# Patient Record
Sex: MALE | Race: WHITE | Employment: OTHER | URBAN - METROPOLITAN AREA
[De-identification: names, ages, dates, MRNs, and addresses within clinical notes are randomized per-mention and may not be internally consistent; named-entity substitution may affect disease eponyms.]

---

## 2020-10-30 ENCOUNTER — APPOINTMENT (OUTPATIENT)
Dept: CT IMAGING | Age: 70
DRG: 871 | End: 2020-10-30
Attending: EMERGENCY MEDICINE
Payer: MEDICARE

## 2020-10-30 ENCOUNTER — APPOINTMENT (OUTPATIENT)
Dept: GENERAL RADIOLOGY | Age: 70
DRG: 871 | End: 2020-10-30
Attending: EMERGENCY MEDICINE
Payer: MEDICARE

## 2020-10-30 ENCOUNTER — HOSPITAL ENCOUNTER (INPATIENT)
Age: 70
LOS: 9 days | Discharge: HOME | DRG: 871 | End: 2020-11-10
Attending: EMERGENCY MEDICINE | Admitting: HOSPITALIST
Payer: MEDICARE

## 2020-10-30 DIAGNOSIS — R50.9 FEVER, UNSPECIFIED FEVER CAUSE: ICD-10-CM

## 2020-10-30 DIAGNOSIS — G93.40 ACUTE ENCEPHALOPATHY: Primary | ICD-10-CM

## 2020-10-30 PROBLEM — R41.0 ACUTE DELIRIUM: Status: ACTIVE | Noted: 2020-10-30

## 2020-10-30 LAB
ALBUMIN SERPL-MCNC: 3.7 G/DL (ref 3.5–5)
ALBUMIN/GLOB SERPL: 0.9 {RATIO} (ref 1.1–2.2)
ALP SERPL-CCNC: 64 U/L (ref 45–117)
ALT SERPL-CCNC: 28 U/L (ref 12–78)
ANION GAP SERPL CALC-SCNC: 10 MMOL/L (ref 5–15)
APPEARANCE CSF: CLEAR
APPEARANCE CSF: CLEAR
ARTERIAL PATENCY WRIST A: YES
AST SERPL-CCNC: 29 U/L (ref 15–37)
BASE EXCESS BLD CALC-SCNC: 2 MMOL/L
BASOPHILS # BLD: 0 K/UL (ref 0–0.1)
BASOPHILS NFR BLD: 0 % (ref 0–1)
BDY SITE: ABNORMAL
BILIRUB SERPL-MCNC: 0.8 MG/DL (ref 0.2–1)
BUN SERPL-MCNC: 21 MG/DL (ref 6–20)
BUN/CREAT SERPL: 16 (ref 12–20)
CALCIUM SERPL-MCNC: 9.3 MG/DL (ref 8.5–10.1)
CHLORIDE SERPL-SCNC: 101 MMOL/L (ref 97–108)
CO2 SERPL-SCNC: 28 MMOL/L (ref 21–32)
COLOR CSF: COLORLESS
COLOR CSF: COLORLESS
CREAT SERPL-MCNC: 1.35 MG/DL (ref 0.7–1.3)
DIFFERENTIAL METHOD BLD: ABNORMAL
EOSINOPHIL # BLD: 0 K/UL (ref 0–0.4)
EOSINOPHIL NFR BLD: 0 % (ref 0–7)
ERYTHROCYTE [DISTWIDTH] IN BLOOD BY AUTOMATED COUNT: 13.5 % (ref 11.5–14.5)
GAS FLOW.O2 O2 DELIVERY SYS: ABNORMAL L/MIN
GLOBULIN SER CALC-MCNC: 4.3 G/DL (ref 2–4)
GLUCOSE BLD STRIP.AUTO-MCNC: 185 MG/DL (ref 65–100)
GLUCOSE CSF-MCNC: 110 MG/DL (ref 40–70)
GLUCOSE SERPL-MCNC: 172 MG/DL (ref 65–100)
HCO3 BLD-SCNC: 26.5 MMOL/L (ref 22–26)
HCT VFR BLD AUTO: 39.7 % (ref 36.6–50.3)
HGB BLD-MCNC: 12.7 G/DL (ref 12.1–17)
IMM GRANULOCYTES # BLD AUTO: 0.1 K/UL (ref 0–0.04)
IMM GRANULOCYTES NFR BLD AUTO: 1 % (ref 0–0.5)
LACTATE SERPL-SCNC: 2.6 MMOL/L (ref 0.4–2)
LACTATE SERPL-SCNC: 2.7 MMOL/L (ref 0.4–2)
LYMPHOCYTES # BLD: 0.8 K/UL (ref 0.8–3.5)
LYMPHOCYTES NFR BLD: 4 % (ref 12–49)
MCH RBC QN AUTO: 28.5 PG (ref 26–34)
MCHC RBC AUTO-ENTMCNC: 32 G/DL (ref 30–36.5)
MCV RBC AUTO: 89 FL (ref 80–99)
MONOCYTES # BLD: 1 K/UL (ref 0–1)
MONOCYTES NFR BLD: 5 % (ref 5–13)
NEUTS SEG # BLD: 17.6 K/UL (ref 1.8–8)
NEUTS SEG NFR BLD: 90 % (ref 32–75)
NRBC # BLD: 0 K/UL (ref 0–0.01)
NRBC BLD-RTO: 0 PER 100 WBC
PCO2 BLD: 41 MMHG (ref 35–45)
PH BLD: 7.42 [PH] (ref 7.35–7.45)
PLATELET # BLD AUTO: 215 K/UL (ref 150–400)
PMV BLD AUTO: 12 FL (ref 8.9–12.9)
PO2 BLD: 65 MMHG (ref 80–100)
POTASSIUM SERPL-SCNC: 3.8 MMOL/L (ref 3.5–5.1)
PROT CSF-MCNC: 82 MG/DL (ref 15–45)
PROT SERPL-MCNC: 8 G/DL (ref 6.4–8.2)
RBC # BLD AUTO: 4.46 M/UL (ref 4.1–5.7)
RBC # CSF: 0 /CU MM
RBC # CSF: 0 /CU MM
SAO2 % BLD: 93 % (ref 92–97)
SERVICE CMNT-IMP: ABNORMAL
SODIUM SERPL-SCNC: 139 MMOL/L (ref 136–145)
SPECIMEN TYPE: ABNORMAL
TOTAL RESP. RATE, ITRR: 13
TROPONIN I SERPL-MCNC: <0.05 NG/ML
TUBE # CSF: 1
TUBE # CSF: 4
WBC # BLD AUTO: 19.5 K/UL (ref 4.1–11.1)
WBC # CSF: 1 /CU MM (ref 0–5)
WBC # CSF: 1 /CU MM (ref 0–5)

## 2020-10-30 PROCEDURE — 96374 THER/PROPH/DIAG INJ IV PUSH: CPT

## 2020-10-30 PROCEDURE — 36600 WITHDRAWAL OF ARTERIAL BLOOD: CPT

## 2020-10-30 PROCEDURE — 96375 TX/PRO/DX INJ NEW DRUG ADDON: CPT

## 2020-10-30 PROCEDURE — 74011000258 HC RX REV CODE- 258: Performed by: EMERGENCY MEDICINE

## 2020-10-30 PROCEDURE — 83605 ASSAY OF LACTIC ACID: CPT

## 2020-10-30 PROCEDURE — 82962 GLUCOSE BLOOD TEST: CPT

## 2020-10-30 PROCEDURE — 82945 GLUCOSE OTHER FLUID: CPT

## 2020-10-30 PROCEDURE — 82803 BLOOD GASES ANY COMBINATION: CPT

## 2020-10-30 PROCEDURE — 36415 COLL VENOUS BLD VENIPUNCTURE: CPT

## 2020-10-30 PROCEDURE — 87040 BLOOD CULTURE FOR BACTERIA: CPT

## 2020-10-30 PROCEDURE — 99218 HC RM OBSERVATION: CPT

## 2020-10-30 PROCEDURE — 87015 SPECIMEN INFECT AGNT CONCNTJ: CPT

## 2020-10-30 PROCEDURE — 71045 X-RAY EXAM CHEST 1 VIEW: CPT

## 2020-10-30 PROCEDURE — 87635 SARS-COV-2 COVID-19 AMP PRB: CPT

## 2020-10-30 PROCEDURE — 93005 ELECTROCARDIOGRAM TRACING: CPT

## 2020-10-30 PROCEDURE — 99285 EMERGENCY DEPT VISIT HI MDM: CPT

## 2020-10-30 PROCEDURE — 89050 BODY FLUID CELL COUNT: CPT

## 2020-10-30 PROCEDURE — 87205 SMEAR GRAM STAIN: CPT

## 2020-10-30 PROCEDURE — 85025 COMPLETE CBC W/AUTO DIFF WBC: CPT

## 2020-10-30 PROCEDURE — 74011250636 HC RX REV CODE- 250/636: Performed by: EMERGENCY MEDICINE

## 2020-10-30 PROCEDURE — 84484 ASSAY OF TROPONIN QUANT: CPT

## 2020-10-30 PROCEDURE — 80053 COMPREHEN METABOLIC PANEL: CPT

## 2020-10-30 PROCEDURE — 74011250637 HC RX REV CODE- 250/637: Performed by: EMERGENCY MEDICINE

## 2020-10-30 PROCEDURE — 70450 CT HEAD/BRAIN W/O DYE: CPT

## 2020-10-30 PROCEDURE — 84157 ASSAY OF PROTEIN OTHER: CPT

## 2020-10-30 RX ORDER — ROSUVASTATIN CALCIUM 10 MG/1
10 TABLET, COATED ORAL
COMMUNITY

## 2020-10-30 RX ORDER — AMLODIPINE AND OLMESARTAN MEDOXOMIL 5; 40 MG/1; MG/1
1 TABLET ORAL DAILY
COMMUNITY

## 2020-10-30 RX ORDER — SITAGLIPTIN AND METFORMIN HYDROCHLORIDE 100; 1000 MG/1; MG/1
TABLET, FILM COATED, EXTENDED RELEASE ORAL
Status: ON HOLD | COMMUNITY
End: 2020-11-03

## 2020-10-30 RX ORDER — ACETAMINOPHEN 325 MG/1
650 TABLET ORAL
Status: COMPLETED | OUTPATIENT
Start: 2020-10-30 | End: 2020-10-30

## 2020-10-30 RX ORDER — LEVOFLOXACIN 5 MG/ML
750 INJECTION, SOLUTION INTRAVENOUS
Status: COMPLETED | OUTPATIENT
Start: 2020-10-30 | End: 2020-10-30

## 2020-10-30 RX ORDER — ACETAMINOPHEN 325 MG/1
650 TABLET ORAL
Status: DISCONTINUED | OUTPATIENT
Start: 2020-10-30 | End: 2020-10-31

## 2020-10-30 RX ORDER — CHLORTHALIDONE 25 MG/1
25 TABLET ORAL DAILY
COMMUNITY

## 2020-10-30 RX ORDER — SODIUM CHLORIDE, SODIUM LACTATE, POTASSIUM CHLORIDE, CALCIUM CHLORIDE 600; 310; 30; 20 MG/100ML; MG/100ML; MG/100ML; MG/100ML
50 INJECTION, SOLUTION INTRAVENOUS CONTINUOUS
Status: DISCONTINUED | OUTPATIENT
Start: 2020-10-30 | End: 2020-11-09

## 2020-10-30 RX ADMIN — LEVOFLOXACIN 750 MG: 5 INJECTION, SOLUTION INTRAVENOUS at 20:09

## 2020-10-30 RX ADMIN — SODIUM CHLORIDE 1000 ML: 900 INJECTION, SOLUTION INTRAVENOUS at 19:45

## 2020-10-30 RX ADMIN — ACETAMINOPHEN 650 MG: 325 TABLET ORAL at 19:18

## 2020-10-30 RX ADMIN — CEFEPIME HYDROCHLORIDE 2 G: 2 INJECTION, POWDER, FOR SOLUTION INTRAVENOUS at 19:18

## 2020-10-30 NOTE — ED PROVIDER NOTES
Mr. Elina Ribera is a 69yo male who presents to the ER after dental procedure with altered mental status. He had a number of teeth removed earlier today. Per the dentist, his procedure was finished at 10:00 this morning. He had been in the office after his sedation and being fitted for dentures. The dentist states that is normal in all day visit. However, he is remained altered since the procedure. He was hypoxic with oxygen saturations of 88%. He has continued to be drowsy and difficult to arouse. His sats had improved on submental oxygen and on room air prior to him being sent to the ER. The patient's son states that he has been feeling well prior to the surgery. He has not had any fevers or chills. Has not had any coughing. He has not had any chest pain or trouble breathing. The history from the patient is limited. Most of the history was obtained by the dentist and also the son. Past Medical History:   Diagnosis Date    Diabetes ([facility] [address]        No past surgical history on file. No family history on file.     Social History     Socioeconomic History    Marital status: Not on file     Spouse name: Not on file    Number of children: Not on file    Years of education: Not on file    Highest education level: Not on file   Occupational History    Not on file   Social Needs    Financial resource strain: Not on file    Food insecurity     Worry: Not on file     Inability: Not on file    Transportation needs     Medical: Not on file     Non-medical: Not on file   Tobacco Use    Smoking status: Never Smoker    Smokeless tobacco: Never Used   Substance and Sexual Activity    Alcohol use: Yes     Comment: social    Drug use: Never    Sexual activity: Not on file   Lifestyle    Physical activity     Days per week: Not on file     Minutes per session: Not on file    Stress: Not on file   Relationships    Social connections     Talks on phone: Not on file     Gets together: Not on file Attends Latter day service: Not on file     Active member of club or organization: Not on file     Attends meetings of clubs or organizations: Not on file     Relationship status: Not on file    Intimate partner violence     Fear of current or ex partner: Not on file     Emotionally abused: Not on file     Physically abused: Not on file     Forced sexual activity: Not on file   Other Topics Concern    Not on file   Social History Narrative    Not on file         ALLERGIES: Patient has no known allergies. Review of Systems   Unable to perform ROS: Mental status change   All other systems reviewed and are negative. There were no vitals filed for this visit. Physical Exam     Vital signs reviewed. Nursing notes reviewed. Const: Appears drowsy, requires to be awoken regularly with verbal stimuli or by touch.   Is only able to answer 1 question before he goes back to sleep  HEENT: Multiple teeth that have been extracted with a mild amount of blood in his mouth  Head:  Atraumatic, normocephalic  Eyes:  PERRL, conjunctiva normal, no scleral icterus  Neck:  Supple, trachea midline  Cardiovascular:  RRR, no murmurs, no gallops, no rubs  Resp:  No resp distress, no increased work of breathing  Abd:  Soft, non-tender, non-distended, no rebound  MSK:  No pedal edema, normal ROM  Neuro: Drowsy and oriented x1 (he states that it [id number] and he is in [place]), no cranial nerve defect  Skin:  Warm, dry, intact  Psych: Drowsy, not agitated        MDM  Number of Diagnoses or Management Options     Amount and/or Complexity of Data Reviewed  Clinical lab tests: ordered and reviewed  Tests in the radiology section of CPT®: ordered and reviewed  Review and summarize past medical records: yes    Patient Progress  Patient progress: stable         Procedures          Perfect Serve Consult for Admission  7:15 PM    ED Room Number: SER06/06  Patient Name and age:  Dallas Anguiano 78 y.o.  male  Working Diagnosis: 1. Acute encephalopathy    2. Fever, unspecified fever cause        COVID-19 Suspicion:  yes  Sepsis present:  no  Reassessment needed: no  Code Status:  Unable to ask due to his mental status  Readmission: no  Isolation Requirements:  yes  Recommended Level of Care:  med/surg  Department:[place] [facility] - [phone]  Other:  LP results pending. abx given. Mr. Morgan Talamantes is a 69yo male who presents to the ER with complaints of continued AMS after dental procedure today. Pt. Is still not back to his baseline. He was found to have a fever in the ER. NO mass or bleed on head CT. Abx given. Pt.  To be admitted by the hospitalist.

## 2020-10-30 NOTE — ED TRIAGE NOTES
Pt comes from the Dentist, pt has been there since 10am and the dentist states that he is still really drowsy and had teeth removed and is oozing blood. Pt states that he was feeling fine before procedure and is currently in no pain.

## 2020-10-31 ENCOUNTER — APPOINTMENT (OUTPATIENT)
Dept: CT IMAGING | Age: 70
DRG: 871 | End: 2020-10-31
Attending: INTERNAL MEDICINE
Payer: MEDICARE

## 2020-10-31 LAB
ALBUMIN SERPL-MCNC: 3 G/DL (ref 3.5–5)
ALBUMIN/GLOB SERPL: 0.8 {RATIO} (ref 1.1–2.2)
ALP SERPL-CCNC: 55 U/L (ref 45–117)
ALT SERPL-CCNC: 19 U/L (ref 12–78)
AMMONIA PLAS-SCNC: 45 UMOL/L
AMPHET UR QL SCN: NEGATIVE
ANION GAP SERPL CALC-SCNC: 6 MMOL/L (ref 5–15)
ANION GAP SERPL CALC-SCNC: 8 MMOL/L (ref 5–15)
APPEARANCE UR: CLEAR
AST SERPL-CCNC: 12 U/L (ref 15–37)
BARBITURATES UR QL SCN: NEGATIVE
BASOPHILS # BLD: 0.1 K/UL (ref 0–0.1)
BASOPHILS NFR BLD: 0 % (ref 0–1)
BENZODIAZ UR QL: POSITIVE
BILIRUB SERPL-MCNC: 0.7 MG/DL (ref 0.2–1)
BILIRUB UR QL: NEGATIVE
BNP SERPL-MCNC: 265 PG/ML
BUN SERPL-MCNC: 13 MG/DL (ref 6–20)
BUN SERPL-MCNC: 21 MG/DL (ref 6–20)
BUN/CREAT SERPL: 12 (ref 12–20)
BUN/CREAT SERPL: 18 (ref 12–20)
CALCIUM SERPL-MCNC: 8.1 MG/DL (ref 8.5–10.1)
CALCIUM SERPL-MCNC: 8.6 MG/DL (ref 8.5–10.1)
CANNABINOIDS UR QL SCN: NEGATIVE
CHLORIDE SERPL-SCNC: 103 MMOL/L (ref 97–108)
CHLORIDE SERPL-SCNC: 106 MMOL/L (ref 97–108)
CO2 SERPL-SCNC: 26 MMOL/L (ref 21–32)
CO2 SERPL-SCNC: 29 MMOL/L (ref 21–32)
COCAINE UR QL SCN: NEGATIVE
COLOR UR: NORMAL
COMMENT, HOLDF: NORMAL
CREAT SERPL-MCNC: 1.08 MG/DL (ref 0.7–1.3)
CREAT SERPL-MCNC: 1.2 MG/DL (ref 0.7–1.3)
DIFFERENTIAL METHOD BLD: ABNORMAL
DRUG SCRN COMMENT,DRGCM: ABNORMAL
EOSINOPHIL # BLD: 0 K/UL (ref 0–0.4)
EOSINOPHIL NFR BLD: 0 % (ref 0–7)
ERYTHROCYTE [DISTWIDTH] IN BLOOD BY AUTOMATED COUNT: 13.7 % (ref 11.5–14.5)
EST. AVERAGE GLUCOSE BLD GHB EST-MCNC: 169 MG/DL
GLOBULIN SER CALC-MCNC: 3.6 G/DL (ref 2–4)
GLUCOSE BLD STRIP.AUTO-MCNC: 141 MG/DL (ref 65–100)
GLUCOSE BLD STRIP.AUTO-MCNC: 173 MG/DL (ref 65–100)
GLUCOSE BLD STRIP.AUTO-MCNC: 173 MG/DL (ref 65–100)
GLUCOSE SERPL-MCNC: 139 MG/DL (ref 65–100)
GLUCOSE SERPL-MCNC: 170 MG/DL (ref 65–100)
GLUCOSE UR STRIP.AUTO-MCNC: NEGATIVE MG/DL
HBA1C MFR BLD: 7.5 % (ref 4–5.6)
HCT VFR BLD AUTO: 34.3 % (ref 36.6–50.3)
HEALTH STATUS, XMCV2T: NORMAL
HGB BLD-MCNC: 11 G/DL (ref 12.1–17)
HGB UR QL STRIP: NEGATIVE
IMM GRANULOCYTES # BLD AUTO: 0.1 K/UL (ref 0–0.04)
IMM GRANULOCYTES NFR BLD AUTO: 1 % (ref 0–0.5)
KETONES UR QL STRIP.AUTO: NEGATIVE MG/DL
LACTATE SERPL-SCNC: 1.5 MMOL/L (ref 0.4–2)
LACTATE SERPL-SCNC: 2.5 MMOL/L (ref 0.4–2)
LEUKOCYTE ESTERASE UR QL STRIP.AUTO: NEGATIVE
LIPASE SERPL-CCNC: 35 U/L (ref 73–393)
LYMPHOCYTES # BLD: 2.3 K/UL (ref 0.8–3.5)
LYMPHOCYTES NFR BLD: 11 % (ref 12–49)
MAGNESIUM SERPL-MCNC: 1.7 MG/DL (ref 1.6–2.4)
MCH RBC QN AUTO: 28.9 PG (ref 26–34)
MCHC RBC AUTO-ENTMCNC: 32.1 G/DL (ref 30–36.5)
MCV RBC AUTO: 90 FL (ref 80–99)
METHADONE UR QL: NEGATIVE
MONOCYTES # BLD: 1 K/UL (ref 0–1)
MONOCYTES NFR BLD: 5 % (ref 5–13)
NEUTS SEG # BLD: 17.2 K/UL (ref 1.8–8)
NEUTS SEG NFR BLD: 83 % (ref 32–75)
NITRITE UR QL STRIP.AUTO: NEGATIVE
NRBC # BLD: 0 K/UL (ref 0–0.01)
NRBC BLD-RTO: 0 PER 100 WBC
OPIATES UR QL: POSITIVE
PCP UR QL: NEGATIVE
PH UR STRIP: 5 [PH] (ref 5–8)
PHOSPHATE SERPL-MCNC: 3.6 MG/DL (ref 2.6–4.7)
PLATELET # BLD AUTO: 197 K/UL (ref 150–400)
PMV BLD AUTO: 12.2 FL (ref 8.9–12.9)
POTASSIUM SERPL-SCNC: 2.7 MMOL/L (ref 3.5–5.1)
POTASSIUM SERPL-SCNC: 3.1 MMOL/L (ref 3.5–5.1)
PROT SERPL-MCNC: 6.6 G/DL (ref 6.4–8.2)
PROT UR STRIP-MCNC: NEGATIVE MG/DL
RBC # BLD AUTO: 3.81 M/UL (ref 4.1–5.7)
SAMPLES BEING HELD,HOLD: NORMAL
SARS-COV-2, COV2: NOT DETECTED
SERVICE CMNT-IMP: ABNORMAL
SODIUM SERPL-SCNC: 138 MMOL/L (ref 136–145)
SODIUM SERPL-SCNC: 140 MMOL/L (ref 136–145)
SOURCE, COVRS: NORMAL
SP GR UR REFRACTOMETRY: 1.03 (ref 1–1.03)
SPECIMEN SOURCE, FCOV2M: NORMAL
SPECIMEN TYPE, XMCV1T: NORMAL
TROPONIN I SERPL-MCNC: <0.05 NG/ML
TSH SERPL DL<=0.05 MIU/L-ACNC: 0.51 UIU/ML (ref 0.36–3.74)
UROBILINOGEN UR QL STRIP.AUTO: 0.2 EU/DL (ref 0.2–1)
WBC # BLD AUTO: 20.7 K/UL (ref 4.1–11.1)

## 2020-10-31 PROCEDURE — 84484 ASSAY OF TROPONIN QUANT: CPT

## 2020-10-31 PROCEDURE — 74011250637 HC RX REV CODE- 250/637: Performed by: INTERNAL MEDICINE

## 2020-10-31 PROCEDURE — 83036 HEMOGLOBIN GLYCOSYLATED A1C: CPT

## 2020-10-31 PROCEDURE — 96376 TX/PRO/DX INJ SAME DRUG ADON: CPT

## 2020-10-31 PROCEDURE — 74011250636 HC RX REV CODE- 250/636: Performed by: HOSPITALIST

## 2020-10-31 PROCEDURE — 74011636637 HC RX REV CODE- 636/637: Performed by: INTERNAL MEDICINE

## 2020-10-31 PROCEDURE — 77030040831 HC BAG URINE DRNG MDII -A

## 2020-10-31 PROCEDURE — 85025 COMPLETE CBC W/AUTO DIFF WBC: CPT

## 2020-10-31 PROCEDURE — 83735 ASSAY OF MAGNESIUM: CPT

## 2020-10-31 PROCEDURE — 84100 ASSAY OF PHOSPHORUS: CPT

## 2020-10-31 PROCEDURE — 74011250636 HC RX REV CODE- 250/636: Performed by: INTERNAL MEDICINE

## 2020-10-31 PROCEDURE — 82140 ASSAY OF AMMONIA: CPT

## 2020-10-31 PROCEDURE — 81003 URINALYSIS AUTO W/O SCOPE: CPT

## 2020-10-31 PROCEDURE — 82962 GLUCOSE BLOOD TEST: CPT

## 2020-10-31 PROCEDURE — 74011250637 HC RX REV CODE- 250/637: Performed by: HOSPITALIST

## 2020-10-31 PROCEDURE — 80307 DRUG TEST PRSMV CHEM ANLYZR: CPT

## 2020-10-31 PROCEDURE — 36415 COLL VENOUS BLD VENIPUNCTURE: CPT

## 2020-10-31 PROCEDURE — 96375 TX/PRO/DX INJ NEW DRUG ADDON: CPT

## 2020-10-31 PROCEDURE — 83605 ASSAY OF LACTIC ACID: CPT

## 2020-10-31 PROCEDURE — 80053 COMPREHEN METABOLIC PANEL: CPT

## 2020-10-31 PROCEDURE — 83690 ASSAY OF LIPASE: CPT

## 2020-10-31 PROCEDURE — 99218 HC RM OBSERVATION: CPT

## 2020-10-31 PROCEDURE — 74011000258 HC RX REV CODE- 258: Performed by: INTERNAL MEDICINE

## 2020-10-31 PROCEDURE — 83880 ASSAY OF NATRIURETIC PEPTIDE: CPT

## 2020-10-31 PROCEDURE — 84443 ASSAY THYROID STIM HORMONE: CPT

## 2020-10-31 RX ORDER — SODIUM CHLORIDE 0.9 % (FLUSH) 0.9 %
5-10 SYRINGE (ML) INJECTION AS NEEDED
Status: DISCONTINUED | OUTPATIENT
Start: 2020-10-31 | End: 2020-11-10 | Stop reason: HOSPADM

## 2020-10-31 RX ORDER — DEXTROSE 50 % IN WATER (D50W) INTRAVENOUS SYRINGE
12.5-25 AS NEEDED
Status: DISCONTINUED | OUTPATIENT
Start: 2020-10-31 | End: 2020-10-31 | Stop reason: CLARIF

## 2020-10-31 RX ORDER — ONDANSETRON 2 MG/ML
4 INJECTION INTRAMUSCULAR; INTRAVENOUS
Status: DISCONTINUED | OUTPATIENT
Start: 2020-10-31 | End: 2020-11-10 | Stop reason: HOSPADM

## 2020-10-31 RX ORDER — VANCOMYCIN 2 GRAM/500 ML IN 0.9 % SODIUM CHLORIDE INTRAVENOUS
2000 ONCE
Status: COMPLETED | OUTPATIENT
Start: 2020-10-31 | End: 2020-10-31

## 2020-10-31 RX ORDER — MAGNESIUM SULFATE 100 %
4 CRYSTALS MISCELLANEOUS AS NEEDED
Status: DISCONTINUED | OUTPATIENT
Start: 2020-10-31 | End: 2020-11-10 | Stop reason: HOSPADM

## 2020-10-31 RX ORDER — MAGNESIUM SULFATE 1 G/100ML
1 INJECTION INTRAVENOUS ONCE
Status: COMPLETED | OUTPATIENT
Start: 2020-10-31 | End: 2020-10-31

## 2020-10-31 RX ORDER — SODIUM CHLORIDE 0.9 % (FLUSH) 0.9 %
5-40 SYRINGE (ML) INJECTION AS NEEDED
Status: DISCONTINUED | OUTPATIENT
Start: 2020-10-31 | End: 2020-11-10 | Stop reason: HOSPADM

## 2020-10-31 RX ORDER — POTASSIUM CHLORIDE 14.9 MG/ML
10 INJECTION INTRAVENOUS
Status: COMPLETED | OUTPATIENT
Start: 2020-10-31 | End: 2020-10-31

## 2020-10-31 RX ORDER — POLYETHYLENE GLYCOL 3350 17 G/17G
17 POWDER, FOR SOLUTION ORAL DAILY PRN
Status: DISCONTINUED | OUTPATIENT
Start: 2020-10-31 | End: 2020-11-10 | Stop reason: HOSPADM

## 2020-10-31 RX ORDER — POTASSIUM CHLORIDE 750 MG/1
40 TABLET, FILM COATED, EXTENDED RELEASE ORAL 2 TIMES DAILY
Status: COMPLETED | OUTPATIENT
Start: 2020-10-31 | End: 2020-11-02

## 2020-10-31 RX ORDER — PROMETHAZINE HYDROCHLORIDE 25 MG/1
12.5 TABLET ORAL
Status: DISCONTINUED | OUTPATIENT
Start: 2020-10-31 | End: 2020-11-10 | Stop reason: HOSPADM

## 2020-10-31 RX ORDER — ROSUVASTATIN CALCIUM 10 MG/1
10 TABLET, COATED ORAL
Status: DISCONTINUED | OUTPATIENT
Start: 2020-10-31 | End: 2020-11-10 | Stop reason: HOSPADM

## 2020-10-31 RX ORDER — VANCOMYCIN/0.9 % SOD CHLORIDE 1.5G/250ML
1500 PLASTIC BAG, INJECTION (ML) INTRAVENOUS
Status: DISCONTINUED | OUTPATIENT
Start: 2020-10-31 | End: 2020-11-02

## 2020-10-31 RX ORDER — SODIUM CHLORIDE 0.9 % (FLUSH) 0.9 %
5-40 SYRINGE (ML) INJECTION EVERY 8 HOURS
Status: DISCONTINUED | OUTPATIENT
Start: 2020-10-31 | End: 2020-11-10 | Stop reason: HOSPADM

## 2020-10-31 RX ORDER — INSULIN LISPRO 100 [IU]/ML
INJECTION, SOLUTION INTRAVENOUS; SUBCUTANEOUS
Status: DISCONTINUED | OUTPATIENT
Start: 2020-10-31 | End: 2020-11-10 | Stop reason: HOSPADM

## 2020-10-31 RX ORDER — ACETAMINOPHEN 650 MG/1
650 SUPPOSITORY RECTAL
Status: DISCONTINUED | OUTPATIENT
Start: 2020-10-31 | End: 2020-11-10 | Stop reason: HOSPADM

## 2020-10-31 RX ORDER — ACETAMINOPHEN 325 MG/1
650 TABLET ORAL
Status: DISCONTINUED | OUTPATIENT
Start: 2020-10-31 | End: 2020-11-10 | Stop reason: HOSPADM

## 2020-10-31 RX ADMIN — MAGNESIUM SULFATE HEPTAHYDRATE 1 G: 1 INJECTION, SOLUTION INTRAVENOUS at 09:11

## 2020-10-31 RX ADMIN — SODIUM CHLORIDE 1000 ML: 900 INJECTION, SOLUTION INTRAVENOUS at 06:50

## 2020-10-31 RX ADMIN — POTASSIUM CHLORIDE 10 MEQ: 14.9 INJECTION, SOLUTION INTRAVENOUS at 06:42

## 2020-10-31 RX ADMIN — SODIUM CHLORIDE, SODIUM LACTATE, POTASSIUM CHLORIDE, AND CALCIUM CHLORIDE 100 ML/HR: 600; 310; 30; 20 INJECTION, SOLUTION INTRAVENOUS at 09:12

## 2020-10-31 RX ADMIN — POTASSIUM CHLORIDE 10 MEQ: 14.9 INJECTION, SOLUTION INTRAVENOUS at 10:29

## 2020-10-31 RX ADMIN — POTASSIUM CHLORIDE 10 MEQ: 14.9 INJECTION, SOLUTION INTRAVENOUS at 09:12

## 2020-10-31 RX ADMIN — VANCOMYCIN HYDROCHLORIDE 1500 MG: 100 INJECTION, POWDER, LYOPHILIZED, FOR SOLUTION INTRAVENOUS at 20:44

## 2020-10-31 RX ADMIN — Medication 10 ML: at 21:03

## 2020-10-31 RX ADMIN — SODIUM CHLORIDE 1000 ML: 900 INJECTION, SOLUTION INTRAVENOUS at 04:38

## 2020-10-31 RX ADMIN — ROSUVASTATIN CALCIUM 10 MG: 10 TABLET, FILM COATED ORAL at 06:04

## 2020-10-31 RX ADMIN — SODIUM CHLORIDE 1000 ML: 900 INJECTION, SOLUTION INTRAVENOUS at 09:12

## 2020-10-31 RX ADMIN — SODIUM CHLORIDE, SODIUM LACTATE, POTASSIUM CHLORIDE, AND CALCIUM CHLORIDE 100 ML/HR: 600; 310; 30; 20 INJECTION, SOLUTION INTRAVENOUS at 00:32

## 2020-10-31 RX ADMIN — PIPERACILLIN AND TAZOBACTAM 3.38 G: 3; .375 INJECTION, POWDER, LYOPHILIZED, FOR SOLUTION INTRAVENOUS at 20:44

## 2020-10-31 RX ADMIN — Medication 10 ML: at 16:02

## 2020-10-31 RX ADMIN — Medication 10 ML: at 06:43

## 2020-10-31 RX ADMIN — PIPERACILLIN AND TAZOBACTAM 3.38 G: 3; .375 INJECTION, POWDER, LYOPHILIZED, FOR SOLUTION INTRAVENOUS at 12:45

## 2020-10-31 RX ADMIN — POTASSIUM CHLORIDE 40 MEQ: 750 TABLET, FILM COATED, EXTENDED RELEASE ORAL at 17:26

## 2020-10-31 RX ADMIN — VANCOMYCIN HYDROCHLORIDE 2000 MG: 100 INJECTION, POWDER, LYOPHILIZED, FOR SOLUTION INTRAVENOUS at 04:39

## 2020-10-31 RX ADMIN — ROSUVASTATIN CALCIUM 10 MG: 10 TABLET, FILM COATED ORAL at 22:54

## 2020-10-31 RX ADMIN — SODIUM CHLORIDE, SODIUM LACTATE, POTASSIUM CHLORIDE, AND CALCIUM CHLORIDE 100 ML/HR: 600; 310; 30; 20 INJECTION, SOLUTION INTRAVENOUS at 16:58

## 2020-10-31 RX ADMIN — INSULIN LISPRO 2 UNITS: 100 INJECTION, SOLUTION INTRAVENOUS; SUBCUTANEOUS at 09:12

## 2020-10-31 RX ADMIN — PIPERACILLIN AND TAZOBACTAM 3.38 G: 3; .375 INJECTION, POWDER, LYOPHILIZED, FOR SOLUTION INTRAVENOUS at 04:50

## 2020-10-31 RX ADMIN — INSULIN LISPRO 2 UNITS: 100 INJECTION, SOLUTION INTRAVENOUS; SUBCUTANEOUS at 11:46

## 2020-10-31 NOTE — ED NOTES
TRANSFER - OUT REPORT:    Verbal report given to Jered Leos RN on Sanju Chaudhari  being transferred to [place] [id number] ([facility]) for routine progression of care       Report consisted of patients Situation, Background, Assessment and   Recommendations(SBAR). Information from the following report(s) ED Summary was reviewed with the receiving nurse. Lines:   Peripheral IV 10/30/20 Left Antecubital (Active)   Site Assessment Clean, dry, & intact 10/30/20 1747   Phlebitis Assessment 0 10/30/20 1747   Infiltration Assessment 0 10/30/20 1747   Dressing Status Clean, dry, & intact 10/30/20 1747   Dressing Type Transparent 10/30/20 1747   Hub Color/Line Status Pink 10/30/20 1747   Action Taken Blood drawn 10/30/20 1747       Peripheral IV 10/30/20 Left Hand (Active)   Site Assessment Clean, dry, & intact 10/30/20 1919   Phlebitis Assessment 0 10/30/20 1919   Infiltration Assessment 0 10/30/20 1919   Dressing Status Clean, dry, & intact 10/30/20 1919   Dressing Type Transparent 10/30/20 1919   Hub Color/Line Status Pink 10/30/20 1919   Action Taken Blood drawn 10/30/20 1919        Opportunity for questions and clarification was provided.       Patient transported with:  [address] [place] [address]

## 2020-10-31 NOTE — PROGRESS NOTES
Pharmacist Note - Vancomycin Dosing    Consult provided for this 78 y.o. male for indication of sepsis  Antibiotic regimen(s): zosyn  Patient on vancomycin PTA? NO     Recent Labs     10/30/20  1725   WBC 19.5*   CREA 1.35*   BUN 21*     Frequency of BMP: daily x 3 then every other day  Height: 188 cm  Weight: 118 kg  Est CrCl: 58 ml/min  Temp (24hrs), Av.5 °F (38.1 °C), Min:99.5 °F (37.5 °C), Max:101.9 °F (38.8 °C)    Cultures:pending    Goal trough = 15 - 20 mcg/mL    Therapy will be initiated with a loading dose of 2gm  IV x 1 to be followed by a maintenance dose of 1500 mg IV every 16 hours. Pharmacy to follow patient daily and order levels / make dose adjustments as appropriate.

## 2020-10-31 NOTE — PROGRESS NOTES
[facility]                                                                                          Hospitalist Progress Note  Yesenia Medina MD  Answering service: [phone] from in house phone        Date of Service:  10/31/2020  NAME:  Meghan Delaney  :  1950  MRN:  [id number]      Admission Summary: This is a 71-year-old man with a past medical history significant for type 2 diabetes, hypertension, dyslipidemia, was in his usual state of health until the day of his presentation at the emergency room when the patient developed a change in mental status. The patient reported to his dentist's office and underwent a number of tooth extractions. The procedure finishing at about 10:00 a.m. The patient developed a change in mental status after the procedure. He was monitored at the dentist's office without any improvement in his mental status. The patient was at baseline before the procedure according to report. The patient was also found to be hypoxic with oxygen saturation of 88%. The oxygen saturation improved with supplemental oxygen. Because of the persistent change in mental status, the patient was sent to the emergency room at the [facility] in [place]. The patient has no history of congestive heart failure or respiratory disease. There was no sick contact or contact with any person with COVID-19 virus infection. When the patient arrived at the emergency room, the patient was found to have a fever of 101.9, significant leukocytosis, and elevated lactic acid level. Lumbar puncture was performed. The patient received antibiotics and was referred to the hospitalist service for evaluation for admission. CT scan of the head done on arrival at the emergency room did not show any acute intracranial abnormalities.   No record of prior admission to this hospital.       Interval history / Subjective:    pt seen and examined AMS resolved   He is asking if he can go home  Pt was fluid resuscitated in ED     Assessment & Plan:     1. Acute delirium. metabolic encephalopathy   - opiates related ? From dental procedure drug screen positive   -   We will obtain MRI of the brain to evaluate the patient for stroke. CT head negative     2. Acute respiratory failure with hypoxia. resolved   - pt seen breathing normally in RA while examined  - CT chest ordered     3. Type 2 diabetes with hyperglycemia. Cont SSI Recheck hemoglobin A1c level. We will hold oral agents until the time of discharge from the hospital.    4.  Sepsis. The diagnosis of sepsis is supported by elevated lactic acid level, fever, and significant leukocytosis. - Patient on vancomycin and Zosyn. LP shows normal CSF  - CT scan of the maxillofacial bone to evaluate the patient for abscess as a possible source of infection. - CT scan of the chest to evaluate the patient for pneumonia as a possible source of sepsis. - CT scan of the abdomen and pelvis to evaluate the patient for possible source of sepsis. 5.  Hypertension. We will hold antihypertensive medication in the setting of sepsis with potential for hypotension. 6.  Dyslipidemia. We will resume preadmission medication. 7. Hypokalemia - replete and repeat lab     Code status: Full  DVT prophylaxis: scd    Care Plan discussed with: Patient/Family and Nurse  Anticipated Disposition: Home w/Family  Anticipated Discharge: 24 hours to 48 hours     Hospital Problems  Date Reviewed: 10/31/2020          Codes Class Noted POA    * (Principal) Acute delirium ICD-10-CM: R41.0  ICD-9-CM: 780.09  10/30/2020 Yes                Review of Systems:   A comprehensive review of systems was negative. Vital Signs:    Last 24hrs VS reviewed since prior progress note.  Most recent are:  Visit Vitals  /71 (BP 1 Location: Left arm, BP Patient Position: At rest)   Pulse 82   Temp 99.3 °F (37.4 °C)   Resp 17 Ht 6' 2\" (1.88 m)   Wt 117.9 kg (259 lb 14.8 oz)   SpO2 99%   BMI 33.37 kg/m²         Intake/Output Summary (Last 24 hours) at 10/31/2020 1033  Last data filed at 10/31/2020 0320  Gross per 24 hour   Intake 280 ml   Output 200 ml   Net 80 ml        Physical Examination:             Constitutional:  No acute distress, cooperative, pleasant    ENT:  Oral mucosa moist, oropharynx benign. Resp:  CTA bilaterally. No wheezing/rhonchi/rales. No accessory muscle use   CV:  Regular rhythm, normal rate, no murmurs, gallops, rubs    GI:  Soft, non distended, non tender. normoactive bowel sounds, no hepatosplenomegaly     Musculoskeletal:  No edema, warm, 2+ pulses throughout    Neurologic:  Moves all extremities. AAOx3, CN II-XII reviewed     Psych:  Good insight, Not anxious nor agitated. Data Review:    I personally reviewed  Image and labs      Labs:     Recent Labs     10/31/20  0316 10/30/20  1725   WBC 20.7* 19.5*   HGB 11.0* 12.7   HCT 34.3* 39.7    215     Recent Labs     10/31/20  0316 10/30/20  1725    139   K 2.7* 3.8    101   CO2 29 28   BUN 21* 21*   CREA 1.20 1.35*   * 172*   CA 8.6 9.3   MG 1.7  --    PHOS 3.6  --      Recent Labs     10/31/20  0316 10/30/20  1725   ALT 19 28   AP 55 64   TBILI 0.7 0.8   TP 6.6 8.0   ALB 3.0* 3.7   GLOB 3.6 4.3*   LPSE 35*  --      No results for input(s): INR, PTP, APTT, INREXT in the last 72 hours. No results for input(s): FE, TIBC, PSAT, FERR in the last 72 hours. No results found for: FOL, RBCF   No results for input(s): PH, PCO2, PO2 in the last 72 hours.   Recent Labs     10/31/20  0316 10/30/20  1725   TROIQ <0.05 <0.05     No results found for: CHOL, CHOLX, CHLST, CHOLV, HDL, HDLP, LDL, LDLC, DLDLP, TGLX, TRIGL, TRIGP, CHHD, CHHDX  Lab Results   Component Value Date/Time    Glucose (POC) 141 (H) 10/31/2020 09:04 AM    Glucose (POC) 185 (H) 10/30/2020 05:10 PM     Lab Results   Component Value Date/Time    Color YELLOW/STRAW 10/31/2020 03:16 AM    Appearance CLEAR 10/31/2020 03:16 AM    Specific gravity 1.030 10/31/2020 03:16 AM    pH (UA) 5.0 10/31/2020 03:16 AM    Protein Negative 10/31/2020 03:16 AM    Glucose Negative 10/31/2020 03:16 AM    Ketone Negative 10/31/2020 03:16 AM    Bilirubin Negative 10/31/2020 03:16 AM    Urobilinogen 0.2 10/31/2020 03:16 AM    Nitrites Negative 10/31/2020 03:16 AM    Leukocyte Esterase Negative 10/31/2020 03:16 AM         Medications Reviewed:     Current Facility-Administered Medications   Medication Dose Route Frequency    sodium chloride (NS) flush 5-10 mL  5-10 mL IntraVENous PRN    sodium chloride (NS) flush 5-40 mL  5-40 mL IntraVENous Q8H    sodium chloride (NS) flush 5-40 mL  5-40 mL IntraVENous PRN    acetaminophen (TYLENOL) tablet 650 mg  650 mg Oral Q6H PRN    Or    acetaminophen (TYLENOL) suppository 650 mg  650 mg Rectal Q6H PRN    polyethylene glycol (MIRALAX) packet 17 g  17 g Oral DAILY PRN    promethazine (PHENERGAN) tablet 12.5 mg  12.5 mg Oral Q6H PRN    Or    ondansetron (ZOFRAN) injection 4 mg  4 mg IntraVENous Q6H PRN    piperacillin-tazobactam (ZOSYN) 3.375 g in 0.9% sodium chloride (MBP/ADV) 100 mL  3.375 g IntraVENous Q8H    insulin lispro (HUMALOG) injection   SubCUTAneous AC&HS    glucose chewable tablet 16 g  4 Tab Oral PRN    glucagon (GLUCAGEN) injection 1 mg  1 mg IntraMUSCular PRN    sodium chloride 0.9 % bolus infusion 537 mL  537 mL IntraVENous ONCE    rosuvastatin (CRESTOR) tablet 10 mg  10 mg Oral QHS    vancomycin dosing per pharmacy   Other Rx Dosing/Monitoring    vancomycin (VANCOCIN) 1500 mg in  ml infusion  1,500 mg IntraVENous Q16H    dextrose 10 % infusion 125-250 mL  125-250 mL IntraVENous PRN    potassium chloride 10 mEq in 50 ml IVPB  10 mEq IntraVENous Q1H    lactated Ringers infusion  100 mL/hr IntraVENous CONTINUOUS     ______________________________________________________________________  EXPECTED LENGTH OF STAY: - - -  ACTUAL LENGTH OF STAY:          Asha Flores MD

## 2020-10-31 NOTE — H&P
[address]  HISTORY AND PHYSICAL    Name:  John Daigle  MR#:  [id number]  :  1950  ACCOUNT #:  [de-identified]  ADMIT DATE:  10/30/2020      Admission order was placed when the patient was still at [facility] at [facility], but the patient did not arrive at [facility] to be physically seen and admitted until 10/31/2020. The patient was seen, evaluated and admitted by me on 10/31/2020. PRIMARY CARE PHYSICIAN:  Unknown. SOURCE OF INFORMATION:  The patient who is not a good historian and review of ED records. CHIEF COMPLAINT:  Change in mental status. HISTORY OF PRESENT ILLNESS:  This is a 73-year-old man with a past medical history significant for type 2 diabetes, hypertension, dyslipidemia, was in his usual state of health until the day of his presentation at the emergency room when the patient developed a change in mental status. The patient reported to his dentist's office and underwent a number of tooth extractions. The procedure finishing at about 10:00 a.m. The patient developed a change in mental status after the procedure. He was monitored at the dentist's office without any improvement in his mental status. The patient was at baseline before the procedure according to report. The patient was also found to be hypoxic with oxygen saturation of 88%. The oxygen saturation improved with supplemental oxygen. Because of the persistent change in mental status, the patient was sent to the emergency room at the [facility] Emergency Room in [place]. The patient has no history of congestive heart failure or respiratory disease. There was no sick contact or contact with any person with COVID-19 virus infection. When the patient arrived at the emergency room, the patient was found to have a fever of 101.9, significant leukocytosis, and elevated lactic acid level. Lumbar puncture was performed.   The patient received antibiotics and was referred to the hospitalist service for evaluation for admission. CT scan of the head done on arrival at the emergency room did not show any acute intracranial abnormalities. No record of prior admission to this hospital.    PAST MEDICAL HISTORY:  Type 2 diabetes, hypertension, dyslipidemia. ALLERGIES:  NO KNOWN DRUG ALLERGIES. MEDICATIONS:  Amlodipine/olmesartan 5/40 one tablet daily, [place] 25 mg daily, Crestor 10 mg daily at bedtime, Janumet /1000 one tablet daily. FAMILY HISTORY:  This was reviewed. His father had hypertension. PAST SURGICAL HISTORY:  Not significant. SOCIAL HISTORY:  No history of tobacco abuse. The patient admits to social consumption of alcohol. REVIEW OF SYSTEMS:  HEAD, EYES, EARS, NOSE AND THROAT:  This is positive for confusion. No headache, no blurring of vision, no photophobia. RESPIRATORY SYSTEM:  This is positive for shortness of breath. No cough, no hemoptysis. CARDIOVASCULAR SYSTEM:  No chest pain, no orthopnea, no palpitation. GASTROINTESTINAL SYSTEM:  No nausea or vomiting. No diarrhea. No constipation. GENITOURINARY SYSTEM:  No dysuria, no urgency, and no frequency. All other systems are reviewed and they are negative. PHYSICAL EXAMINATION:  GENERAL APPEARANCE:  The patient appeared ill, in moderate distress. VITAL SIGNS:  On arrival at the emergency room, temperature 101.9, pulse 97, respiratory rate 13, blood pressure 119/72, oxygen saturation 94%. HEENT:  Head:  Normocephalic, atraumatic. Eyes:  Normal eye movement. No redness, no drainage, no discharge. Ears:  Normal external ears with no evidence of drainage. Nose:  No deformity and no drainage. Mouth and Throat:  No visible oral lesion. Poor oral hygiene with a lot of missing teeth. No active bleeding. NECK:  Neck is supple. No JVD, no thyromegaly. CHEST:  Clear breath sounds. No wheezing, no crackles. HEART:  Normal S1 and S2, regular.   No clinically appreciable murmur. ABDOMEN:  Soft, nontender. Normal bowel sounds. CNS:  Alert, oriented to person and place. No gross focal neurological deficit. EXTREMITIES:  No edema. Pulses 2+ bilaterally. MUSCULOSKELETAL SYSTEM:  No evidence of joint deformity or swelling. SKIN:  No active skin lesions seen in the exposed part of the body. PSYCHIATRY:  Unable to assess mood and affect. LYMPHATIC SYSTEM:  No cervical lymphadenopathy. DIAGNOSTIC DATA:  EKG shows sinus rhythm and nonspecific ST and T-wave abnormalities. CT scan of the head without contrast, no acute intracranial abnormalities. Chest x-ray shows mild central vascular fullness and interstitial prominence. LABORATORY DATA:  Hematology:  WBC 19.5, hemoglobin 12.7, hematocrit 39.7, platelets 644. Cardiac profile:  Troponin less than 0.05. Chemistry:  Sodium 139, potassium 3.8, chloride 101, CO2 28, glucose 172, BUN 21, creatinine 1.35, calcium 9.3, total bilirubin 0.8, ALT 28, AST 29, alkaline phosphatase 64, total protein 8.0, albumin level 3.7, globulin 4.3. ABG done in the emergency room:  PH 7.42, pCO2 41, pO2 65, bicarbonate 26.5, oxygen saturation 93%; this was done on room air. Lactic acid level 2.6. ASSESSMENT:  1. Acute delirium. 2.  Acute respiratory failure with hypoxia. 3.  Type 2 diabetes with hyperglycemia. 4.  Sepsis. 5.  Hypertension. 6.  Dyslipidemia. PLAN:  1. Acute delirium. This is most likely due to metabolic event. We will identify and treat underlying etiological factors which include sepsis. This will be discussed below. It could also be as a result of the anesthetic agent given to the patient prior to dental procedure. We will monitor the patient closely. We will obtain MRI of the brain to evaluate the patient for stroke. The patient may require Neurology consult if there is no improvement in the next 24-48 hours. We will check a TSH level. We will also check urine drug screen.   2.  Acute respiratory failure with hypoxia. We will identify and treat underlying etiological factors. We will obtain CTA of the chest to rule out pulmonary embolism and also to evaluate the patient for pneumonia. We will continue with supplemental oxygen. We will check BNP level to determine if there is a cardiac component to the patient's shortness of breath. We will check serial cardiac markers to rule out acute myocardial infarction as a possible cause of acute respiratory failure with hypoxia. 3.  Type 2 diabetes with hyperglycemia. The patient will be placed on sliding scale with insulin coverage. Recheck hemoglobin A1c level. We will hold oral agents until the time of discharge from the hospital.  4.  Sepsis. The diagnosis of sepsis is supported by elevated lactic acid level, fever, and significant leukocytosis. We will start the patient on vancomycin and Zosyn. We will await the results of lumbar puncture done in the emergency room. We will obtain CT scan of the maxillofacial bone to evaluate the patient for abscess as a possible source of infection. We will await the results of CT scan of the chest to evaluate the patient for pneumonia as a possible source of sepsis. We will also await the results of CT scan of the abdomen and pelvis to evaluate the patient for possible source of sepsis. We will check lipase level. We will check urinalysis. We will await blood culture results. The patient may require Infectious Disease consult if there is no improvement in the next 24-48 hours. 5.  Hypertension. We will hold antihypertensive medication in the setting of sepsis with potential for hypotension. 6.  Dyslipidemia. We will resume preadmission medication. OTHER ISSUES:  Code status: The patient is a full code. We will request SCD for DVT prophylaxis. FUNCTIONAL STATUS PRIOR TO ADMISSION:  The patient came from home. The patient is ambulatory with a cane.     COVID PRECAUTION:  The patient was wearing a face mask. I was wearing a face mask, cap, and gloves for this patient's encounter. The patient will be screened for COVID-19 virus infection. SEPSIS REASSESSMENT COMPLETED:  The patient has improved capillary refill, improved cardiopulmonary examination and moist mucous membrane.         MD CHEIKH [email or web address]  D:  10/31/2020 4:55  T:  10/31/2020 6:26  JOB #:  [id number]

## 2020-10-31 NOTE — PROGRESS NOTES
TRANSFER - IN REPORT:    Verbal report received from [id number] RN(name) on Belen Santillan  being received from [facility](unit) for routine progression of care      Report consisted of patients Situation, Background, Assessment and   Recommendations(SBAR). Information from the following report(s) SBAR, Kardex, ED Summary, Intake/Output, MAR, Recent Results, Med Rec Status and Cardiac Rhythm NSR/ST was reviewed with the receiving nurse. Opportunity for questions and clarification was provided. 0000-Assessment completed upon patients arrival to unit and care assumed. DUAL SKIN ASSESSMENT:  Primary Nurse Lukas Musa RN and Liliane RN performed a dual skin assessment on this patient: No impairment noted. Sulaiman score is 18          Problem: Risk for Spread of Infection  Goal: Prevent transmission of infectious organism to others  Outcome: Progressing Towards GoaL  Pt on droplet plus precautions for COVID rule out           Problem: Falls - Risk of  Goal: Absence of Falls  Outcome: Progressing Towards Goal   Call light in reach, Patient to call for help with toileting needs, Stay With Me (per policy), Toileting schedule/hourly rounds, Urinal in reach              Problem: Pressure Injury - Risk of  Goal: Prevention of pressure injury  Outcome: Progressing Towards Goal  Increase time out of bed, Pressure redistribution bed/mattress(bed type), PT/OT evaluation, pt demonstrated that he can turn himself without any assistance              Problem: Breathing Pattern - Ineffective  Goal: Absence of hypoxia  Outcome: Progressing Towards Goal  Pt on room air, O2 saturation greater than 90%, will continue to monitor          0230-Spoke to pt's son Juan M Coppola, given full update, will continue to update at [phone]        Hourly rounding done, pt in NSR, on room air, O2 saturation greater than 90%, on bedrest, using urinal, urine yellow and clear, no complaints of pain throughout shift. 0800-Bedside shift change report given to Pratima Ocampo (oncoming nurse) by Calderon Brooks  (offgoing nurse). Report included the following information SBAR, Kardex, ED Summary, Intake/Output, MAR, Recent Results, Med Rec Status and Cardiac Rhythm NSR.      Last 3 Recorded Weights in this Encounter    10/30/20 1705 10/31/20 0000   Weight: 122.4 kg (269 lb 13.5 oz) 117.9 kg (259 lb 14.8 oz)

## 2020-10-31 NOTE — PROGRESS NOTES
Problem: Sepsis: Day 2  Goal: *Hemodynamically stable  Outcome: Progressing Towards Goal  VSS Patient Vitals for the past 12 hrs:   Temp Pulse Resp BP SpO2   10/31/20 1603 100.2 °F (37.9 °C) 98 18 (!) 143/66 91 %   10/31/20 1117 99.8 °F (37.7 °C) 97 -- 118/86 94 %   10/31/20 0715 99.3 °F (37.4 °C) 82 17 121/71 99 %       Goal: *Tolerating diet  Outcome: Progressing Towards Goal  Pt tolerating prescribed dental soft diet  Goal: Respiratory  Outcome: Progressing Towards Goal  Pt on RA saturating above 95%   Goal: Treatments/Interventions/Procedures  Outcome: Progressing Towards Goal  Trending lactics - now stable, Daily BMP and CBC ordered for pt     Goal: Medications  Outcome: Progressing Towards Goal  Pt on IVF and ABx - Cefepime, Vancomycin and Zosyn     1930 Bedside shift change report given to Hazel (oncoming nurse) by Tessa Pikcett (offgoing nurse). Report included the following information SBAR, Kardex, Intake/Output, MAR, Accordion, and Recent Results.

## 2020-10-31 NOTE — PROGRESS NOTES
Transition of Care Plan  RUR N/A Observation status    Observation notice provided in writing to patient and/or caregiver as well as verbal explanation of the policy. Patients who are in outpatient status also receive the Observation notice. Signed Medicare and State letters signed and placed in chart. MRI pending  COVID 19  Negative 10/30/20    Reason for Admission:   Acute delirium, Acute respiratory failure with hypoxia, sepsis--receiving IV abx   Had dental work at [facility]-- had AMS after dental work                   RUR Score:     N/A Observation status                 Plan for utilizing home health:      Not indicated at this time    PCP: First and Last name:  Physician in [place]   Name of Practice:    Are you a current patient: Yes/No: yes   Approximate date of last visit:    Can you participate in a virtual visit with your PCP: yes                    Current Advanced Directive/Advance Care Plan: Does not have an AMD-- wife to make decisions                         Transition of Care Plan:      Home to [place] with wife and medical follow up    Cm met with patient in his room to introduce self and explain role. He was alert and oriented. Confirmed demographics, and insurance. He was in [place] having dental work done through [facility]. His son Mayuri Bhatt [phone] drove him to [place]. Patient lives with his wife in their home in [place] [address]. He was self care and independent-driving to daily activities prior to admission Patient is retired from the [employer]. Patient plans to return home when discharged. Son will transport him.              Care Management Interventions  PCP Verified by CM: Yes([place])  Mode of Transport at Discharge: (car)  Transition of Care Consult (CM Consult): Discharge Planning  Discharge Durable Medical Equipment: No  Physical Therapy Consult: No  Occupational Therapy Consult: No  Speech Therapy Consult: No  Current Support Network: Lives with Spouse(lives with wife in [place]. . self care and indepedent.   No AMD  wife make decisions)  Confirm Follow Up Transport: Family(self)  Discharge Location  Discharge Placement: Home

## 2020-11-01 ENCOUNTER — APPOINTMENT (OUTPATIENT)
Dept: CT IMAGING | Age: 70
DRG: 871 | End: 2020-11-01
Attending: INTERNAL MEDICINE
Payer: MEDICARE

## 2020-11-01 PROBLEM — G93.40 ENCEPHALOPATHY: Status: ACTIVE | Noted: 2020-11-01

## 2020-11-01 LAB
ANION GAP SERPL CALC-SCNC: 2 MMOL/L (ref 5–15)
APPEARANCE UR: NORMAL
ATRIAL RATE: 99 BPM
BACTERIA URNS QL MICRO: NORMAL /HPF
BASOPHILS # BLD: 0 K/UL (ref 0–0.1)
BASOPHILS NFR BLD: 0 % (ref 0–1)
BILIRUB UR QL: NORMAL
BUN SERPL-MCNC: 7 MG/DL (ref 6–20)
BUN/CREAT SERPL: 7 (ref 12–20)
CALCIUM SERPL-MCNC: 8.7 MG/DL (ref 8.5–10.1)
CALCULATED P AXIS, ECG09: 63 DEGREES
CALCULATED R AXIS, ECG10: -43 DEGREES
CALCULATED T AXIS, ECG11: 61 DEGREES
CHLORIDE SERPL-SCNC: 103 MMOL/L (ref 97–108)
CO2 SERPL-SCNC: 32 MMOL/L (ref 21–32)
COLOR UR: NORMAL
CREAT SERPL-MCNC: 1.01 MG/DL (ref 0.7–1.3)
DIAGNOSIS, 93000: NORMAL
DIFFERENTIAL METHOD BLD: ABNORMAL
EOSINOPHIL # BLD: 0.1 K/UL (ref 0–0.4)
EOSINOPHIL NFR BLD: 0 % (ref 0–7)
EPITH CASTS URNS QL MICRO: NORMAL /LPF (ref 0–5)
ERYTHROCYTE [DISTWIDTH] IN BLOOD BY AUTOMATED COUNT: 13.3 % (ref 11.5–14.5)
GLUCOSE BLD STRIP.AUTO-MCNC: 138 MG/DL (ref 65–100)
GLUCOSE BLD STRIP.AUTO-MCNC: 139 MG/DL (ref 65–100)
GLUCOSE BLD STRIP.AUTO-MCNC: 143 MG/DL (ref 65–100)
GLUCOSE BLD STRIP.AUTO-MCNC: 155 MG/DL (ref 65–100)
GLUCOSE SERPL-MCNC: 147 MG/DL (ref 65–100)
GLUCOSE UR STRIP.AUTO-MCNC: NORMAL MG/DL
HCT VFR BLD AUTO: 29.1 % (ref 36.6–50.3)
HGB BLD-MCNC: 9.5 G/DL (ref 12.1–17)
HGB UR QL STRIP: NORMAL
IMM GRANULOCYTES # BLD AUTO: 0.1 K/UL (ref 0–0.04)
IMM GRANULOCYTES NFR BLD AUTO: 0 % (ref 0–0.5)
KETONES UR QL STRIP.AUTO: NORMAL MG/DL
LEUKOCYTE ESTERASE UR QL STRIP.AUTO: NORMAL
LYMPHOCYTES # BLD: 1.8 K/UL (ref 0.8–3.5)
LYMPHOCYTES NFR BLD: 14 % (ref 12–49)
MCH RBC QN AUTO: 28.9 PG (ref 26–34)
MCHC RBC AUTO-ENTMCNC: 32.6 G/DL (ref 30–36.5)
MCV RBC AUTO: 88.4 FL (ref 80–99)
MONOCYTES # BLD: 0.9 K/UL (ref 0–1)
MONOCYTES NFR BLD: 7 % (ref 5–13)
NEUTS SEG # BLD: 10.5 K/UL (ref 1.8–8)
NEUTS SEG NFR BLD: 79 % (ref 32–75)
NITRITE UR QL STRIP.AUTO: NORMAL
NRBC # BLD: 0 K/UL (ref 0–0.01)
NRBC BLD-RTO: 0 PER 100 WBC
P-R INTERVAL, ECG05: 188 MS
PH UR STRIP: NORMAL [PH] (ref 5–8)
PLATELET # BLD AUTO: 151 K/UL (ref 150–400)
PMV BLD AUTO: 11.3 FL (ref 8.9–12.9)
POTASSIUM SERPL-SCNC: 3 MMOL/L (ref 3.5–5.1)
PROT UR STRIP-MCNC: NORMAL MG/DL
Q-T INTERVAL, ECG07: 366 MS
QRS DURATION, ECG06: 94 MS
QTC CALCULATION (BEZET), ECG08: 469 MS
RBC # BLD AUTO: 3.29 M/UL (ref 4.1–5.7)
RBC #/AREA URNS HPF: NORMAL /HPF (ref 0–5)
SERVICE CMNT-IMP: ABNORMAL
SODIUM SERPL-SCNC: 137 MMOL/L (ref 136–145)
SP GR UR REFRACTOMETRY: NORMAL (ref 1–1.03)
SP GR UR REFRACTOMETRY: NORMAL (ref 1–1.03)
UROBILINOGEN UR QL STRIP.AUTO: NORMAL EU/DL (ref 0.2–1)
VENTRICULAR RATE, ECG03: 99 BPM
WBC # BLD AUTO: 13.4 K/UL (ref 4.1–11.1)
WBC URNS QL MICRO: NORMAL /HPF (ref 0–4)

## 2020-11-01 PROCEDURE — 74011250637 HC RX REV CODE- 250/637: Performed by: INTERNAL MEDICINE

## 2020-11-01 PROCEDURE — 74011250636 HC RX REV CODE- 250/636: Performed by: HOSPITALIST

## 2020-11-01 PROCEDURE — 99218 HC RM OBSERVATION: CPT

## 2020-11-01 PROCEDURE — 96376 TX/PRO/DX INJ SAME DRUG ADON: CPT

## 2020-11-01 PROCEDURE — 74177 CT ABD & PELVIS W/CONTRAST: CPT

## 2020-11-01 PROCEDURE — 36415 COLL VENOUS BLD VENIPUNCTURE: CPT

## 2020-11-01 PROCEDURE — 82962 GLUCOSE BLOOD TEST: CPT

## 2020-11-01 PROCEDURE — 65660000000 HC RM CCU STEPDOWN

## 2020-11-01 PROCEDURE — 74011636637 HC RX REV CODE- 636/637: Performed by: INTERNAL MEDICINE

## 2020-11-01 PROCEDURE — 85025 COMPLETE CBC W/AUTO DIFF WBC: CPT

## 2020-11-01 PROCEDURE — 74011250636 HC RX REV CODE- 250/636: Performed by: INTERNAL MEDICINE

## 2020-11-01 PROCEDURE — 74011000258 HC RX REV CODE- 258: Performed by: INTERNAL MEDICINE

## 2020-11-01 PROCEDURE — 70486 CT MAXILLOFACIAL W/O DYE: CPT

## 2020-11-01 PROCEDURE — 74011000636 HC RX REV CODE- 636: Performed by: RADIOLOGY

## 2020-11-01 PROCEDURE — 74011250637 HC RX REV CODE- 250/637: Performed by: HOSPITALIST

## 2020-11-01 PROCEDURE — 80048 BASIC METABOLIC PNL TOTAL CA: CPT

## 2020-11-01 PROCEDURE — 71275 CT ANGIOGRAPHY CHEST: CPT

## 2020-11-01 PROCEDURE — 74011000258 HC RX REV CODE- 258: Performed by: RADIOLOGY

## 2020-11-01 RX ORDER — POTASSIUM CHLORIDE 750 MG/1
40 TABLET, FILM COATED, EXTENDED RELEASE ORAL 2 TIMES DAILY
Status: DISCONTINUED | OUTPATIENT
Start: 2020-11-01 | End: 2020-11-01 | Stop reason: ALTCHOICE

## 2020-11-01 RX ORDER — SODIUM CHLORIDE 0.9 % (FLUSH) 0.9 %
10 SYRINGE (ML) INJECTION
Status: COMPLETED | OUTPATIENT
Start: 2020-11-01 | End: 2020-11-01

## 2020-11-01 RX ORDER — POTASSIUM CHLORIDE 7.45 MG/ML
10 INJECTION INTRAVENOUS
Status: COMPLETED | OUTPATIENT
Start: 2020-11-01 | End: 2020-11-01

## 2020-11-01 RX ADMIN — POTASSIUM CHLORIDE 10 MEQ: 10 INJECTION, SOLUTION INTRAVENOUS at 10:13

## 2020-11-01 RX ADMIN — POTASSIUM CHLORIDE 10 MEQ: 10 INJECTION, SOLUTION INTRAVENOUS at 08:42

## 2020-11-01 RX ADMIN — Medication 10 ML: at 21:39

## 2020-11-01 RX ADMIN — IOPAMIDOL 100 ML: 755 INJECTION, SOLUTION INTRAVENOUS at 14:39

## 2020-11-01 RX ADMIN — IOHEXOL 50 ML: 240 INJECTION, SOLUTION INTRATHECAL; INTRAVASCULAR; INTRAVENOUS; ORAL at 14:39

## 2020-11-01 RX ADMIN — VANCOMYCIN HYDROCHLORIDE 1500 MG: 100 INJECTION, POWDER, LYOPHILIZED, FOR SOLUTION INTRAVENOUS at 12:58

## 2020-11-01 RX ADMIN — POTASSIUM CHLORIDE 10 MEQ: 10 INJECTION, SOLUTION INTRAVENOUS at 11:09

## 2020-11-01 RX ADMIN — Medication 10 ML: at 14:39

## 2020-11-01 RX ADMIN — Medication 10 ML: at 06:15

## 2020-11-01 RX ADMIN — POTASSIUM CHLORIDE 40 MEQ: 750 TABLET, FILM COATED, EXTENDED RELEASE ORAL at 17:39

## 2020-11-01 RX ADMIN — Medication 10 ML: at 13:00

## 2020-11-01 RX ADMIN — PIPERACILLIN AND TAZOBACTAM 3.38 G: 3; .375 INJECTION, POWDER, LYOPHILIZED, FOR SOLUTION INTRAVENOUS at 12:42

## 2020-11-01 RX ADMIN — ROSUVASTATIN CALCIUM 10 MG: 10 TABLET, FILM COATED ORAL at 21:39

## 2020-11-01 RX ADMIN — PIPERACILLIN AND TAZOBACTAM 3.38 G: 3; .375 INJECTION, POWDER, LYOPHILIZED, FOR SOLUTION INTRAVENOUS at 06:14

## 2020-11-01 RX ADMIN — ACETAMINOPHEN 650 MG: 325 TABLET ORAL at 20:29

## 2020-11-01 RX ADMIN — SODIUM CHLORIDE, SODIUM LACTATE, POTASSIUM CHLORIDE, AND CALCIUM CHLORIDE 100 ML/HR: 600; 310; 30; 20 INJECTION, SOLUTION INTRAVENOUS at 01:04

## 2020-11-01 RX ADMIN — ACETAMINOPHEN 650 MG: 325 TABLET ORAL at 14:33

## 2020-11-01 RX ADMIN — SODIUM CHLORIDE 100 ML: 900 INJECTION, SOLUTION INTRAVENOUS at 14:39

## 2020-11-01 RX ADMIN — INSULIN LISPRO 2 UNITS: 100 INJECTION, SOLUTION INTRAVENOUS; SUBCUTANEOUS at 12:42

## 2020-11-01 RX ADMIN — POTASSIUM CHLORIDE 40 MEQ: 750 TABLET, FILM COATED, EXTENDED RELEASE ORAL at 08:42

## 2020-11-01 RX ADMIN — PIPERACILLIN AND TAZOBACTAM 3.38 G: 3; .375 INJECTION, POWDER, LYOPHILIZED, FOR SOLUTION INTRAVENOUS at 20:29

## 2020-11-01 NOTE — PROGRESS NOTES
[facility] Adult  Hospitalist Group                                                                                          Hospitalist Progress Note  Angela Harley MD  Answering service: [phone] from in house phone        Date of Service:  2020  NAME:  Nafisa Finley  :  1950  MRN:  [id number]      Admission Summary: This is a 57-year-old man with a past medical history significant for type 2 diabetes, hypertension, dyslipidemia, was in his usual state of health until the day of his presentation at the emergency room when the patient developed a change in mental status. The patient reported to his dentist's office and underwent a number of tooth extractions. The procedure finishing at about 10:00 a.m. The patient developed a change in mental status after the procedure. He was monitored at the dentist's office without any improvement in his mental status. The patient was at baseline before the procedure according to report. The patient was also found to be hypoxic with oxygen saturation of 88%. The oxygen saturation improved with supplemental oxygen. Because of the persistent change in mental status, the patient was sent to the emergency room at the [facility] Emergency Room in [place]. The patient has no history of congestive heart failure or respiratory disease. There was no sick contact or contact with any person with COVID-19 virus infection. When the patient arrived at the emergency room, the patient was found to have a fever of 101.9, significant leukocytosis, and elevated lactic acid level. Lumbar puncture was performed. The patient received antibiotics and was referred to the hospitalist service for evaluation for admission. CT scan of the head done on arrival at the emergency room did not show any acute intracranial abnormalities.   No record of prior admission to this hospital.       Interval history / Subjective:    pt seen and examined AMS resolved his CT scans are still pending his blood pressure is stable oxygen saturation is a stable with nasal cannula patient alert oriented x3 discussed with the patient the need for completing CT scan and look for a possible site of infection     Assessment & Plan:     1. Acute delirium. metabolic encephalopathy resolved  - opiates related ? From dental procedure drug screen positive   -  We will obtain MRI of the brain to evaluate the patient for stroke stable. CT head negative     2. Acute respiratory failure with hypoxia. resolved   - pt seen breathing normally in RA while examined  - CT chest ordered pending    3. Type 2 diabetes with hyperglycemia. Cont SSI Recheck hemoglobin A1c level. We will hold oral agents until the time of discharge from the hospital.    4.  Sepsis. Unclear etiology CT scan ordered on admission is a still pending with RN  -  The diagnosis of sepsis is supported by elevated lactic acid level, fever, and significant leukocytosis. - Patient on vancomycin and Zosyn. LP shows normal CSF  - CT scan of the maxillofacial bone to evaluate the patient for abscess as a possible source of infection. - CT scan of the chest to evaluate the patient for pneumonia as a possible source of sepsis. - CT scan of the abdomen and pelvis to evaluate the patient for possible source of sepsis. 5.  Hypertension. We will hold antihypertensive medication in the setting of sepsis with potential for hypotension. 6.  Dyslipidemia. We will resume preadmission medication.     7. Hypokalemia - replete and repeat lab     Code status: Full  DVT prophylaxis: scd    Care Plan discussed with: Patient/Family and Nurse  Anticipated Disposition: Home w/Family  Anticipated Discharge: 24 hours to 48 hours   Called dental surgeon left      Hospital Problems  Date Reviewed: 10/31/2020          Codes Class Noted POA    * (Principal) Acute delirium ICD-10-CM: R41.0  ICD-9-CM: 780.09  10/30/2020 Yes Review of Systems:   A comprehensive review of systems was negative. Vital Signs:    Last 24hrs VS reviewed since prior progress note. Most recent are:  Visit Vitals  /65 (BP 1 Location: Right arm, BP Patient Position: At rest)   Pulse 77   Temp 98.2 °F (36.8 °C)   Resp 24   Ht 6' 2\" (1.88 m)   Wt 117.1 kg (258 lb 2.5 oz)   SpO2 95%   BMI 33.15 kg/m²         Intake/Output Summary (Last 24 hours) at 11/1/2020 1014  Last data filed at 11/1/2020 0744  Gross per 24 hour   Intake 3266.67 ml   Output 3650 ml   Net -383.33 ml        Physical Examination:             Constitutional:  No acute distress, cooperative, pleasant    ENT:  Oral mucosa moist, oropharynx benign. Resp:  CTA bilaterally. No wheezing/rhonchi/rales. No accessory muscle use   CV:  Regular rhythm, normal rate, no murmurs, gallops, rubs    GI:  Soft, non distended, non tender. normoactive bowel sounds, no hepatosplenomegaly     Musculoskeletal:  No edema, warm, 2+ pulses throughout    Neurologic:  Moves all extremities. AAOx3, CN II-XII reviewed     Psych:  Good insight, Not anxious nor agitated. Data Review:    I personally reviewed  Image and labs      Labs:     Recent Labs     11/01/20  0414 10/31/20  0316   WBC 13.4* 20.7*   HGB 9.5* 11.0*   HCT 29.1* 34.3*    197     Recent Labs     11/01/20  0414 10/31/20  1133 10/31/20  0316    138 140   K 3.0* 3.1* 2.7*    106 103   CO2 32 26 29   BUN 7 13 21*   CREA 1.01 1.08 1.20   * 170* 139*   CA 8.7 8.1* 8.6   MG  --   --  1.7   PHOS  --   --  3.6     Recent Labs     10/31/20  0316 10/30/20  1725   ALT 19 28   AP 55 64   TBILI 0.7 0.8   TP 6.6 8.0   ALB 3.0* 3.7   GLOB 3.6 4.3*   LPSE 35*  --      No results for input(s): INR, PTP, APTT, INREXT, INREXT in the last 72 hours. No results for input(s): FE, TIBC, PSAT, FERR in the last 72 hours. No results found for: FOL, RBCF   No results for input(s): PH, PCO2, PO2 in the last 72 hours.   Recent Labs     10/31/20  0316 10/30/20  1725   TROIQ <0.05 <0.05     No results found for: CHOL, CHOLX, CHLST, CHOLV, HDL, HDLP, LDL, LDLC, DLDLP, TGLX, TRIGL, TRIGP, CHHD, CHHDX  Lab Results   Component Value Date/Time    Glucose (POC) 138 (H) 11/01/2020 08:11 AM    Glucose (POC) 173 (H) 10/31/2020 10:35 PM    Glucose (POC) 173 (H) 10/31/2020 11:42 AM    Glucose (POC) 141 (H) 10/31/2020 09:04 AM    Glucose (POC) 185 (H) 10/30/2020 05:10 PM     Lab Results   Component Value Date/Time    Color YELLOW/STRAW 10/31/2020 03:16 AM    Appearance CLEAR 10/31/2020 03:16 AM    Specific gravity 1.030 10/31/2020 03:16 AM    pH (UA) 5.0 10/31/2020 03:16 AM    Protein Negative 10/31/2020 03:16 AM    Glucose Negative 10/31/2020 03:16 AM    Ketone Negative 10/31/2020 03:16 AM    Bilirubin Negative 10/31/2020 03:16 AM    Urobilinogen 0.2 10/31/2020 03:16 AM    Nitrites Negative 10/31/2020 03:16 AM    Leukocyte Esterase Negative 10/31/2020 03:16 AM         Medications Reviewed:     Current Facility-Administered Medications   Medication Dose Route Frequency    potassium chloride 10 mEq in 100 ml IVPB  10 mEq IntraVENous Q1H    sodium chloride (NS) flush 5-10 mL  5-10 mL IntraVENous PRN    sodium chloride (NS) flush 5-40 mL  5-40 mL IntraVENous Q8H    sodium chloride (NS) flush 5-40 mL  5-40 mL IntraVENous PRN    acetaminophen (TYLENOL) tablet 650 mg  650 mg Oral Q6H PRN    Or    acetaminophen (TYLENOL) suppository 650 mg  650 mg Rectal Q6H PRN    polyethylene glycol (MIRALAX) packet 17 g  17 g Oral DAILY PRN    promethazine (PHENERGAN) tablet 12.5 mg  12.5 mg Oral Q6H PRN    Or    ondansetron (ZOFRAN) injection 4 mg  4 mg IntraVENous Q6H PRN    piperacillin-tazobactam (ZOSYN) 3.375 g in 0.9% sodium chloride (MBP/ADV) 100 mL  3.375 g IntraVENous Q8H    insulin lispro (HUMALOG) injection   SubCUTAneous AC&HS    glucose chewable tablet 16 g  4 Tab Oral PRN    glucagon (GLUCAGEN) injection 1 mg  1 mg IntraMUSCular PRN    rosuvastatin (CRESTOR) tablet 10 mg  10 mg Oral QHS    vancomycin dosing per pharmacy   Other Rx Dosing/Monitoring    vancomycin (VANCOCIN) 1500 mg in  ml infusion  1,500 mg IntraVENous Q16H    dextrose 10 % infusion 125-250 mL  125-250 mL IntraVENous PRN    potassium chloride SR (KLOR-CON 10) tablet 40 mEq  40 mEq Oral BID    lactated Ringers infusion  50 mL/hr IntraVENous CONTINUOUS     ______________________________________________________________________  EXPECTED LENGTH OF STAY: - - -  ACTUAL LENGTH OF STAY:          0                 Alee Baer MD

## 2020-11-01 NOTE — PROGRESS NOTES
Problem: Sepsis: Day 3  Goal: *Oxygen saturation within defined limits  Outcome: Progressing Towards Goal  Pt maintaining O2 sats above 95% on RA  Goal: *Tolerating diet  Outcome: Progressing Towards Goal  Pt tolerating prescribed dental diet  Goal: Activity/Safety  Outcome: Progressing Towards Goal  Pt OOB with max assist. Needs PT/OT  Goal: Medications  Outcome: Progressing Towards Goal  Pt on Vanc, Zosyn and IVF    1930 Bedside shift change report given to [address] [place] [address] (oncoming nurse) by [facility] Heart (offgoing nurse). Report included the following information . SBAR, Kardex, Intake/Output, MAR, Accordion, and Recent Results

## 2020-11-01 NOTE — PROGRESS NOTES
Day #2 of Vancomycin  Indication:  sepsis  Current regimen:  1500mg IV every 16 hours (to start today)  Abx regimen:  vanc + zosyn  ID Following ?: NO  Concomitant nephrotoxic drugs (requires more frequent monitoring): Contrast agents (ordered for  - not yet given)  Frequency of BMP?: daily    Recent Labs     20  0414 10/31/20  1133 10/31/20  0316 10/30/20  1725   WBC 13.4*  --  20.7* 19.5*   CREA 1.01 1.08 1.20 1.35*   BUN 7 13 21* 21*     Est CrCl: 95 ml/min; UO: >1 ml/kg/hr  Temp (24hrs), Av.3 °F (37.4 °C), Min:98.2 °F (36.8 °C), Max:100.2 °F (37.9 °C)    Cultures:   10/30 blood - NGTD, prelim  10/30 CSF - NGTD, prelim    Goal trough = 15 - 20 mcg/mL    Recent trough history (date/time/level/dose/action taken):  N/A    Plan: Continue current regimen. WBCs are down significantly from yesterday.  Trough level ordered for  @ 0400 (prior to the 4th total dose)    Edward Yan, PharmD, BCPP, [facility] Specialist, [address] [place] [address]

## 2020-11-01 NOTE — PROGRESS NOTES
Problem: Falls - Risk of  Goal: Absence of Falls  Outcome: Progressing Towards Goal  Call light in reach, Patient to call for help with toileting needs, Stay With Me (per policy), Toileting schedule/hourly rounds              Problem: Pressure Injury - Risk of  Goal: Prevention of pressure injury  Outcome: Progressing Towards Goal  Absorbent underpads, Contain wound drainage, Internal/External urinary devices, Minimize layers, Lift sheet, Moisture barrier, Check for incontinence Q2 hours and as needed, Increase time out of bed, Pressure redistribution bed/mattress(bed type), PT/OT evaluation, pt demonstrated that he can turn himself without any assistance           Problem: Breathing Pattern - Ineffective  Goal: Absence of hypoxia  Outcome: Progressing Towards Goal  Pt on room air, O2 saturation greater than 90%, will continue to monitor           Problem: Diabetes Self-Management  Goal: Monitoring blood glucose, interpreting and using results  Outcome: Progressing Towards Goal  Pt on ACHS blood glucose checks, treated with sliding scale insulin as indicated, no coverage need tonight           Problem: Sepsis: Day 2  Goal: Medications  Outcome: Progressing Towards Goal  Pt on Zosyn every 8 hours, and Vancomycin every 16 hours        Hourly rounding done, pt in NSR, on room air, O2 saturation greater than 90%, ambulated to bathroom, had small bowel movement, no complaints of pain throughout shift. 0800-Bedside shift change report given to Pratima Ocampo (oncoming nurse) by Jacek Parker RN (offgoing nurse). Report included the following information SBAR, Kardex, ED Summary, Intake/Output, MAR, Recent Results, Med Rec Status and Cardiac Rhythm NSR.      Last 3 Recorded Weights in this Encounter    10/30/20 1705 10/31/20 0000 11/01/20 0400   Weight: 122.4 kg (269 lb 13.5 oz) 117.9 kg (259 lb 14.8 oz) 117.1 kg (258 lb 2.5 oz)

## 2020-11-02 LAB
ANION GAP SERPL CALC-SCNC: 13 MMOL/L (ref 5–15)
BUN SERPL-MCNC: 8 MG/DL (ref 6–20)
BUN/CREAT SERPL: 9 (ref 12–20)
CALCIUM SERPL-MCNC: 8.4 MG/DL (ref 8.5–10.1)
CHLORIDE SERPL-SCNC: 103 MMOL/L (ref 97–108)
CO2 SERPL-SCNC: 22 MMOL/L (ref 21–32)
COMMENT, HOLDF: NORMAL
COMMENT, HOLDF: NORMAL
CREAT SERPL-MCNC: 0.93 MG/DL (ref 0.7–1.3)
DATE LAST DOSE: NORMAL
ERYTHROCYTE [DISTWIDTH] IN BLOOD BY AUTOMATED COUNT: 12.9 % (ref 11.5–14.5)
GLUCOSE BLD STRIP.AUTO-MCNC: 141 MG/DL (ref 65–100)
GLUCOSE BLD STRIP.AUTO-MCNC: 151 MG/DL (ref 65–100)
GLUCOSE BLD STRIP.AUTO-MCNC: 168 MG/DL (ref 65–100)
GLUCOSE BLD STRIP.AUTO-MCNC: 238 MG/DL (ref 65–100)
GLUCOSE SERPL-MCNC: 103 MG/DL (ref 65–100)
HCT VFR BLD AUTO: 32.2 % (ref 36.6–50.3)
HGB BLD-MCNC: 10.5 G/DL (ref 12.1–17)
MCH RBC QN AUTO: 28.7 PG (ref 26–34)
MCHC RBC AUTO-ENTMCNC: 32.6 G/DL (ref 30–36.5)
MCV RBC AUTO: 88 FL (ref 80–99)
NRBC # BLD: 0 K/UL (ref 0–0.01)
NRBC BLD-RTO: 0 PER 100 WBC
PLATELET # BLD AUTO: 190 K/UL (ref 150–400)
PMV BLD AUTO: 11.7 FL (ref 8.9–12.9)
POTASSIUM SERPL-SCNC: 3.9 MMOL/L (ref 3.5–5.1)
RBC # BLD AUTO: 3.66 M/UL (ref 4.1–5.7)
REPORTED DOSE,DOSE: NORMAL UNITS
REPORTED DOSE/TIME,TMG: NORMAL
SAMPLES BEING HELD,HOLD: NORMAL
SAMPLES BEING HELD,HOLD: NORMAL
SERVICE CMNT-IMP: ABNORMAL
SODIUM SERPL-SCNC: 138 MMOL/L (ref 136–145)
VANCOMYCIN TROUGH SERPL-MCNC: 6.8 UG/ML (ref 5–10)
WBC # BLD AUTO: 12.3 K/UL (ref 4.1–11.1)

## 2020-11-02 PROCEDURE — 80202 ASSAY OF VANCOMYCIN: CPT

## 2020-11-02 PROCEDURE — 82962 GLUCOSE BLOOD TEST: CPT

## 2020-11-02 PROCEDURE — 74011636637 HC RX REV CODE- 636/637: Performed by: INTERNAL MEDICINE

## 2020-11-02 PROCEDURE — 74011250636 HC RX REV CODE- 250/636: Performed by: HOSPITALIST

## 2020-11-02 PROCEDURE — 74011250636 HC RX REV CODE- 250/636: Performed by: INTERNAL MEDICINE

## 2020-11-02 PROCEDURE — 74011250637 HC RX REV CODE- 250/637: Performed by: INTERNAL MEDICINE

## 2020-11-02 PROCEDURE — 36415 COLL VENOUS BLD VENIPUNCTURE: CPT

## 2020-11-02 PROCEDURE — 65660000000 HC RM CCU STEPDOWN

## 2020-11-02 PROCEDURE — 74011000258 HC RX REV CODE- 258: Performed by: INTERNAL MEDICINE

## 2020-11-02 PROCEDURE — 74011250637 HC RX REV CODE- 250/637: Performed by: HOSPITALIST

## 2020-11-02 PROCEDURE — 87040 BLOOD CULTURE FOR BACTERIA: CPT

## 2020-11-02 PROCEDURE — 80048 BASIC METABOLIC PNL TOTAL CA: CPT

## 2020-11-02 PROCEDURE — 85027 COMPLETE CBC AUTOMATED: CPT

## 2020-11-02 RX ORDER — VANCOMYCIN/0.9 % SOD CHLORIDE 1.5G/250ML
1500 PLASTIC BAG, INJECTION (ML) INTRAVENOUS EVERY 12 HOURS
Status: DISCONTINUED | OUTPATIENT
Start: 2020-11-02 | End: 2020-11-02

## 2020-11-02 RX ORDER — VANCOMYCIN 1.75 GRAM/500 ML IN 0.9 % SODIUM CHLORIDE INTRAVENOUS
1750 EVERY 12 HOURS
Status: DISCONTINUED | OUTPATIENT
Start: 2020-11-02 | End: 2020-11-03

## 2020-11-02 RX ADMIN — ACETAMINOPHEN 650 MG: 325 TABLET ORAL at 15:42

## 2020-11-02 RX ADMIN — VANCOMYCIN HYDROCHLORIDE 1500 MG: 100 INJECTION, POWDER, LYOPHILIZED, FOR SOLUTION INTRAVENOUS at 04:54

## 2020-11-02 RX ADMIN — PIPERACILLIN AND TAZOBACTAM 3.38 G: 3; .375 INJECTION, POWDER, LYOPHILIZED, FOR SOLUTION INTRAVENOUS at 06:19

## 2020-11-02 RX ADMIN — INSULIN LISPRO 2 UNITS: 100 INJECTION, SOLUTION INTRAVENOUS; SUBCUTANEOUS at 12:40

## 2020-11-02 RX ADMIN — INSULIN LISPRO 2 UNITS: 100 INJECTION, SOLUTION INTRAVENOUS; SUBCUTANEOUS at 21:51

## 2020-11-02 RX ADMIN — Medication 10 ML: at 21:51

## 2020-11-02 RX ADMIN — SODIUM CHLORIDE, SODIUM LACTATE, POTASSIUM CHLORIDE, AND CALCIUM CHLORIDE 50 ML/HR: 600; 310; 30; 20 INJECTION, SOLUTION INTRAVENOUS at 12:43

## 2020-11-02 RX ADMIN — INSULIN LISPRO 2 UNITS: 100 INJECTION, SOLUTION INTRAVENOUS; SUBCUTANEOUS at 17:28

## 2020-11-02 RX ADMIN — PIPERACILLIN AND TAZOBACTAM 3.38 G: 3; .375 INJECTION, POWDER, LYOPHILIZED, FOR SOLUTION INTRAVENOUS at 23:52

## 2020-11-02 RX ADMIN — Medication 10 ML: at 14:06

## 2020-11-02 RX ADMIN — Medication 10 ML: at 06:29

## 2020-11-02 RX ADMIN — ROSUVASTATIN CALCIUM 10 MG: 10 TABLET, FILM COATED ORAL at 21:51

## 2020-11-02 RX ADMIN — VANCOMYCIN HYDROCHLORIDE 1750 MG: 10 INJECTION, POWDER, LYOPHILIZED, FOR SOLUTION INTRAVENOUS at 15:37

## 2020-11-02 RX ADMIN — INSULIN LISPRO 2 UNITS: 100 INJECTION, SOLUTION INTRAVENOUS; SUBCUTANEOUS at 09:47

## 2020-11-02 RX ADMIN — POTASSIUM CHLORIDE 40 MEQ: 750 TABLET, FILM COATED, EXTENDED RELEASE ORAL at 09:47

## 2020-11-02 RX ADMIN — PIPERACILLIN AND TAZOBACTAM 3.38 G: 3; .375 INJECTION, POWDER, LYOPHILIZED, FOR SOLUTION INTRAVENOUS at 14:06

## 2020-11-02 NOTE — PROGRESS NOTES
Bedside and Verbal shift change report given to Miguel Seaman RN (oncoming nurse) by Henri Anderson RN (offgoing nurse). Report included the following information SBAR, Kardex, ED Summary, Intake/Output, MAR, Accordion, Recent Results, Med Rec Status and Cardiac Rhythm NSR. Patient Vitals for the past 12 hrs:   Temp Pulse Resp BP SpO2   11/02/20 0723 98.3 °F (36.8 °C) 78 28 (!) 150/78 93 %   11/02/20 0407 98.5 °F (36.9 °C) 71 21 (!) 149/75 93 %   11/02/20 0109 98.4 °F (36.9 °C) 65 22 137/61 95 %       Last 3 Recorded Weights in this Encounter    10/31/20 0000 11/01/20 0400 11/02/20 0619   Weight: 117.9 kg (259 lb 14.8 oz) 117.1 kg (258 lb 2.5 oz) 121.1 kg (266 lb 15.6 oz)       Weight variance noted. Dayshift RN notified. Problem: Falls - Risk of  Goal: *Absence of Falls  Description: Document Lynda Posadas Fall Risk and appropriate interventions in the flowsheet.   Outcome: Progressing Towards Goal  Note: Fall Risk Interventions:  Mobility Interventions: Assess mobility with egress test, OT consult for ADLs, Patient to call before getting OOB, PT Consult for mobility concerns, PT Consult for assist device competence, Utilize walker, cane, or other assistive device    Mentation Interventions: Adequate sleep, hydration, pain control, Bed/chair exit alarm, Door open when patient unattended, Evaluate medications/consider consulting pharmacy, Increase mobility, More frequent rounding, Room close to nurse's station, Reorient patient, Toileting rounds, Update white board    Medication Interventions: Assess postural VS orthostatic hypotension, Evaluate medications/consider consulting pharmacy, Bed/chair exit alarm, Patient to call before getting OOB, Teach patient to arise slowly    Elimination Interventions: Bed/chair exit alarm, Call light in reach, Toilet paper/wipes in reach, Toileting schedule/hourly rounds              Problem: Pressure Injury - Risk of  Goal: *Prevention of pressure injury  Description: Document Sulaiman Scale and appropriate interventions in the flowsheet. Outcome: Progressing Towards Goal  Note: Pressure Injury Interventions:       Moisture Interventions: Internal/External urinary devices, Absorbent underpads, Minimize layers, Moisture barrier, Limit adult briefs, Check for incontinence Q2 hours and as needed, Assess need for specialty bed, Apply protective barrier, creams and emollients    Activity Interventions: Assess need for specialty bed, Increase time out of bed, Pressure redistribution bed/mattress(bed type), PT/OT evaluation    Mobility Interventions: Assess need for specialty bed, Pressure redistribution bed/mattress (bed type), PT/OT evaluation    Nutrition Interventions: Document food/fluid/supplement intake, Offer support with meals,snacks and hydration    Friction and Shear Interventions: Apply protective barrier, creams and emollients, HOB 30 degrees or less, Minimize layers                Problem: Breathing Pattern - Ineffective  Goal: *Absence of hypoxia  Outcome: Progressing Towards Goal  Note: Pt on RA. O2 sats maintained in 90s. No signs of respiratory distress. Problem: Sepsis: Day 3  Goal: Off Pathway (Use only if patient is Off Pathway)  Outcome: Progressing Towards Goal  Note: Pt receiving vancomycin and Zosyn. Pt temperature reached 101.4. Tylenol given and temperature was reduced to 98.4. Expected discharge today.

## 2020-11-02 NOTE — PROGRESS NOTES
Transition Plan of Care  RUR 10%-Low  Covid-negative  Patient is independent at baseline. He lives with his wife in [place]. Once medically stable for discharge, son will provide transport.   Lili Nevarez RN CRM  Ext [phone]

## 2020-11-02 NOTE — PROGRESS NOTES
Pharmacist Note - Vancomycin Dosing  Therapy day 3  Indication: sepsis of unclear etiology   Current regimen: 1500 mg IV Q16H    A Trough Level resulted at 6.8 mcg/mL which was obtained 15.5 hrs post-dose. Goal trough: 15 - 20 mcg/mL     Plan: Change to 1750 mg IV Q12H . Pharmacy will continue to monitor this patient daily for changes in clinical status and renal function.

## 2020-11-02 NOTE — PROGRESS NOTES
[facility] Adult  Hospitalist Group                                                                                          Hospitalist Progress Note  Claudia Martinez MD  Answering service: [phone] from in house phone        Date of Service:  2020  NAME:  Dallas Anguiano  :  1950  MRN:  [id number]      Admission Summary: This is a 71-year-old man with a past medical history significant for type 2 diabetes, hypertension, dyslipidemia, was in his usual state of health until the day of his presentation at the emergency room when the patient developed a change in mental status. The patient reported to his dentist's office and underwent a number of tooth extractions. The procedure finishing at about 10:00 a.m. The patient developed a change in mental status after the procedure. He was monitored at the dentist's office without any improvement in his mental status. The patient was at baseline before the procedure according to report. The patient was also found to be hypoxic with oxygen saturation of 88%. The oxygen saturation improved with supplemental oxygen. Because of the persistent change in mental status, the patient was sent to the emergency room at the [facility] in [place]. The patient has no history of congestive heart failure or respiratory disease. There was no sick contact or contact with any person with COVID-19 virus infection. When the patient arrived at the emergency room, the patient was found to have a fever of 101.9, significant leukocytosis, and elevated lactic acid level. Lumbar puncture was performed. The patient received antibiotics and was referred to the hospitalist service for evaluation for admission. CT scan of the head done on arrival at the emergency room did not show any acute intracranial abnormalities.   No record of prior admission to this hospital.       Interval history / Subjective:    pt seen and examined AMS resolved his CT scans are reviewed with him stable oxygen saturation is a stable with nasal cannula patient alert oriented x3 hehad fever overnight but wants to leave the hospital ASAP      Assessment & Plan:     1. Acute delirium. metabolic encephalopathy resolved  - opiates related ? From dental procedure drug screen positive   - CT head negative     2. Acute respiratory failure with hypoxia. resolved   - pt seen breathing normally in RA while examined  - CT chest reviewed     3. Type 2 diabetes with hyperglycemia. Cont SSI Recheck hemoglobin A1c level. We will hold oral agents until the time of discharge from the hospital.    4.  Sepsis. Unclear etiology may be mandibular abscess seen in CT maxillofacial   -  The diagnosis of sepsis is supported by elevated lactic acid level, fever, and significant leukocytosis. - Patient on vancomycin and Zosyn. LP shows normal CSF  - All CT reviewed      5. Hypertension. We will hold antihypertensive medication in the setting of sepsis with potential for hypotension. 6.  Dyslipidemia. We will resume preadmission medication. 7. Hypokalemia - replete and repeat lab     Code status: Full  DVT prophylaxis: scd    Care Plan discussed with: Patient/Family and Nurse  Anticipated Disposition: Home w/Family  Anticipated Discharge: 24 hours to 48 hours   Called dental surgeon left [facility] Problems  Date Reviewed: 10/31/2020          Codes Class Noted POA    Encephalopathy ICD-10-CM: G93.40  ICD-9-CM: 348.30  11/1/2020 Unknown        * (Principal) Acute delirium ICD-10-CM: R41.0  ICD-9-CM: 780.09  10/30/2020 Yes                Review of Systems:   A comprehensive review of systems was negative. Xr Chest Sngl V    Result Date: 10/30/2020  IMPRESSION: Mild central vascular fullness and interstitial prominence. Poor inspiratory effort. No focal infiltrate. .  . Ct Head Wo Cont    Result Date: 10/30/2020  IMPRESSION: 1.  No acute intracranial abnormality. 2. Microvascular ischemic, possible old ischemic and age-related change greater than expected for stated age. 3. Mild sinusitis. Ct Maxillofacial Wo Cont    Result Date: 11/1/2020  IMPRESSION: No fracture. Possible small right mandibular periapical dental abscesses. Cta Chest W Or W Wo Cont    Result Date: 11/1/2020  IMPRESSION: 1. No pulmonary emboli. 2. Groundglass airspace disease, nonspecific. Ct Abd Pelv W Cont    Result Date: 11/1/2020  IMPRESSION: No Acute Disease. Vital Signs:    Last 24hrs VS reviewed since prior progress note. Most recent are:  Visit Vitals  BP (!) 150/78 (BP 1 Location: Right arm, BP Patient Position: At rest)   Pulse 78   Temp 98.3 °F (36.8 °C)   Resp 28   Ht 6' 2\" (1.88 m)   Wt 121.1 kg (266 lb 15.6 oz)   SpO2 93%   BMI 34.28 kg/m²         Intake/Output Summary (Last 24 hours) at 11/2/2020 0781  Last data filed at 11/1/2020 2141  Gross per 24 hour   Intake 1877.5 ml   Output 2150 ml   Net -272.5 ml        Physical Examination:             Constitutional:  No acute distress, cooperative, pleasant    ENT:  Oral mucosa moist, oropharynx benign. Resp:  CTA bilaterally. No wheezing/rhonchi/rales. No accessory muscle use   CV:  Regular rhythm, normal rate, no murmurs, gallops, rubs    GI:  Soft, non distended, non tender. normoactive bowel sounds, no hepatosplenomegaly     Musculoskeletal:  No edema, warm, 2+ pulses throughout    Neurologic:  Moves all extremities. AAOx3, CN II-XII reviewed     Psych:  Good insight, Not anxious nor agitated.        Data Review:    I personally reviewed  Image and labs      Labs:     Recent Labs     11/01/20  0414 10/31/20  0316   WBC 13.4* 20.7*   HGB 9.5* 11.0*   HCT 29.1* 34.3*    197     Recent Labs     11/01/20 0414 10/31/20  1133 10/31/20  0316    138 140   K 3.0* 3.1* 2.7*    106 103   CO2 32 26 29   BUN 7 13 21*   CREA 1.01 1.08 1.20   * 170* 139*   CA 8.7 8.1* 8.6   MG  --   --  1.7   PHOS  -- --  3.6     Recent Labs     10/31/20  0316 10/30/20  1725   ALT 19 28   AP 55 64   TBILI 0.7 0.8   TP 6.6 8.0   ALB 3.0* 3.7   GLOB 3.6 4.3*   LPSE 35*  --      No results for input(s): INR, PTP, APTT, INREXT, INREXT in the last 72 hours. No results for input(s): FE, TIBC, PSAT, FERR in the last 72 hours. No results found for: FOL, RBCF   No results for input(s): PH, PCO2, PO2 in the last 72 hours.   Recent Labs     10/31/20  0316 10/30/20  1725   TROIQ <0.05 <0.05     No results found for: CHOL, CHOLX, CHLST, CHOLV, HDL, HDLP, LDL, LDLC, DLDLP, TGLX, TRIGL, TRIGP, CHHD, CHHDX  Lab Results   Component Value Date/Time    Glucose (POC) 151 (H) 11/02/2020 08:19 AM    Glucose (POC) 143 (H) 11/01/2020 08:42 PM    Glucose (POC) 139 (H) 11/01/2020 05:06 PM    Glucose (POC) 155 (H) 11/01/2020 12:13 PM    Glucose (POC) 138 (H) 11/01/2020 08:11 AM     Lab Results   Component Value Date/Time    Color YELLOW/STRAW 10/31/2020 03:16 AM    Appearance CLEAR 10/31/2020 03:16 AM    Specific gravity 1.030 10/31/2020 03:16 AM    Specific gravity PATIENT DISCHARGED BEFORE SAMPLE COLLECTED 10/30/2020 04:15 PM    pH (UA) 5.0 10/31/2020 03:16 AM    Protein Negative 10/31/2020 03:16 AM    Glucose Negative 10/31/2020 03:16 AM    Ketone Negative 10/31/2020 03:16 AM    Bilirubin Negative 10/31/2020 03:16 AM    Urobilinogen 0.2 10/31/2020 03:16 AM    Nitrites Negative 10/31/2020 03:16 AM    Leukocyte Esterase Negative 10/31/2020 03:16 AM    Epithelial cells PATIENT DISCHARGED BEFORE SAMPLE COLLECTED 10/30/2020 04:15 PM    Bacteria PATIENT DISCHARGED BEFORE SAMPLE COLLECTED 10/30/2020 04:15 PM    WBC PATIENT DISCHARGED BEFORE SAMPLE COLLECTED 10/30/2020 04:15 PM    RBC PATIENT DISCHARGED BEFORE SAMPLE COLLECTED 10/30/2020 04:15 PM         Medications Reviewed:     Current Facility-Administered Medications   Medication Dose Route Frequency    sodium chloride (NS) flush 5-10 mL  5-10 mL IntraVENous PRN    sodium chloride (NS) flush 5-40 mL 5-40 mL IntraVENous Q8H    sodium chloride (NS) flush 5-40 mL  5-40 mL IntraVENous PRN    acetaminophen (TYLENOL) tablet 650 mg  650 mg Oral Q6H PRN    Or    acetaminophen (TYLENOL) suppository 650 mg  650 mg Rectal Q6H PRN    polyethylene glycol (MIRALAX) packet 17 g  17 g Oral DAILY PRN    promethazine (PHENERGAN) tablet 12.5 mg  12.5 mg Oral Q6H PRN    Or    ondansetron (ZOFRAN) injection 4 mg  4 mg IntraVENous Q6H PRN    piperacillin-tazobactam (ZOSYN) 3.375 g in 0.9% sodium chloride (MBP/ADV) 100 mL  3.375 g IntraVENous Q8H    insulin lispro (HUMALOG) injection   SubCUTAneous AC&HS    glucose chewable tablet 16 g  4 Tab Oral PRN    glucagon (GLUCAGEN) injection 1 mg  1 mg IntraMUSCular PRN    rosuvastatin (CRESTOR) tablet 10 mg  10 mg Oral QHS    vancomycin dosing per pharmacy   Other Rx Dosing/Monitoring    vancomycin (VANCOCIN) 1500 mg in  ml infusion  1,500 mg IntraVENous Q16H    dextrose 10 % infusion 125-250 mL  125-250 mL IntraVENous PRN    potassium chloride SR (KLOR-CON 10) tablet 40 mEq  40 mEq Oral BID    lactated Ringers infusion  50 mL/hr IntraVENous CONTINUOUS     ______________________________________________________________________  EXPECTED LENGTH OF STAY: - - -  ACTUAL LENGTH OF STAY:          1                 Kristian Villalobos MD

## 2020-11-03 LAB
ANION GAP SERPL CALC-SCNC: 9 MMOL/L (ref 5–15)
ARTERIAL PATENCY WRIST A: YES
BASE EXCESS BLD CALC-SCNC: 6 MMOL/L
BASOPHILS # BLD: 0.1 K/UL (ref 0–0.1)
BASOPHILS NFR BLD: 1 % (ref 0–1)
BDY SITE: ABNORMAL
BUN SERPL-MCNC: 7 MG/DL (ref 6–20)
BUN/CREAT SERPL: 7 (ref 12–20)
CA-I BLD-SCNC: 1.18 MMOL/L (ref 1.12–1.32)
CALCIUM SERPL-MCNC: 9.2 MG/DL (ref 8.5–10.1)
CHLORIDE SERPL-SCNC: 96 MMOL/L (ref 97–108)
CO2 SERPL-SCNC: 27 MMOL/L (ref 21–32)
COMMENT, HOLDF: NORMAL
CREAT SERPL-MCNC: 1.03 MG/DL (ref 0.7–1.3)
DIFFERENTIAL METHOD BLD: ABNORMAL
EOSINOPHIL # BLD: 0 K/UL (ref 0–0.4)
EOSINOPHIL NFR BLD: 0 % (ref 0–7)
ERYTHROCYTE [DISTWIDTH] IN BLOOD BY AUTOMATED COUNT: 12.9 % (ref 11.5–14.5)
GAS FLOW.O2 O2 DELIVERY SYS: ABNORMAL L/MIN
GLUCOSE BLD STRIP.AUTO-MCNC: 146 MG/DL (ref 65–100)
GLUCOSE BLD STRIP.AUTO-MCNC: 153 MG/DL (ref 65–100)
GLUCOSE BLD STRIP.AUTO-MCNC: 155 MG/DL (ref 65–100)
GLUCOSE BLD STRIP.AUTO-MCNC: 158 MG/DL (ref 65–100)
GLUCOSE SERPL-MCNC: 160 MG/DL (ref 65–100)
HCO3 BLD-SCNC: 28.8 MMOL/L (ref 22–26)
HCT VFR BLD AUTO: 31.9 % (ref 36.6–50.3)
HGB BLD-MCNC: 10.4 G/DL (ref 12.1–17)
IMM GRANULOCYTES # BLD AUTO: 0.1 K/UL (ref 0–0.04)
IMM GRANULOCYTES NFR BLD AUTO: 1 % (ref 0–0.5)
LYMPHOCYTES # BLD: 1.6 K/UL (ref 0.8–3.5)
LYMPHOCYTES NFR BLD: 13 % (ref 12–49)
MCH RBC QN AUTO: 28.5 PG (ref 26–34)
MCHC RBC AUTO-ENTMCNC: 32.6 G/DL (ref 30–36.5)
MCV RBC AUTO: 87.4 FL (ref 80–99)
MONOCYTES # BLD: 1.5 K/UL (ref 0–1)
MONOCYTES NFR BLD: 12 % (ref 5–13)
NEUTS SEG # BLD: 9.7 K/UL (ref 1.8–8)
NEUTS SEG NFR BLD: 73 % (ref 32–75)
NRBC # BLD: 0 K/UL (ref 0–0.01)
NRBC BLD-RTO: 0 PER 100 WBC
O2/TOTAL GAS SETTING VFR VENT: 21 %
PCO2 BLD: 37.7 MMHG (ref 35–45)
PH BLD: 7.49 [PH] (ref 7.35–7.45)
PLATELET # BLD AUTO: 219 K/UL (ref 150–400)
PMV BLD AUTO: 11.9 FL (ref 8.9–12.9)
PO2 BLD: 57 MMHG (ref 80–100)
POTASSIUM SERPL-SCNC: 3.2 MMOL/L (ref 3.5–5.1)
RBC # BLD AUTO: 3.65 M/UL (ref 4.1–5.7)
SAMPLES BEING HELD,HOLD: NORMAL
SAO2 % BLD: 91 % (ref 92–97)
SERVICE CMNT-IMP: ABNORMAL
SODIUM SERPL-SCNC: 132 MMOL/L (ref 136–145)
SPECIMEN TYPE: ABNORMAL
TOTAL RESP. RATE, ITRR: 12
WBC # BLD AUTO: 13 K/UL (ref 4.1–11.1)

## 2020-11-03 PROCEDURE — 65660000000 HC RM CCU STEPDOWN

## 2020-11-03 PROCEDURE — 74011250637 HC RX REV CODE- 250/637: Performed by: HOSPITALIST

## 2020-11-03 PROCEDURE — 82962 GLUCOSE BLOOD TEST: CPT

## 2020-11-03 PROCEDURE — 74011250636 HC RX REV CODE- 250/636: Performed by: HOSPITALIST

## 2020-11-03 PROCEDURE — 87086 URINE CULTURE/COLONY COUNT: CPT

## 2020-11-03 PROCEDURE — 74011250636 HC RX REV CODE- 250/636: Performed by: INTERNAL MEDICINE

## 2020-11-03 PROCEDURE — 74011000258 HC RX REV CODE- 258: Performed by: INTERNAL MEDICINE

## 2020-11-03 PROCEDURE — 85025 COMPLETE CBC W/AUTO DIFF WBC: CPT

## 2020-11-03 PROCEDURE — 87899 AGENT NOS ASSAY W/OPTIC: CPT

## 2020-11-03 PROCEDURE — 0100U RESPIRATORY PANEL,PCR,NASOPHARYNGEAL: CPT

## 2020-11-03 PROCEDURE — 87449 NOS EACH ORGANISM AG IA: CPT

## 2020-11-03 PROCEDURE — 36415 COLL VENOUS BLD VENIPUNCTURE: CPT

## 2020-11-03 PROCEDURE — 82803 BLOOD GASES ANY COMBINATION: CPT

## 2020-11-03 PROCEDURE — 74011250637 HC RX REV CODE- 250/637: Performed by: INTERNAL MEDICINE

## 2020-11-03 PROCEDURE — 36600 WITHDRAWAL OF ARTERIAL BLOOD: CPT

## 2020-11-03 PROCEDURE — 80048 BASIC METABOLIC PNL TOTAL CA: CPT

## 2020-11-03 PROCEDURE — 87635 SARS-COV-2 COVID-19 AMP PRB: CPT

## 2020-11-03 PROCEDURE — 74011636637 HC RX REV CODE- 636/637: Performed by: INTERNAL MEDICINE

## 2020-11-03 RX ORDER — LOSARTAN POTASSIUM 50 MG/1
100 TABLET ORAL DAILY
Status: DISCONTINUED | OUTPATIENT
Start: 2020-11-03 | End: 2020-11-10 | Stop reason: HOSPADM

## 2020-11-03 RX ORDER — POTASSIUM CHLORIDE 750 MG/1
40 TABLET, FILM COATED, EXTENDED RELEASE ORAL
Status: COMPLETED | OUTPATIENT
Start: 2020-11-03 | End: 2020-11-03

## 2020-11-03 RX ORDER — NATEGLINIDE 120 MG/1
120 TABLET ORAL
COMMUNITY

## 2020-11-03 RX ORDER — AMLODIPINE BESYLATE 5 MG/1
5 TABLET ORAL DAILY
Status: DISCONTINUED | OUTPATIENT
Start: 2020-11-03 | End: 2020-11-10 | Stop reason: HOSPADM

## 2020-11-03 RX ORDER — LEVOFLOXACIN 5 MG/ML
500 INJECTION, SOLUTION INTRAVENOUS EVERY 24 HOURS
Status: COMPLETED | OUTPATIENT
Start: 2020-11-03 | End: 2020-11-09

## 2020-11-03 RX ADMIN — INSULIN LISPRO 2 UNITS: 100 INJECTION, SOLUTION INTRAVENOUS; SUBCUTANEOUS at 18:33

## 2020-11-03 RX ADMIN — VANCOMYCIN HYDROCHLORIDE 1750 MG: 10 INJECTION, POWDER, LYOPHILIZED, FOR SOLUTION INTRAVENOUS at 04:01

## 2020-11-03 RX ADMIN — INSULIN LISPRO 2 UNITS: 100 INJECTION, SOLUTION INTRAVENOUS; SUBCUTANEOUS at 10:13

## 2020-11-03 RX ADMIN — PIPERACILLIN AND TAZOBACTAM 3.38 G: 3; .375 INJECTION, POWDER, LYOPHILIZED, FOR SOLUTION INTRAVENOUS at 07:11

## 2020-11-03 RX ADMIN — POTASSIUM CHLORIDE 40 MEQ: 750 TABLET, FILM COATED, EXTENDED RELEASE ORAL at 14:14

## 2020-11-03 RX ADMIN — AMLODIPINE BESYLATE 5 MG: 5 TABLET ORAL at 12:18

## 2020-11-03 RX ADMIN — ROSUVASTATIN CALCIUM 10 MG: 10 TABLET, FILM COATED ORAL at 21:03

## 2020-11-03 RX ADMIN — ACETAMINOPHEN 650 MG: 325 TABLET ORAL at 12:21

## 2020-11-03 RX ADMIN — VANCOMYCIN HYDROCHLORIDE 1750 MG: 10 INJECTION, POWDER, LYOPHILIZED, FOR SOLUTION INTRAVENOUS at 16:12

## 2020-11-03 RX ADMIN — SODIUM CHLORIDE, SODIUM LACTATE, POTASSIUM CHLORIDE, AND CALCIUM CHLORIDE 50 ML/HR: 600; 310; 30; 20 INJECTION, SOLUTION INTRAVENOUS at 12:21

## 2020-11-03 RX ADMIN — Medication 10 ML: at 14:15

## 2020-11-03 RX ADMIN — PIPERACILLIN AND TAZOBACTAM 3.38 G: 3; .375 INJECTION, POWDER, LYOPHILIZED, FOR SOLUTION INTRAVENOUS at 14:14

## 2020-11-03 RX ADMIN — Medication 10 ML: at 06:00

## 2020-11-03 RX ADMIN — LEVOFLOXACIN 500 MG: 5 INJECTION, SOLUTION INTRAVENOUS at 19:27

## 2020-11-03 RX ADMIN — INSULIN LISPRO 2 UNITS: 100 INJECTION, SOLUTION INTRAVENOUS; SUBCUTANEOUS at 12:18

## 2020-11-03 RX ADMIN — ACETAMINOPHEN 650 MG: 325 TABLET ORAL at 19:27

## 2020-11-03 RX ADMIN — LOSARTAN POTASSIUM 100 MG: 50 TABLET, FILM COATED ORAL at 12:18

## 2020-11-03 RX ADMIN — PIPERACILLIN AND TAZOBACTAM 3.38 G: 3; .375 INJECTION, POWDER, LYOPHILIZED, FOR SOLUTION INTRAVENOUS at 21:03

## 2020-11-03 RX ADMIN — Medication 10 ML: at 21:03

## 2020-11-03 NOTE — CONSULTS
ID Consult Note  NAME:  Mariann Barnett   :   1950   MRN:   [id number]   Date/Time:  11/3/2020 6:14 PM  Subjective:   REASON FOR CONSULT: Fever  Chasity Ma is a 78 y.o. with a history of diabetes, hypertension and dyslipidemia. He was in his usual state of health with no symptoms until he went to his dentist on  for a tooth extraction. After the procedure, he developed altered mental status and was sent to the emergency room. In the ER he was found to have oxygen saturation of 88%. He was also found to have leukocytosis of 19,000. He was started on vancomycin and Zosyn. His Covid test came back negative. He had a spinal tap done which revealed normal WBCs in the CSF. He continued to have fevers while he has been here in the hospital sometimes going as high as 102 degrees. He tells me he does not have any nasal congestion, sore throat, headache, dyspnea, cough, abdominal pain, diarrhea, dysuria, rash. He does not have any back pain as well. He does complain of some joint stiffness of though. He has been tested for Covid again today and that test is pending. We are now being asked to see him in consult. There have been no alleviating or aggravating factors. Past Medical History:   Diagnosis Date    Diabetes ([address]    Hyperlipidemia  Hypertension    Past surgical history  None  Social History     Tobacco Use    Smoking status: Never Smoker    Smokeless tobacco: Never Used   Substance Use Topics    Alcohol use: Yes     Comment: social   He does not engage in recreational drug use    Family History  His father had hypertension    No Known Allergies   Home Medications:  Prior to Admission Medications   Prescriptions Last Dose Informant Patient Reported? Taking? SITagliptin-metFORMIN (Janumet XR) 100-1,000 mg TM24 Unknown at Unknown time  Yes No   Sig: Take  by mouth. amLODIPine-Olmesartan 5-40 mg tab   Yes Yes   Sig: Take 1 Tab by mouth daily.    chlorthalidone (HYGROTON) 25 mg tablet Yes Yes   Sig: Take 25 mg by mouth daily. nateglinide (STARLIX) 120 mg tablet   Yes Yes   Sig: Take 120 mg by mouth Before breakfast, lunch, and dinner. rosuvastatin (CRESTOR) 10 mg tablet   Yes Yes   Sig: Take 10 mg by mouth nightly.       Facility-Administered Medications: None     Hospital medications:  Current Facility-Administered Medications   Medication Dose Route Frequency    amLODIPine (NORVASC) tablet 5 mg  5 mg Oral DAILY    And    losartan (COZAAR) tablet 100 mg  100 mg Oral DAILY    vancomycin (VANCOCIN) 1750 mg in  ml infusion  1,750 mg IntraVENous Q12H    sodium chloride (NS) flush 5-10 mL  5-10 mL IntraVENous PRN    sodium chloride (NS) flush 5-40 mL  5-40 mL IntraVENous Q8H    sodium chloride (NS) flush 5-40 mL  5-40 mL IntraVENous PRN    acetaminophen (TYLENOL) tablet 650 mg  650 mg Oral Q6H PRN    Or    acetaminophen (TYLENOL) suppository 650 mg  650 mg Rectal Q6H PRN    polyethylene glycol (MIRALAX) packet 17 g  17 g Oral DAILY PRN    promethazine (PHENERGAN) tablet 12.5 mg  12.5 mg Oral Q6H PRN    Or    ondansetron (ZOFRAN) injection 4 mg  4 mg IntraVENous Q6H PRN    piperacillin-tazobactam (ZOSYN) 3.375 g in 0.9% sodium chloride (MBP/ADV) 100 mL  3.375 g IntraVENous Q8H    insulin lispro (HUMALOG) injection   SubCUTAneous AC&HS    glucose chewable tablet 16 g  4 Tab Oral PRN    glucagon (GLUCAGEN) injection 1 mg  1 mg IntraMUSCular PRN    rosuvastatin (CRESTOR) tablet 10 mg  10 mg Oral QHS    vancomycin dosing per pharmacy   Other Rx Dosing/Monitoring    dextrose 10 % infusion 125-250 mL  125-250 mL IntraVENous PRN    lactated Ringers infusion  50 mL/hr IntraVENous CONTINUOUS     REVIEW OF SYSTEMS:        Const:   negative fever, negative chills, negative weight loss  Eyes:   negative diplopia or visual changes, negative eye pain  ENT:   negative coryza, negative sore throat  Resp:   negative cough, hemoptysis, dyspnea  Cards:   negative for chest pain, palpitations, lower extremity edema  :  negative for frequency, dysuria and hematuria  GI:   Negative for hematemesis and hematochezia  Skin:   negative for rash and pruritus  Heme:   negative for easy bruising and gum/nose bleeding  MS:  negative for myalgias, arthralgias, back pain and muscle weakness  Neurolo:  negative for headaches, dizziness, vertigo, memory problems   Psych:  negative for hallucinations        Objective:   VITALS:    Visit Vitals  /63 (BP 1 Location: Right arm, BP Patient Position: At rest)   Pulse 69   Temp 99 °F (37.2 °C)   Resp 20   Ht 6' 2\" (1.88 m)   Wt 119 kg (262 lb 5.6 oz)   SpO2 98%   BMI 33.68 kg/m²     Temp (24hrs), Av.3 °F (37.4 °C), Min:97.8 °F (36.6 °C), Max:102.3 °F (39.1 °C)    PHYSICAL EXAM:   General:    Alert, cooperative, no distress, appears stated age. Head:   Normocephalic, without obvious abnormality, atraumatic. Eyes:   Conjunctivae clear, anicteric sclerae. Nose:  Nares normal.   Throat:    Lips and tongue normal.  No Thrush  Neck:  Supple, symmetrical    no carotid bruit and no JVD. :    No CVA tenderness  Lungs:   Clear to auscultation bilaterally. No Wheezing or Rhonchi. No rales. Heart:   Regular rate and rhythm,  no murmur, rub or gallop. Abdomen:   Soft, non-tender,not distended. Bowel sounds normal.   Extremities: Ankles are warm, tender and swollen. Skin:     No rashes or lesions.   Not Jaundiced  Lymph: Cervical normal  Neurologic: Full use of extraocular muscles, no facial asymmetry, tongue midline muscle strength 5 out of 5    LAB DATA REVIEWED:    Recent Results (from the past 48 hour(s))   GLUCOSE, POC    Collection Time: 20  8:42 PM   Result Value Ref Range    Glucose (POC) 143 (H) 65 - 100 mg/dL    Performed by Albania Erwin, TROUGH    Collection Time: 20  4:22 AM   Result Value Ref Range    Vancomycin,trough 6.8 5.0 - 10.0 ug/mL    Reported dose date NOT PROVIDED      Reported dose time: NOT PROVIDED Reported dose: NOT PROVIDED UNITS   SAMPLES BEING HELD    Collection Time: 11/02/20  4:22 AM   Result Value Ref Range    SAMPLES BEING HELD 1PST     COMMENT        Add-on orders for these samples will be processed based on acceptable specimen integrity and analyte stability, which may vary by analyte. METABOLIC PANEL, BASIC    Collection Time: 11/02/20  4:22 AM   Result Value Ref Range    Sodium 138 136 - 145 mmol/L    Potassium 3.9 3.5 - 5.1 mmol/L    Chloride 103 97 - 108 mmol/L    CO2 22 21 - 32 mmol/L    Anion gap 13 5 - 15 mmol/L    Glucose 103 (H) 65 - 100 mg/dL    BUN 8 6 - 20 MG/DL    Creatinine 0.93 0.70 - 1.30 MG/DL    BUN/Creatinine ratio 9 (L) 12 - 20      GFR est AA >60 >60 ml/min/1.73m2    GFR est non-AA >60 >60 ml/min/1.73m2    Calcium 8.4 (L) 8.5 - 10.1 MG/DL   GLUCOSE, POC    Collection Time: 11/02/20  8:19 AM   Result Value Ref Range    Glucose (POC) 151 (H) 65 - 100 mg/dL    Performed by Darren Munoz    CBC W/O DIFF    Collection Time: 11/02/20  9:58 AM   Result Value Ref Range    WBC 12.3 (H) 4.1 - 11.1 K/uL    RBC 3.66 (L) 4.10 - 5.70 M/uL    HGB 10.5 (L) 12.1 - 17.0 g/dL    HCT 32.2 (L) 36.6 - 50.3 %    MCV 88.0 80.0 - 99.0 FL    MCH 28.7 26.0 - 34.0 PG    MCHC 32.6 30.0 - 36.5 g/dL    RDW 12.9 11.5 - 14.5 %    PLATELET 575 682 - 159 K/uL    MPV 11.7 8.9 - 12.9 FL    NRBC 0.0 0  WBC    ABSOLUTE NRBC 0.00 0.00 - 0.01 K/uL   SAMPLES BEING HELD    Collection Time: 11/02/20  9:58 AM   Result Value Ref Range    SAMPLES BEING HELD 1PST     COMMENT        Add-on orders for these samples will be processed based on acceptable specimen integrity and analyte stability, which may vary by analyte.    GLUCOSE, POC    Collection Time: 11/02/20 12:19 PM   Result Value Ref Range    Glucose (POC) 141 (H) 65 - 100 mg/dL    Performed by [address] [place] [address], BLOOD    Collection Time: 11/02/20  4:48 PM    Specimen: Blood   Result Value Ref Range    Special Requests: NO SPECIAL REQUESTS      Culture result: NO GROWTH AFTER 11 HOURS     GLUCOSE, POC    Collection Time: 11/02/20  5:04 PM   Result Value Ref Range    Glucose (POC) 168 (H) 65 - 100 mg/dL    Performed by [address], POC    Collection Time: 11/02/20  9:02 PM   Result Value Ref Range    Glucose (POC) 238 (H) 65 - 100 mg/dL    Performed by Chichi Luster    METABOLIC PANEL, BASIC    Collection Time: 11/03/20  4:12 AM   Result Value Ref Range    Sodium 132 (L) 136 - 145 mmol/L    Potassium 3.2 (L) 3.5 - 5.1 mmol/L    Chloride 96 (L) 97 - 108 mmol/L    CO2 27 21 - 32 mmol/L    Anion gap 9 5 - 15 mmol/L    Glucose 160 (H) 65 - 100 mg/dL    BUN 7 6 - 20 MG/DL    Creatinine 1.03 0.70 - 1.30 MG/DL    BUN/Creatinine ratio 7 (L) 12 - 20      GFR est AA >60 >60 ml/min/1.73m2    GFR est non-AA >60 >60 ml/min/1.73m2    Calcium 9.2 8.5 - 10.1 MG/DL   SAMPLES BEING HELD    Collection Time: 11/03/20  4:12 AM   Result Value Ref Range    SAMPLES BEING HELD 1BLU,1LAV     COMMENT        Add-on orders for these samples will be processed based on acceptable specimen integrity and analyte stability, which may vary by analyte. CBC WITH AUTOMATED DIFF    Collection Time: 11/03/20  4:12 AM   Result Value Ref Range    WBC 13.0 (H) 4.1 - 11.1 K/uL    RBC 3.65 (L) 4.10 - 5.70 M/uL    HGB 10.4 (L) 12.1 - 17.0 g/dL    HCT 31.9 (L) 36.6 - 50.3 %    MCV 87.4 80.0 - 99.0 FL    MCH 28.5 26.0 - 34.0 PG    MCHC 32.6 30.0 - 36.5 g/dL    RDW 12.9 11.5 - 14.5 %    PLATELET 727 298 - 485 K/uL    MPV 11.9 8.9 - 12.9 FL    NRBC 0.0 0  WBC    ABSOLUTE NRBC 0.00 0.00 - 0.01 K/uL    NEUTROPHILS 73 32 - 75 %    LYMPHOCYTES 13 12 - 49 %    MONOCYTES 12 5 - 13 %    EOSINOPHILS 0 0 - 7 %    BASOPHILS 1 0 - 1 %    IMMATURE GRANULOCYTES 1 (H) 0.0 - 0.5 %    ABS. NEUTROPHILS 9.7 (H) 1.8 - 8.0 K/UL    ABS. LYMPHOCYTES 1.6 0.8 - 3.5 K/UL    ABS. MONOCYTES 1.5 (H) 0.0 - 1.0 K/UL    ABS. EOSINOPHILS 0.0 0.0 - 0.4 K/UL    ABS. BASOPHILS 0.1 0.0 - 0.1 K/UL    ABS. IMM.  GRANS. 0.1 (H) 0.00 - 0.04 K/UL    DF AUTOMATED     GLUCOSE, POC    Collection Time: 11/03/20  8:32 AM   Result Value Ref Range    Glucose (POC) 153 (H) 65 - 100 mg/dL    Performed by [employer] P    POC EG7    Collection Time: 11/03/20 10:00 AM   Result Value Ref Range    Calcium, ionized (POC) 1.18 1.12 - 1.32 mmol/L    FIO2 (POC) 21 %    pH (POC) 7.49 (H) 7.35 - 7.45      pCO2 (POC) 37.7 35.0 - 45.0 MMHG    pO2 (POC) 57 (L) 80 - 100 MMHG    HCO3 (POC) 28.8 (H) 22 - 26 MMOL/L    Base excess (POC) 6 mmol/L    sO2 (POC) 91 (L) 92 - 97 %    Site RIGHT RADIAL      Device: ROOM AIR      Allens test (POC) YES      Specimen type (POC) ARTERIAL      Total resp. rate 12     SARS-COV-2    Collection Time: 11/03/20 10:21 AM   Result Value Ref Range    Specimen source Nasopharyngeal      SARS-CoV-2 PENDING     SARS-CoV-2 PENDING     Specimen source Nasopharyngeal      COVID-19 rapid test PENDING     Specimen type NP Swab      Health status PENDING     COVID-19 PENDING    GLUCOSE, POC    Collection Time: 11/03/20 11:40 AM   Result Value Ref Range    Glucose (POC) 158 (H) 65 - 100 mg/dL    Performed by [employer] P    GLUCOSE, POC    Collection Time: 11/03/20  5:19 PM   Result Value Ref Range    Glucose (POC) 146 (H) 65 - 100 mg/dL    Performed by Cl Tinsley    #1 fever    #2 ankle tenderness    #3 groundglass opacities on CT    #4 diabetes    #5 hypertension    #6 dyslipidemia    PLAN    The patient continues to have fever. His main symptom is tenderness and warmth on bilateral ankles. He tells me that this happens once in a while And it has been happening for a long time now. I will order an WILLIAM, rheumatoid factor and CCP antibody tomorrow. I will also order uric acid. He may benefit from Ortho consult. As for the CT findings in the lungs, I will order a respiratory viral PCR. I will also order Legionella and streptococcal pneumoniae urinary antigen.   I will DC vancomycin and place him kayce Horton.                       ___________________________________________________  ID: Ewelina López MD

## 2020-11-03 NOTE — PROGRESS NOTES
TRANSITION OF CARE PLAN   RUR 9% LOW RISK    Received call from attending RN regarding patient's disposition/ discharge plan. Per report, Patient's family requested transfer to hospital in [place], [place] for ongoing medical treatment. Hospital location has yet to be identified. Patient resides in [place], [place] with spouse, Myra Tellez. Called patient's spouse, Myra Tellez [phone] and son, Aleyda Haney [phone] to discuss disposition. No answer. Unable to leave voice message. Notified MD to discuss plan. Patient may not qualify as it isn't medically necessary. Attending MD will determine medical necessary for transfer. Notified RN. 16:01P: CM called patient's son, Aleyda Haney [phone] again. Phone ringing busy. Unable to leave VM. CM to follow.      Jack Lafleur, MSW,CRM

## 2020-11-03 NOTE — PROGRESS NOTES
Admission Medication Reconciliation:    Information obtained from:  Medication bottles brought in by family  RxQuery data available¹:  NO    Comments/Recommendations: Updated PTA meds/reviewed patient's allergies. 1)  Med list updated using medications brought in by family (unable to interview patient due to AMS)    2)  Medication changes (since last review): Added  - starlix 120 mg TID    Removed  - Janumet XR         ¹RxQuery pharmacy benefit data reflects medications filled and processed through the patient's insurance, however   this data does NOT capture whether the medication was picked up or is currently being taken by the patient. Allergies:  Patient has no known allergies. Significant PMH/Disease States:   Past Medical History:   Diagnosis Date    Diabetes [facility])      Chief Complaint for this Admission:    Chief Complaint   Patient presents with    Altered mental status     Prior to Admission Medications:   Prior to Admission Medications   Prescriptions Last Dose Informant Taking? amLODIPine-Olmesartan 5-40 mg tab   Yes   Sig: Take 1 Tab by mouth daily. chlorthalidone (HYGROTON) 25 mg tablet   Yes   Sig: Take 25 mg by mouth daily. nateglinide (STARLIX) 120 mg tablet   Yes   Sig: Take 120 mg by mouth Before breakfast, lunch, and dinner. rosuvastatin (CRESTOR) 10 mg tablet   Yes   Sig: Take 10 mg by mouth nightly. Facility-Administered Medications: None       Please contact the main inpatient pharmacy with any questions or concerns at [phone] and we will direct you to the clinical pharmacist covering this patient's care while in-house.    Cornelio Rm, LOVED

## 2020-11-03 NOTE — PROGRESS NOTES
Chart reviewed and patient is currently being ruled out for COVID-19. In attempts to have only essential personnel enter the room and conserve PPE, we will confer with nursing and/or the referring provider to determine the most appropriate timing of our therapy intervention. We are following the patient peripherally to support nursing staff on his care plan. Thank you for your assistance.

## 2020-11-03 NOTE — PROGRESS NOTES
[address] [facility] Adult  Hospitalist Group                                                                                          Hospitalist Progress Note  Yesenia Medina MD  Answering service: [phone] from in house phone        Date of Service:  11/3/2020  NAME:  Meghan Delaney  :  1950  MRN:  [id number]      Admission Summary: This is a 82-year-old man with a past medical history significant for type 2 diabetes, hypertension, dyslipidemia, was in his usual state of health until the day of his presentation at the emergency room when the patient developed a change in mental status. The patient reported to his dentist's office and underwent a number of tooth extractions. The procedure finishing at about 10:00 a.m. The patient developed a change in mental status after the procedure. He was monitored at the dentist's office without any improvement in his mental status. The patient was at baseline before the procedure according to report. The patient was also found to be hypoxic with oxygen saturation of 88%. The oxygen saturation improved with supplemental oxygen. Because of the persistent change in mental status, the patient was sent to the emergency room at the [facility] in [place]. The patient has no history of congestive heart failure or respiratory disease. There was no sick contact or contact with any person with COVID-19 virus infection. When the patient arrived at the emergency room, the patient was found to have a fever of 101.9, significant leukocytosis, and elevated lactic acid level. Lumbar puncture was performed. The patient received antibiotics and was referred to the hospitalist service for evaluation for admission. CT scan of the head done on arrival at the emergency room did not show any acute intracranial abnormalities.   No record of prior admission to this hospital.       Interval history / Subjective:    pt seen and examined AMS resolved his CT scans are reviewed with him stable oxygen saturation  Pt keep spiking fever / cultures NGTD   Repeat COVID Test today ID consult      Assessment & Plan:     1. Acute delirium. metabolic encephalopathy resolved  - opiates related ? From dental procedure drug screen positive   - CT head negative     2. Acute respiratory failure with hypoxia. resolved   - pt seen breathing normally in RA while examined  - CT chest reviewed     3. Type 2 diabetes with hyperglycemia. Cont SSI Recheck hemoglobin A1c level. We will hold oral agents until the time of discharge from the hospital.    4.  Sepsis. Unclear etiology may be mandibular abscess seen in CT maxillofacial   -  The diagnosis of sepsis is supported by elevated lactic acid level, fever, and significant leukocytosis. - Patient on vancomycin and Zosyn. LP shows normal CSF  - All CT reviewed  Still having fever   - COVID test ordered and ID consulted     5. Hypertension. We will hold antihypertensive medication in the setting of sepsis with potential for hypotension. 6.  Dyslipidemia. We will resume preadmission medication. 7. Hypokalemia - replete and repeat lab     Code status: Full  DVT prophylaxis: scd    Care Plan discussed with: Patient/Family and Nurse  Anticipated Disposition: Home w/Family  Anticipated Discharge: 24 hours to 48 hours   Called dental surgeon left [facility] Problems  Date Reviewed: 10/31/2020          Codes Class Noted POA    Encephalopathy ICD-10-CM: G93.40  ICD-9-CM: 348.30  11/1/2020 Unknown        * (Principal) Acute delirium ICD-10-CM: R41.0  ICD-9-CM: 780.09  10/30/2020 Yes                Review of Systems:   A comprehensive review of systems was negative. Xr Chest Sngl V    Result Date: 10/30/2020  IMPRESSION: Mild central vascular fullness and interstitial prominence. Poor inspiratory effort. No focal infiltrate. .  . Ct Head Wo Cont    Result Date: 10/30/2020  IMPRESSION: 1.  No acute intracranial abnormality. 2. Microvascular ischemic, possible old ischemic and age-related change greater than expected for stated age. 3. Mild sinusitis. Ct Maxillofacial Wo Cont    Result Date: 11/1/2020  IMPRESSION: No fracture. Possible small right mandibular periapical dental abscesses. Cta Chest W Or W Wo Cont    Result Date: 11/1/2020  IMPRESSION: 1. No pulmonary emboli. 2. Groundglass airspace disease, nonspecific. Ct Abd Pelv W Cont    Result Date: 11/1/2020  IMPRESSION: No Acute Disease. Vital Signs:    Last 24hrs VS reviewed since prior progress note. Most recent are:  Visit Vitals  BP (!) 166/75 (BP 1 Location: Right arm, BP Patient Position: At rest)   Pulse 76   Temp 99.2 °F (37.3 °C)   Resp 23   Ht 6' 2\" (1.88 m)   Wt 119 kg (262 lb 5.6 oz)   SpO2 99%   BMI 33.68 kg/m²         Intake/Output Summary (Last 24 hours) at 11/3/2020 1027  Last data filed at 11/2/2020 1907  Gross per 24 hour   Intake 1185.83 ml   Output 550 ml   Net 635.83 ml        Physical Examination:             Constitutional:  No acute distress, cooperative, pleasant    ENT:  Oral mucosa moist, oropharynx benign. Resp:  CTA bilaterally. No wheezing/rhonchi/rales. No accessory muscle use   CV:  Regular rhythm, normal rate, no murmurs, gallops, rubs    GI:  Soft, non distended, non tender. normoactive bowel sounds, no hepatosplenomegaly     Musculoskeletal:  No edema, warm, 2+ pulses throughout    Neurologic:  Moves all extremities. AAOx3, CN II-XII reviewed     Psych:  Good insight, Not anxious nor agitated.        Data Review:    I personally reviewed  Image and labs      Labs:     Recent Labs     11/03/20 0412 11/02/20  0958   WBC 13.0* 12.3*   HGB 10.4* 10.5*   HCT 31.9* 32.2*    190     Recent Labs     11/03/20 0412 11/02/20  0422 11/01/20  0414   * 138 137   K 3.2* 3.9 3.0*   CL 96* 103 103   CO2 27 22 32   BUN 7 8 7   CREA 1.03 0.93 1.01   * 103* 147*   CA 9.2 8.4* 8.7     No results for input(s): ALT, AP, TBIL, TBILI, TP, ALB, GLOB, GGT, AML, LPSE in the last 72 hours. No lab exists for component: SGOT, GPT, AMYP, HLPSE  No results for input(s): INR, PTP, APTT, INREXT, INREXT in the last 72 hours. No results for input(s): FE, TIBC, PSAT, FERR in the last 72 hours. No results found for: FOL, RBCF   No results for input(s): PH, PCO2, PO2 in the last 72 hours. No results for input(s): CPK, CKNDX, TROIQ in the last 72 hours.     No lab exists for component: CPKMB  No results found for: CHOL, CHOLX, CHLST, CHOLV, HDL, HDLP, LDL, LDLC, DLDLP, TGLX, TRIGL, TRIGP, CHHD, CHHDX  Lab Results   Component Value Date/Time    Glucose (POC) 153 (H) 11/03/2020 08:32 AM    Glucose (POC) 238 (H) 11/02/2020 09:02 PM    Glucose (POC) 168 (H) 11/02/2020 05:04 PM    Glucose (POC) 141 (H) 11/02/2020 12:19 PM    Glucose (POC) 151 (H) 11/02/2020 08:19 AM     Lab Results   Component Value Date/Time    Color YELLOW/STRAW 10/31/2020 03:16 AM    Appearance CLEAR 10/31/2020 03:16 AM    Specific gravity 1.030 10/31/2020 03:16 AM    Specific gravity PATIENT DISCHARGED BEFORE SAMPLE COLLECTED 10/30/2020 04:15 PM    pH (UA) 5.0 10/31/2020 03:16 AM    Protein Negative 10/31/2020 03:16 AM    Glucose Negative 10/31/2020 03:16 AM    Ketone Negative 10/31/2020 03:16 AM    Bilirubin Negative 10/31/2020 03:16 AM    Urobilinogen 0.2 10/31/2020 03:16 AM    Nitrites Negative 10/31/2020 03:16 AM    Leukocyte Esterase Negative 10/31/2020 03:16 AM    Epithelial cells PATIENT DISCHARGED BEFORE SAMPLE COLLECTED 10/30/2020 04:15 PM    Bacteria PATIENT DISCHARGED BEFORE SAMPLE COLLECTED 10/30/2020 04:15 PM    WBC PATIENT DISCHARGED BEFORE SAMPLE COLLECTED 10/30/2020 04:15 PM    RBC PATIENT DISCHARGED BEFORE SAMPLE COLLECTED 10/30/2020 04:15 PM         Medications Reviewed:     Current Facility-Administered Medications   Medication Dose Route Frequency    vancomycin (VANCOCIN) 1750 mg in  ml infusion  1,750 mg IntraVENous Q12H    sodium chloride (NS) flush 5-10 mL  5-10 mL IntraVENous PRN    sodium chloride (NS) flush 5-40 mL  5-40 mL IntraVENous Q8H    sodium chloride (NS) flush 5-40 mL  5-40 mL IntraVENous PRN    acetaminophen (TYLENOL) tablet 650 mg  650 mg Oral Q6H PRN    Or    acetaminophen (TYLENOL) suppository 650 mg  650 mg Rectal Q6H PRN    polyethylene glycol (MIRALAX) packet 17 g  17 g Oral DAILY PRN    promethazine (PHENERGAN) tablet 12.5 mg  12.5 mg Oral Q6H PRN    Or    ondansetron (ZOFRAN) injection 4 mg  4 mg IntraVENous Q6H PRN    piperacillin-tazobactam (ZOSYN) 3.375 g in 0.9% sodium chloride (MBP/ADV) 100 mL  3.375 g IntraVENous Q8H    insulin lispro (HUMALOG) injection   SubCUTAneous AC&HS    glucose chewable tablet 16 g  4 Tab Oral PRN    glucagon (GLUCAGEN) injection 1 mg  1 mg IntraMUSCular PRN    rosuvastatin (CRESTOR) tablet 10 mg  10 mg Oral QHS    vancomycin dosing per pharmacy   Other Rx Dosing/Monitoring    dextrose 10 % infusion 125-250 mL  125-250 mL IntraVENous PRN    lactated Ringers infusion  50 mL/hr IntraVENous CONTINUOUS     ______________________________________________________________________  EXPECTED LENGTH OF STAY: 4d 19h  ACTUAL LENGTH OF STAY:          2                 Delia Pennington MD

## 2020-11-03 NOTE — PROGRESS NOTES
Problem: Breathing Pattern - Ineffective  Goal: *Absence of hypoxia  Outcome: Progressing Towards Goal  Pt on room air but has loud snoring. Pt asked if he is having difficultly  breathing. He said no. O2 sats remain above 96%. Problem: Pressure Injury - Risk of  Goal: *Prevention of pressure injury  Description: Document Sulaiman Scale and appropriate interventions in the flowsheet. Outcome: Progressing Towards Goal  Patient turned every two hours, moisture barrier applied, heels elevated, pillows and blankets used, pressure redistributing mattress , linens dry. Problem: Falls - Risk of  Goal: *Absence of Falls  Description: Document John Evans Fall Risk and appropriate interventions in the flowsheet. Outcome: Progressing Towards Goal  Note: Fall Risk Interventions:  Pt remains free of falls during admission. Call bell and frequently used items within reach. Bedside table within reach. Pt provided nonskid socks and instructed to call out for nurse when in need of assistance. Bedside shift change report given to Pat (oncoming nurse) by Sandoval Cuadra (offgoing nurse).  Report included the following information SBAR, Intake/Output, MAR, Recent Results, and Cardiac Rhythm SR .

## 2020-11-03 NOTE — PROGRESS NOTES
1546: Patient febrile 102.9, increased confusion and drowsy, PRN tylenol administered. Dr. Rg Lee notified orders received for blood culture, and ID consult. Bedside shift change report given to Venkata Truong (oncoming nurse) by Kimberly Huizar (offgoing nurse). Report included the following information SBAR, Kardex, Intake/Output, Recent Results and Cardiac Rhythm SR. Problem: Risk for Spread of Infection  Goal: Prevent transmission of infectious organism to others  Description: Prevent the transmission of infectious organisms to other patients, staff members, and visitors. Outcome: Progressing Towards Goal     Problem: Falls - Risk of  Goal: *Absence of Falls  Description: Document Sheila Gibson Fall Risk and appropriate interventions in the flowsheet.   Outcome: Progressing Towards Goal  Note: Fall Risk Interventions:  Mobility Interventions: Assess mobility with egress test, Communicate number of staff needed for ambulation/transfer, OT consult for ADLs, PT Consult for mobility concerns, PT Consult for assist device competence, Strengthening exercises (ROM-active/passive), Patient to call before getting OOB, Utilize walker, cane, or other assistive device, Utilize gait belt for transfers/ambulation    Mentation Interventions: Adequate sleep, hydration, pain control, Door open when patient unattended, Evaluate medications/consider consulting pharmacy, Gait belt with transfers/ambulation, Increase mobility, More frequent rounding, Reorient patient, Room close to nurse's station, Self-releasing belt, Toileting rounds, Update white board    Medication Interventions: Evaluate medications/consider consulting pharmacy, Patient to call before getting OOB, Teach patient to arise slowly, Utilize gait belt for transfers/ambulation    Elimination Interventions: Call light in reach, Patient to call for help with toileting needs, Elevated toilet seat, Stay With Me (per policy), Toilet paper/wipes in reach, Toileting schedule/hourly rounds              Problem: Pressure Injury - Risk of  Goal: *Prevention of pressure injury  Description: Document Sulaiman Scale and appropriate interventions in the flowsheet. Outcome: Progressing Towards Goal  Note: Pressure Injury Interventions:       Moisture Interventions: Assess need for specialty bed, Check for incontinence Q2 hours and as needed, Apply protective barrier, creams and emollients, Absorbent underpads, Internal/External urinary devices, Minimize layers, Limit adult briefs, Maintain skin hydration (lotion/cream), Moisture barrier, Offer toileting Q_hr    Activity Interventions: Assess need for specialty bed, Increase time out of bed, Chair cushion, PT/OT evaluation    Mobility Interventions: Assess need for specialty bed, Float heels, Chair cushion, HOB 30 degrees or less, PT/OT evaluation, Turn and reposition approx. every two hours(pillow and wedges)    Nutrition Interventions: Document food/fluid/supplement intake, Discuss nutritional consult with provider, Offer support with meals,snacks and hydration    Friction and Shear Interventions: Apply protective barrier, creams and emollients, Foam dressings/transparent film/skin sealants, Feet elevated on foot rest, HOB 30 degrees or less, Lift sheet, Lift team/patient mobility team, Minimize layers, Sit at 90-degree angle, Transfer aides:transfer board/Pa lift/ceiling lift, Transferring/repositioning devices          Problem: Breathing Pattern - Ineffective  Goal: *Absence of hypoxia  Outcome: Progressing Towards Goal  Note: Patient on room air, SPO2 greater than 92%     Problem: Sepsis: Day 3  Goal: Off Pathway (Use only if patient is Off Pathway)  Outcome: Progressing Towards Goal  Note: Patient febrile, on IV zosyn, PRN tylenol administered.

## 2020-11-03 NOTE — PROGRESS NOTES
0800: Patient drowsy and altered mental status, alert to self only. Dr. Johanne Asencio aware, orders received for ABG, retest for COVID, urine culture. 1200: Patient more alert, alert to self, place, situation. Poor appetite, encouraging PO intake. Bedside shift change report given to Venkata Truong (oncoming nurse) by Luis Fernando Soto (offgoing nurse). Report included the following information SBAR, Kardex, Intake/Output, MAR, Recent Results and Cardiac Rhythm SR. Problem: Risk for Spread of Infection  Goal: Prevent transmission of infectious organism to others  Description: Prevent the transmission of infectious organisms to other patients, staff members, and visitors. Outcome: Progressing Towards Goal  Note: Patient being retested for COVID, droplet plus precautions maintained. Problem: Falls - Risk of  Goal: *Absence of Falls  Description: Document Murali Pearsonccasin Fall Risk and appropriate interventions in the flowsheet.   Outcome: Progressing Towards Goal  Note: Fall Risk Interventions:  Mobility Interventions: Assess mobility with egress test, Communicate number of staff needed for ambulation/transfer, Patient to call before getting OOB, OT consult for ADLs, PT Consult for mobility concerns, PT Consult for assist device competence, Strengthening exercises (ROM-active/passive), Utilize walker, cane, or other assistive device, Utilize gait belt for transfers/ambulation    Mentation Interventions: Door open when patient unattended, Evaluate medications/consider consulting pharmacy, Gait belt with transfers/ambulation, HELP ([address]) if available, Increase mobility, More frequent rounding, Reorient patient, Room close to nurse's station, Self-releasing belt, Toileting rounds, Update white board    Medication Interventions: Evaluate medications/consider consulting pharmacy, Patient to call before getting OOB, Teach patient to arise slowly, Utilize gait belt for transfers/ambulation    Elimination Interventions: Call light in reach, Patient to call for help with toileting needs, Stay With Me (per policy), Elevated toilet seat, Toileting schedule/hourly rounds, Urinal in reach, Toilet paper/wipes in reach              Problem: Pressure Injury - Risk of  Goal: *Prevention of pressure injury  Description: Document Sulaiman Scale and appropriate interventions in the flowsheet. Outcome: Progressing Towards Goal  Note: Pressure Injury Interventions:  Sensory Interventions: Assess changes in LOC, Assess need for specialty bed, Avoid rigorous massage over bony prominences, Chair cushion, Check visual cues for pain, Discuss PT/OT consult with provider, Float heels, Keep linens dry and wrinkle-free, Minimize linen layers, Monitor skin under medical devices, Maintain/enhance activity level    Moisture Interventions: Absorbent underpads, Assess need for specialty bed, Check for incontinence Q2 hours and as needed, Apply protective barrier, creams and emollients, Internal/External urinary devices, Limit adult briefs, Maintain skin hydration (lotion/cream), Minimize layers, Moisture barrier, Offer toileting Q_hr    Activity Interventions: Assess need for specialty bed, Chair cushion, Increase time out of bed, PT/OT evaluation    Mobility Interventions: Assess need for specialty bed, Float heels, Chair cushion, HOB 30 degrees or less, PT/OT evaluation, Turn and reposition approx.  every two hours(pillow and wedges)    Nutrition Interventions: Document food/fluid/supplement intake, Discuss nutritional consult with provider, Offer support with meals,snacks and hydration    Friction and Shear Interventions: Apply protective barrier, creams and emollients, Foam dressings/transparent film/skin sealants, HOB 30 degrees or less, Lift sheet, Feet elevated on foot rest, Minimize layers, Sit at 90-degree angle, Lift team/patient mobility team, Transfer aides:transfer board/Pa lift/ceiling lift         Problem: Sepsis: Day 3  Goal: Consults, if ordered  Outcome: Progressing Towards Goal  Note:  Patient has altered mentation, ID consult ordered

## 2020-11-03 NOTE — PROGRESS NOTES
Consult received, chart reviewed and note high fever since admission. Pt is now on droplet plus precautions and is being retested for COVID-19.  PT will defer eval at this time, follow and see as appropriate for the eval. Thank you, Dylan Herr, PT

## 2020-11-04 ENCOUNTER — APPOINTMENT (OUTPATIENT)
Dept: GENERAL RADIOLOGY | Age: 70
DRG: 871 | End: 2020-11-04
Attending: PHYSICIAN ASSISTANT
Payer: MEDICARE

## 2020-11-04 LAB
ANION GAP SERPL CALC-SCNC: 9 MMOL/L (ref 5–15)
B PERT DNA SPEC QL NAA+PROBE: NOT DETECTED
BACTERIA SPEC CULT: NORMAL
BACTERIA SPEC CULT: NORMAL
BASOPHILS # BLD: 0 K/UL (ref 0–0.1)
BASOPHILS NFR BLD: 0 % (ref 0–1)
BORDETELLA PARAPERTUSSIS PCR, BORPAR: NOT DETECTED
BUN SERPL-MCNC: 8 MG/DL (ref 6–20)
BUN/CREAT SERPL: 8 (ref 12–20)
C PNEUM DNA SPEC QL NAA+PROBE: NOT DETECTED
CALCIUM SERPL-MCNC: 9.1 MG/DL (ref 8.5–10.1)
CHLORIDE SERPL-SCNC: 96 MMOL/L (ref 97–108)
CO2 SERPL-SCNC: 28 MMOL/L (ref 21–32)
CREAT SERPL-MCNC: 1.03 MG/DL (ref 0.7–1.3)
DIFFERENTIAL METHOD BLD: ABNORMAL
EOSINOPHIL # BLD: 0 K/UL (ref 0–0.4)
EOSINOPHIL NFR BLD: 0 % (ref 0–7)
ERYTHROCYTE [DISTWIDTH] IN BLOOD BY AUTOMATED COUNT: 13 % (ref 11.5–14.5)
FLUAV H1 2009 PAND RNA SPEC QL NAA+PROBE: NOT DETECTED
FLUAV H1 RNA SPEC QL NAA+PROBE: NOT DETECTED
FLUAV H3 RNA SPEC QL NAA+PROBE: NOT DETECTED
FLUAV SUBTYP SPEC NAA+PROBE: NOT DETECTED
FLUBV RNA SPEC QL NAA+PROBE: NOT DETECTED
GLUCOSE BLD STRIP.AUTO-MCNC: 152 MG/DL (ref 65–100)
GLUCOSE BLD STRIP.AUTO-MCNC: 156 MG/DL (ref 65–100)
GLUCOSE BLD STRIP.AUTO-MCNC: 166 MG/DL (ref 65–100)
GLUCOSE BLD STRIP.AUTO-MCNC: 168 MG/DL (ref 65–100)
GLUCOSE SERPL-MCNC: 142 MG/DL (ref 65–100)
HADV DNA SPEC QL NAA+PROBE: NOT DETECTED
HCOV 229E RNA SPEC QL NAA+PROBE: NOT DETECTED
HCOV HKU1 RNA SPEC QL NAA+PROBE: NOT DETECTED
HCOV NL63 RNA SPEC QL NAA+PROBE: NOT DETECTED
HCOV OC43 RNA SPEC QL NAA+PROBE: NOT DETECTED
HCT VFR BLD AUTO: 31.4 % (ref 36.6–50.3)
HEALTH STATUS, XMCV2T: NORMAL
HGB BLD-MCNC: 10.2 G/DL (ref 12.1–17)
HMPV RNA SPEC QL NAA+PROBE: NOT DETECTED
HPIV1 RNA SPEC QL NAA+PROBE: NOT DETECTED
HPIV2 RNA SPEC QL NAA+PROBE: NOT DETECTED
HPIV3 RNA SPEC QL NAA+PROBE: NOT DETECTED
HPIV4 RNA SPEC QL NAA+PROBE: NOT DETECTED
IMM GRANULOCYTES # BLD AUTO: 0.1 K/UL (ref 0–0.04)
IMM GRANULOCYTES NFR BLD AUTO: 1 % (ref 0–0.5)
L PNEUMO1 AG UR QL IA: NEGATIVE
LYMPHOCYTES # BLD: 1.3 K/UL (ref 0.8–3.5)
LYMPHOCYTES NFR BLD: 8 % (ref 12–49)
M PNEUMO DNA SPEC QL NAA+PROBE: NOT DETECTED
MCH RBC QN AUTO: 28.3 PG (ref 26–34)
MCHC RBC AUTO-ENTMCNC: 32.5 G/DL (ref 30–36.5)
MCV RBC AUTO: 87 FL (ref 80–99)
MONOCYTES # BLD: 1.6 K/UL (ref 0–1)
MONOCYTES NFR BLD: 10 % (ref 5–13)
NEUTS SEG # BLD: 12.7 K/UL (ref 1.8–8)
NEUTS SEG NFR BLD: 81 % (ref 32–75)
NRBC # BLD: 0 K/UL (ref 0–0.01)
NRBC BLD-RTO: 0 PER 100 WBC
PLATELET # BLD AUTO: 249 K/UL (ref 150–400)
PMV BLD AUTO: 11.2 FL (ref 8.9–12.9)
POTASSIUM SERPL-SCNC: 3.3 MMOL/L (ref 3.5–5.1)
RBC # BLD AUTO: 3.61 M/UL (ref 4.1–5.7)
RHEUMATOID FACT SERPL-ACNC: <10 IU/ML
RSV RNA SPEC QL NAA+PROBE: NOT DETECTED
RV+EV RNA SPEC QL NAA+PROBE: NOT DETECTED
SARS-COV-2, COV2: NOT DETECTED
SERVICE CMNT-IMP: ABNORMAL
SERVICE CMNT-IMP: NORMAL
SERVICE CMNT-IMP: NORMAL
SODIUM SERPL-SCNC: 133 MMOL/L (ref 136–145)
SOURCE, COVRS: NORMAL
SPECIMEN SOURCE, FCOV2M: NORMAL
SPECIMEN SOURCE: NORMAL
SPECIMEN TYPE, XMCV1T: NORMAL
URATE SERPL-MCNC: 3.9 MG/DL (ref 3.5–7.2)
WBC # BLD AUTO: 15.8 K/UL (ref 4.1–11.1)

## 2020-11-04 PROCEDURE — 86200 CCP ANTIBODY: CPT

## 2020-11-04 PROCEDURE — 97530 THERAPEUTIC ACTIVITIES: CPT

## 2020-11-04 PROCEDURE — 82962 GLUCOSE BLOOD TEST: CPT

## 2020-11-04 PROCEDURE — 94660 CPAP INITIATION&MGMT: CPT

## 2020-11-04 PROCEDURE — 84550 ASSAY OF BLOOD/URIC ACID: CPT

## 2020-11-04 PROCEDURE — 80048 BASIC METABOLIC PNL TOTAL CA: CPT

## 2020-11-04 PROCEDURE — 74011250636 HC RX REV CODE- 250/636: Performed by: INTERNAL MEDICINE

## 2020-11-04 PROCEDURE — 97535 SELF CARE MNGMENT TRAINING: CPT

## 2020-11-04 PROCEDURE — 86431 RHEUMATOID FACTOR QUANT: CPT

## 2020-11-04 PROCEDURE — 5A09357 ASSISTANCE WITH RESPIRATORY VENTILATION, LESS THAN 24 CONSECUTIVE HOURS, CONTINUOUS POSITIVE AIRWAY PRESSURE: ICD-10-PCS | Performed by: HOSPITALIST

## 2020-11-04 PROCEDURE — 97162 PT EVAL MOD COMPLEX 30 MIN: CPT

## 2020-11-04 PROCEDURE — 74011250637 HC RX REV CODE- 250/637: Performed by: HOSPITALIST

## 2020-11-04 PROCEDURE — 009U3ZX DRAINAGE OF SPINAL CANAL, PERCUTANEOUS APPROACH, DIAGNOSTIC: ICD-10-PCS | Performed by: EMERGENCY MEDICINE

## 2020-11-04 PROCEDURE — 73600 X-RAY EXAM OF ANKLE: CPT

## 2020-11-04 PROCEDURE — 86713 LEGIONELLA ANTIBODY: CPT

## 2020-11-04 PROCEDURE — 85025 COMPLETE CBC W/AUTO DIFF WBC: CPT

## 2020-11-04 PROCEDURE — 74011000250 HC RX REV CODE- 250: Performed by: PHYSICIAN ASSISTANT

## 2020-11-04 PROCEDURE — 65660000000 HC RM CCU STEPDOWN

## 2020-11-04 PROCEDURE — 74011636637 HC RX REV CODE- 636/637: Performed by: INTERNAL MEDICINE

## 2020-11-04 PROCEDURE — 86038 ANTINUCLEAR ANTIBODIES: CPT

## 2020-11-04 PROCEDURE — 36415 COLL VENOUS BLD VENIPUNCTURE: CPT

## 2020-11-04 PROCEDURE — 97165 OT EVAL LOW COMPLEX 30 MIN: CPT

## 2020-11-04 PROCEDURE — 86738 MYCOPLASMA ANTIBODY: CPT

## 2020-11-04 PROCEDURE — 74011000250 HC RX REV CODE- 250

## 2020-11-04 PROCEDURE — 0SJG3ZZ INSPECTION OF LEFT ANKLE JOINT, PERCUTANEOUS APPROACH: ICD-10-PCS | Performed by: ORTHOPAEDIC SURGERY

## 2020-11-04 PROCEDURE — 20611 DRAIN/INJ JOINT/BURSA W/US: CPT

## 2020-11-04 PROCEDURE — 74011000258 HC RX REV CODE- 258: Performed by: INTERNAL MEDICINE

## 2020-11-04 PROCEDURE — 74011250637 HC RX REV CODE- 250/637: Performed by: INTERNAL MEDICINE

## 2020-11-04 RX ORDER — LIDOCAINE HYDROCHLORIDE AND EPINEPHRINE 10; 10 MG/ML; UG/ML
10 INJECTION, SOLUTION INFILTRATION; PERINEURAL ONCE
Status: DISCONTINUED | OUTPATIENT
Start: 2020-11-04 | End: 2020-11-04

## 2020-11-04 RX ORDER — LIDOCAINE HYDROCHLORIDE 10 MG/ML
INJECTION, SOLUTION EPIDURAL; INFILTRATION; INTRACAUDAL; PERINEURAL
Status: COMPLETED
Start: 2020-11-04 | End: 2020-11-04

## 2020-11-04 RX ORDER — POTASSIUM CHLORIDE 750 MG/1
40 TABLET, FILM COATED, EXTENDED RELEASE ORAL 2 TIMES DAILY
Status: COMPLETED | OUTPATIENT
Start: 2020-11-04 | End: 2020-11-04

## 2020-11-04 RX ORDER — LIDOCAINE HYDROCHLORIDE 10 MG/ML
50 INJECTION INFILTRATION; PERINEURAL ONCE
Status: COMPLETED | OUTPATIENT
Start: 2020-11-04 | End: 2020-11-04

## 2020-11-04 RX ADMIN — INSULIN LISPRO 2 UNITS: 100 INJECTION, SOLUTION INTRAVENOUS; SUBCUTANEOUS at 16:18

## 2020-11-04 RX ADMIN — ACETAMINOPHEN 650 MG: 325 TABLET ORAL at 13:52

## 2020-11-04 RX ADMIN — AMLODIPINE BESYLATE 5 MG: 5 TABLET ORAL at 09:25

## 2020-11-04 RX ADMIN — ROSUVASTATIN CALCIUM 10 MG: 10 TABLET, FILM COATED ORAL at 21:00

## 2020-11-04 RX ADMIN — LIDOCAINE HYDROCHLORIDE 5 ML: 10 INJECTION, SOLUTION EPIDURAL; INFILTRATION; INTRACAUDAL; PERINEURAL at 15:45

## 2020-11-04 RX ADMIN — PIPERACILLIN AND TAZOBACTAM 3.38 G: 3; .375 INJECTION, POWDER, LYOPHILIZED, FOR SOLUTION INTRAVENOUS at 06:58

## 2020-11-04 RX ADMIN — Medication 10 ML: at 05:00

## 2020-11-04 RX ADMIN — POTASSIUM CHLORIDE 40 MEQ: 750 TABLET, FILM COATED, EXTENDED RELEASE ORAL at 13:33

## 2020-11-04 RX ADMIN — ACETAMINOPHEN 650 MG: 325 TABLET ORAL at 04:41

## 2020-11-04 RX ADMIN — POTASSIUM CHLORIDE 40 MEQ: 750 TABLET, FILM COATED, EXTENDED RELEASE ORAL at 17:00

## 2020-11-04 RX ADMIN — LEVOFLOXACIN 500 MG: 5 INJECTION, SOLUTION INTRAVENOUS at 18:00

## 2020-11-04 RX ADMIN — PIPERACILLIN AND TAZOBACTAM 3.38 G: 3; .375 INJECTION, POWDER, LYOPHILIZED, FOR SOLUTION INTRAVENOUS at 21:00

## 2020-11-04 RX ADMIN — Medication 10 ML: at 13:34

## 2020-11-04 RX ADMIN — LIDOCAINE HYDROCHLORIDE 5 ML: 10 INJECTION, SOLUTION EPIDURAL; INFILTRATION; INTRACAUDAL; PERINEURAL at 16:00

## 2020-11-04 RX ADMIN — LOSARTAN POTASSIUM 100 MG: 50 TABLET, FILM COATED ORAL at 09:25

## 2020-11-04 RX ADMIN — ACETAMINOPHEN 650 MG: 325 TABLET ORAL at 20:24

## 2020-11-04 RX ADMIN — INSULIN LISPRO 2 UNITS: 100 INJECTION, SOLUTION INTRAVENOUS; SUBCUTANEOUS at 13:34

## 2020-11-04 RX ADMIN — PIPERACILLIN AND TAZOBACTAM 3.38 G: 3; .375 INJECTION, POWDER, LYOPHILIZED, FOR SOLUTION INTRAVENOUS at 13:34

## 2020-11-04 RX ADMIN — INSULIN LISPRO 2 UNITS: 100 INJECTION, SOLUTION INTRAVENOUS; SUBCUTANEOUS at 09:24

## 2020-11-04 RX ADMIN — Medication 10 ML: at 21:00

## 2020-11-04 NOTE — PROGRESS NOTES
[address] [facility] Adult  Hospitalist Group                                                                                          Hospitalist Progress Note  Kristian Villalobos MD  Answering service: [phone] from in house phone        Date of Service:  2020  NAME:  Sophie Hawley  :  1950  MRN:  [id number]      Admission Summary: This is a 53-year-old man with a past medical history significant for type 2 diabetes, hypertension, dyslipidemia, was in his usual state of health until the day of his presentation at the emergency room when the patient developed a change in mental status. The patient reported to his dentist's office and underwent a number of tooth extractions. The procedure finishing at about 10:00 a.m. The patient developed a change in mental status after the procedure. He was monitored at the dentist's office without any improvement in his mental status. The patient was at baseline before the procedure according to report. The patient was also found to be hypoxic with oxygen saturation of 88%. The oxygen saturation improved with supplemental oxygen. Because of the persistent change in mental status, the patient was sent to the emergency room at the [facility] in [place]. The patient has no history of congestive heart failure or respiratory disease. There was no sick contact or contact with any person with COVID-19 virus infection. When the patient arrived at the emergency room, the patient was found to have a fever of 101.9, significant leukocytosis, and elevated lactic acid level. Lumbar puncture was performed. The patient received antibiotics and was referred to the hospitalist service for evaluation for admission. CT scan of the head done on arrival at the emergency room did not show any acute intracranial abnormalities.   No record of prior admission to this hospital.       Interval history / Subjective:    pt seen and examined AMS resolved he continues to spike fever labs reviewed discussed with ID may need Ortho consult to look at the swollen ankles discussed with patient's son over the phone  Second Covid test is still pending     Assessment & Plan:     1. Acute delirium. metabolic encephalopathy resolved   opiates related ? From dental procedure drug screen positive   - CT head negative   -Patient may have obstructive sleep apnea ABG showed some retention of CO2 placed on CPAP now more clear    2. Acute respiratory failure with hypoxia. resolved   - pt seen breathing normally in RA while examined  - CT chest reviewed     3. Type 2 diabetes with hyperglycemia. Cont SSI Recheck hemoglobin A1c level. We will hold oral agents until the time of discharge from the hospital.    4.  Sepsis. Unclear etiology may be mandibular abscess seen in CT maxillofacial   -  The diagnosis of sepsis is supported by elevated lactic acid level, fever, and significant leukocytosis. - Patient on vancomycin and Zosyn. LP shows normal CSF  - All CT reviewed  Still having fever ID consulted  - COVID test ordered and ID consulted     5. Hypertension. We will hold antihypertensive medication in the setting of sepsis with potential for hypotension. 6.  Dyslipidemia. We will resume preadmission medication. 7. Hypokalemia - replete and repeat lab     Code status: Full  DVT prophylaxis: scd    Care Plan discussed with: Patient/Family and Nurse  Anticipated Disposition: Home w/Family  Anticipated Discharge: 24 hours to 48 hours   Cussed with patient's son over the phone called dental surgeon and discussed the case as well     Hospital Problems  Date Reviewed: 10/31/2020          Codes Class Noted POA    Encephalopathy ICD-10-CM: G93.40  ICD-9-CM: 348.30  11/1/2020 Unknown        * (Principal) Acute delirium ICD-10-CM: R41.0  ICD-9-CM: 780.09  10/30/2020 Yes                Review of Systems:   A comprehensive review of systems was negative. Xr Chest Sngl V    Result Date: 10/30/2020  IMPRESSION: Mild central vascular fullness and interstitial prominence. Poor inspiratory effort. No focal infiltrate. .  . Ct Head Wo Cont    Result Date: 10/30/2020  IMPRESSION: 1. No acute intracranial abnormality. 2. Microvascular ischemic, possible old ischemic and age-related change greater than expected for stated age. 3. Mild sinusitis. Ct Maxillofacial Wo Cont    Result Date: 11/1/2020  IMPRESSION: No fracture. Possible small right mandibular periapical dental abscesses. Cta Chest W Or W Wo Cont    Result Date: 11/1/2020  IMPRESSION: 1. No pulmonary emboli. 2. Groundglass airspace disease, nonspecific. Ct Abd Pelv W Cont    Result Date: 11/1/2020  IMPRESSION: No Acute Disease. Vital Signs:    Last 24hrs VS reviewed since prior progress note. Most recent are:  Visit Vitals  /62   Pulse 73   Temp 98.2 °F (36.8 °C)   Resp 19   Ht 6' 2\" (1.88 m)   Wt 122 kg (268 lb 15.4 oz)   SpO2 100%   BMI 34.53 kg/m²         Intake/Output Summary (Last 24 hours) at 11/4/2020 1149  Last data filed at 11/3/2020 1912  Gross per 24 hour   Intake 910 ml   Output 1000 ml   Net -90 ml        Physical Examination:             Constitutional:  No acute distress, cooperative, pleasant    ENT:  Oral mucosa moist, oropharynx benign. Resp:  CTA bilaterally. No wheezing/rhonchi/rales. No accessory muscle use   CV:  Regular rhythm, normal rate, no murmurs, gallops, rubs    GI:  Soft, non distended, non tender. normoactive bowel sounds, no hepatosplenomegaly     Musculoskeletal:  No edema, warm, 2+ pulses throughout    Neurologic:  Moves all extremities. AAOx3, CN II-XII reviewed     Psych:  Good insight, Not anxious nor agitated.        Data Review:    I personally reviewed  Image and labs      Labs:     Recent Labs     11/04/20 0515 11/03/20 0412   WBC 15.8* 13.0*   HGB 10.2* 10.4*   HCT 31.4* 31.9*    219     Recent Labs     11/04/20 0515 11/03/20 0412 11/02/20  0422   * 132* 138   K 3.3* 3.2* 3.9   CL 96* 96* 103   CO2 28 27 22   BUN 8 7 8   CREA 1.03 1.03 0.93   * 160* 103*   CA 9.1 9.2 8.4*   URICA 3.9  --   --      No results for input(s): ALT, AP, TBIL, TBILI, TP, ALB, GLOB, GGT, AML, LPSE in the last 72 hours. No lab exists for component: SGOT, GPT, AMYP, HLPSE  No results for input(s): INR, PTP, APTT, INREXT, INREXT in the last 72 hours. No results for input(s): FE, TIBC, PSAT, FERR in the last 72 hours. No results found for: FOL, RBCF   No results for input(s): PH, PCO2, PO2 in the last 72 hours. No results for input(s): CPK, CKNDX, TROIQ in the last 72 hours.     No lab exists for component: CPKMB  No results found for: CHOL, CHOLX, CHLST, CHOLV, HDL, HDLP, LDL, LDLC, DLDLP, TGLX, TRIGL, TRIGP, CHHD, CHHDX  Lab Results   Component Value Date/Time    Glucose (POC) 152 (H) 11/04/2020 08:26 AM    Glucose (POC) 155 (H) 11/03/2020 09:17 PM    Glucose (POC) 146 (H) 11/03/2020 05:19 PM    Glucose (POC) 158 (H) 11/03/2020 11:40 AM    Glucose (POC) 153 (H) 11/03/2020 08:32 AM     Lab Results   Component Value Date/Time    Color YELLOW/STRAW 10/31/2020 03:16 AM    Appearance CLEAR 10/31/2020 03:16 AM    Specific gravity 1.030 10/31/2020 03:16 AM    Specific gravity PATIENT DISCHARGED BEFORE SAMPLE COLLECTED 10/30/2020 04:15 PM    pH (UA) 5.0 10/31/2020 03:16 AM    Protein Negative 10/31/2020 03:16 AM    Glucose Negative 10/31/2020 03:16 AM    Ketone Negative 10/31/2020 03:16 AM    Bilirubin Negative 10/31/2020 03:16 AM    Urobilinogen 0.2 10/31/2020 03:16 AM    Nitrites Negative 10/31/2020 03:16 AM    Leukocyte Esterase Negative 10/31/2020 03:16 AM    Epithelial cells PATIENT DISCHARGED BEFORE SAMPLE COLLECTED 10/30/2020 04:15 PM    Bacteria PATIENT DISCHARGED BEFORE SAMPLE COLLECTED 10/30/2020 04:15 PM    WBC PATIENT DISCHARGED BEFORE SAMPLE COLLECTED 10/30/2020 04:15 PM    RBC PATIENT DISCHARGED BEFORE SAMPLE COLLECTED 10/30/2020 04:15 PM Medications Reviewed:     Current Facility-Administered Medications   Medication Dose Route Frequency    amLODIPine (NORVASC) tablet 5 mg  5 mg Oral DAILY    And    losartan (COZAAR) tablet 100 mg  100 mg Oral DAILY    levoFLOXacin (LEVAQUIN) 500 mg in D5W IVPB  500 mg IntraVENous Q24H    sodium chloride (NS) flush 5-10 mL  5-10 mL IntraVENous PRN    sodium chloride (NS) flush 5-40 mL  5-40 mL IntraVENous Q8H    sodium chloride (NS) flush 5-40 mL  5-40 mL IntraVENous PRN    acetaminophen (TYLENOL) tablet 650 mg  650 mg Oral Q6H PRN    Or    acetaminophen (TYLENOL) suppository 650 mg  650 mg Rectal Q6H PRN    polyethylene glycol (MIRALAX) packet 17 g  17 g Oral DAILY PRN    promethazine (PHENERGAN) tablet 12.5 mg  12.5 mg Oral Q6H PRN    Or    ondansetron (ZOFRAN) injection 4 mg  4 mg IntraVENous Q6H PRN    piperacillin-tazobactam (ZOSYN) 3.375 g in 0.9% sodium chloride (MBP/ADV) 100 mL  3.375 g IntraVENous Q8H    insulin lispro (HUMALOG) injection   SubCUTAneous AC&HS    glucose chewable tablet 16 g  4 Tab Oral PRN    glucagon (GLUCAGEN) injection 1 mg  1 mg IntraMUSCular PRN    rosuvastatin (CRESTOR) tablet 10 mg  10 mg Oral QHS    dextrose 10 % infusion 125-250 mL  125-250 mL IntraVENous PRN    lactated Ringers infusion  50 mL/hr IntraVENous CONTINUOUS     ______________________________________________________________________  EXPECTED LENGTH OF STAY: 4d 19h  ACTUAL LENGTH OF STAY:          3                 Radha Tang MD

## 2020-11-04 NOTE — PROCEDURES
Arthrocentesis without Injection Procedural Report    Indications: Unexplained ankle swelling    Preprocedural Diagnosis: Left ankle DJD    Postprocedural Diagnosis: Left ankle DJD    Provider: ANTHONY Guillory     Anesthesia: local    Allergy: No Known Allergies    Procedure Details: The risks,benefits and alternatives of a joint aspiration were explained and consent was obtained for the procedure. The aspiration site was marked and the area was cleansed using Chlorprep. Anesthetic using 1% lidocaine was infiltrated locally. Using a 18 gauge needle, the ankle joint was entered just medial to the tibialis anterior tendon. No fluid was obtained from the joint. Needle was repositioned to confirm we were in the ankle joint. Again no fluid was encountered. A band-aid was applied. The patient tolerated the procedure well. Complications:  None; patient tolerated the procedure well.              Signed By: ANTHONY Guillory

## 2020-11-04 NOTE — PROGRESS NOTES
Problem: Risk for Spread of Infection  Goal: Prevent transmission of infectious organism to others  Description: Prevent the transmission of infectious organisms to other patients, staff members, and visitors. Outcome: Progressing Towards Goal     Problem: Falls - Risk of  Goal: *Absence of Falls  Description: Document Jose Maria Click Fall Risk and appropriate interventions in the flowsheet. Outcome: Progressing Towards Goal  Note: Fall Risk Interventions:  Mobility Interventions: Communicate number of staff needed for ambulation/transfer, Patient to call before getting OOB    Mentation Interventions: Adequate sleep, hydration, pain control, More frequent rounding    Medication Interventions: Patient to call before getting OOB    Elimination Interventions: Call light in reach, Patient to call for help with toileting needs    Problem: Pressure Injury - Risk of  Goal: *Prevention of pressure injury  Description: Document Sulaiman Scale and appropriate interventions in the flowsheet.   Outcome: Progressing Towards Goal  Note: Pressure Injury Interventions:  Sensory Interventions: Assess changes in LOC, Assess need for specialty bed, Check visual cues for pain, Keep linens dry and wrinkle-free, Minimize linen layers    Moisture Interventions: Absorbent underpads, Assess need for specialty bed, Apply protective barrier, creams and emollients, Internal/External urinary devices    Activity Interventions: Assess need for specialty bed, Increase time out of bed, Pressure redistribution bed/mattress(bed type)    Mobility Interventions: Assess need for specialty bed, Pressure redistribution bed/mattress (bed type)    Nutrition Interventions: Document food/fluid/supplement intake    Friction and Shear Interventions: HOB 30 degrees or less    Problem: Breathing Pattern - Ineffective  Goal: *Absence of hypoxia  Outcome: Progressing Towards Goal  Goal: *Use of effective breathing techniques  Outcome: Progressing Towards Goal     Problem: Diabetes Self-Management  Goal: *Disease process and treatment process  Description: Define diabetes and identify own type of diabetes; list 3 options for treating diabetes. Outcome: Progressing Towards Goal  Goal: *Using medications safely  Description: State effect of diabetes medications on diabetes; name diabetes medication taking, action and side effects. Outcome: Progressing Towards Goal  Goal: *Monitoring blood glucose, interpreting and using results  Description: Identify recommended blood glucose targets  and personal targets. Outcome: Progressing Towards Goal  Goal: *Prevention, detection and treatment of chronic complications  Description: Define the natural course of diabetes and describe the relationship of blood glucose levels to long term complications of diabetes. Outcome: Progressing Towards Goal     Problem: Sepsis: Day 3  Goal: Medications  Outcome: Progressing Towards Goal  Goal: Respiratory  Outcome: Progressing Towards Goal     Problem:  Body Temperature -  Risk of, Imbalanced  Goal: *Absence of heat stress or hyperthermia signs and symptoms  Outcome: Progressing Towards Goal

## 2020-11-04 NOTE — PROGRESS NOTES
Problem: Mobility Impaired (Adult and Pediatric)  Goal: *Acute Goals and Plan of Care (Insert Text)  Description: FUNCTIONAL STATUS PRIOR TO ADMISSION: Patient was modified independent using a single point cane for functional mobility. HOME SUPPORT PRIOR TO ADMISSION: The patient lived with wife, but he did not require assist.    Physical Therapy Goals  Initiated 11/4/2020  1. Patient will move from supine to sit and sit to supine  and roll side to side in bed with modified independence within 7 day(s). 2.  Patient will transfer from bed to chair and chair to bed with modified independence using the least restrictive device within 7 day(s). 3.  Patient will perform sit to stand with supervision/set-up within 7 day(s). 4.  Patient will ambulate with supervision/set-up for 100 feet with the least restrictive device within 7 day(s). 5.  Patient will ascend/descend 4 stairs with 1 handrail(s) with supervision/set-up within 7 day(s). Outcome: Progressing Towards Goal   PHYSICAL THERAPY EVALUATION  Patient: Leonardo Mckinney (43 y.o. male)  Date: 11/4/2020  Primary Diagnosis: Acute delirium [R41.0]  Encephalopathy [G93.40]        Precautions:   Fall      ASSESSMENT  Based on the objective data described below, the patient presents with decreased mobility and increased pain compared to baseline after being admitted with delirium after an attempted dental procedure. He traveled from [place] for procedure and lives in [place]. Prior to admission, he was able to ambulate without assist, using a cane at times. He denies any history of falls in the past year, but did have one a year ago. On exam, he is somewhat slow with his processing, but oriented and able to follow commands without difficulty. He is severely limited by pain in his ankles and his LUE, both of which are hypersensitive to any touch.   The LUE pain is primarily anterior proximal to the antecubital space, but the entire arm is edematous and he even avoids using his L hand. He needed max assist to get to the EOB to sit and was only able to maintain sitting briefly due to pain. At this point, he is well below his baseline in terms of mobility, but this is primarily related to pain. If his pain is resolved,  expect he will be able to mobilize with considerably less assistance. Current Level of Function Impacting Discharge (mobility/balance): max assist for minimal activity    Functional Outcome Measure: The patient scored Total: 20/100 on the Barthel Index which is indicative of severely impaired ability to care for basic self needs/dependency on others. Other factors to consider for discharge: lives in [place]     Patient will benefit from skilled therapy intervention to address the above noted impairments. PLAN :  Recommendations and Planned Interventions: bed mobility training, transfer training, gait training, therapeutic exercises, patient and family training/education, and therapeutic activities      Frequency/Duration: Patient will be followed by physical therapy:  5 times a week to address goals. Recommendation for discharge: (in order for the patient to meet his/her long term goals)  To be determined: at this point,  he would need rehab before returning home, but we will continue to reassess each session    This discharge recommendation:  Has not yet been discussed the attending provider and/or case management    IF patient discharges home will need the following DME: to be determined (TBD)         SUBJECTIVE:   Patient stated I am getting old, I guess.     OBJECTIVE DATA SUMMARY:   HISTORY:    Past Medical History:   Diagnosis Date    Diabetes ([address]    No past surgical history on file.     Personal factors and/or comorbidities impacting plan of care: as noted above, plus obesity    Home Situation  Home Environment: Private residence  # Steps to Enter: 4  One/Two Story Residence: One story  Living Alone: No  Support Systems: Spouse/Significant Other/Partner  Patient Expects to be Discharged to[de-identified] Private residence  Current DME Used/Available at Home: [address] [place] [place]    EXAMINATION/PRESENTATION/DECISION MAKING:   Critical Behavior:  Neurologic State: Alert  Orientation Level: Oriented to person, Oriented to situation, Disoriented to situation, Disoriented to time(periodic confusion )  Cognition: Follows commands     Hearing: Auditory  Auditory Impairment: None  Skin:  mild warmth at both ankles and L elbow  Edema: LUE and bilateral ankles  Range Of Motion:  AROM: Generally decreased, functional(limited bilat ankles, bilat shld, L elbow all due to pain)                       Strength:    Strength: Generally decreased, functional(LUE limited by pain 2-/5, otherwise 3/5)                    Tone & Sensation:   Tone: Normal              Sensation: (hypersensitive bilat feet and LUE)               Coordination:  Coordination: (unable to use LUE effectively due to pain)  Vision:      Functional Mobility:  Bed Mobility:  Rolling: Maximum assistance  Supine to Sit: Maximum assistance;Bed Modified(HOB elevated)  Sit to Supine: Maximum assistance; Additional time  Scooting: Maximum assistance; Additional time;Assist x2  Transfers:                             Balance:   Sitting: Impaired; Without support  Sitting - Static: Fair (occasional)  Sitting - Dynamic: Poor (constant support)(unable to lean onto LUE due to pain)  Standing: (unable to attempt due to pain)  Ambulation/Gait Training:                                                         Stairs: Therapeutic Exercises:   Sitting balance, attempted ROM of LUE and LEs    Functional Measure:  Barthel Index:    Bathin  Bladder: 0(unable to manage urinal in bed)  Bowels: 10  Groomin  Dressin  Feedin  Mobility: 0  Stairs: 0  Toilet Use: 0  Transfer (Bed to Chair and Back): 5  Total: 20/100       The Barthel ADL Index: Guidelines  1.  The index should be used as a record of what a patient does, not as a record of what a patient could do. 2. The main aim is to establish degree of independence from any help, physical or verbal, however minor and for whatever reason. 3. The need for supervision renders the patient not independent. 4. A patient's performance should be established using the best available evidence. Asking the patient, friends/relatives and nurses are the usual sources, but direct observation and common sense are also important. However direct testing is not needed. 5. Usually the patient's performance over the preceding 24-48 hours is important, but occasionally longer periods will be relevant. 6. Middle categories imply that the patient supplies over 50 per cent of the effort. 7. Use of aids to be independent is allowed. Saima De., Barthel, D.W. (2881). Functional evaluation: the Barthel Index. [address] [facility] (14)2. Micheline Mittal dee DAINA Armenta, Diana Meade., Nnamdi Javed., [place], [address] [facility] (1999). Measuring the change indisability after inpatient rehabilitation; comparison of the responsiveness of the Barthel Index and Functional [place] Measure. Journal of Neurology, Neurosurgery, and Psychiatry, 66(4), 430-698. Colette Nettles, N.J.A, CHRISTINA LoveJ.EZIO, & Hoang Martinez, M.A. (2004.) Assessment of post-stroke quality of life in cost-effectiveness studies: The usefulness of the Barthel Index and the EuroQoL-5D.  Quality of Life Research, 15, 236-03        Physical Therapy Evaluation Charge Determination   History Examination Presentation Decision-Making   HIGH Complexity :3+ comorbidities / personal factors will impact the outcome/ POC  MEDIUM Complexity : 3 Standardized tests and measures addressing body structure, function, activity limitation and / or participation in recreation  MEDIUM Complexity : Evolving with changing characteristics  MEDIUM Complexity : FOTO score of 26-74      Based on the above components, the patient evaluation is determined to be of the following complexity level: MEDIUM    Pain Rating:  Pain as noted above    Activity Tolerance:   Fair and requires frequent rest breaks    After treatment patient left in no apparent distress:   Supine in bed, Call bell within reach, and Side rails x 3    COMMUNICATION/EDUCATION:   The patients plan of care was discussed with: Occupational therapist and Registered nurse. Fall prevention education was provided and the patient/caregiver indicated understanding., Patient/family have participated as able in goal setting and plan of care. , and Patient/family agree to work toward stated goals and plan of care.     Thank you for this referral.  Jovita Talbert, PT   Time Calculation: 37 mins

## 2020-11-04 NOTE — PROGRESS NOTES
Problem: Falls - Risk of  Goal: *Absence of Falls  Description: Document Phoebe Naqvi Fall Risk and appropriate interventions in the flowsheet. Outcome: Progressing Towards Goal  Note: Fall Risk Interventions:  Pt remains free of falls during admission. Call bell and frequently used items within reach. Bedside table within reach. Pt provided nonskid socks and instructed to call out for nurse when in need of assistance. Problem: Pressure Injury - Risk of  Goal: *Prevention of pressure injury  Description: Document Sulaiman Scale and appropriate interventions in the flowsheet. Outcome: Progressing Towards Goal  Note: Pressure Injury Interventions:  Patient turned every two hours, moisture barrier applied, heels elevated, pillows and blankets used, pressure redistributing mattress , linens dry. Problem: Breathing Pattern - Ineffective  Goal: *Absence of hypoxia  Outcome: Progressing Towards Goal  Pt on bipap at night and RA during day. No s/s of respiratory distress. Will continue to monitor. Problem: Body Temperature -  Risk of, Imbalanced  Goal: *Absence of heat stress or hyperthermia signs and symptoms  Outcome: Progressing Towards Goal  Pt running fevers. Tynelol given. Will continue to monitor. Bedside shift change report given to Dai Chambers (oncoming nurse) by Dinesh Pennington (offgoing nurse).  Report included the following information SBAR, MAR, Recent Results and Cardiac Rhythm SR.

## 2020-11-04 NOTE — PROGRESS NOTES
1220: Bedside shift change report given to Lurdes Lee RN (oncoming nurse) by Jabier Fisher RN (offgoing nurse). Report included the following information SBAR, Kardex, ED Summary, Intake/Output, MAR, Accordion, Recent Results and Cardiac Rhythm NSR.

## 2020-11-04 NOTE — PROGRESS NOTES
Bedside shift change report given to Mirna Lucas RN (oncoming nurse) by Les Jaime RN (offgoing nurse). Report included the following information SBAR, Kardex, Procedure Summary, Intake/Output, MAR and Recent Results.

## 2020-11-04 NOTE — CONSULTS
Geriatric Fracture Consult  Patient: Mariann Barnett  YOB: 1950   MRN: [id number]      Consult Date: 11/4/2020     Chief Complaint: Bilateral ankle swelling  ED Presentation Time: Admitted 5 days ago  Mechanism of Injury: Denies any trauma to the ankles, swelling has been present for many months  Ambulatory Status: non-ambulatory  Past Medical History:   Past Medical History:   Diagnosis Date    [facility]        Allergies: No Known Allergies   Past Surgical History: No past surgical history on file.    Social History:   Social History     Socioeconomic History    Marital status:      Spouse name: Not on file    Number of children: Not on file    Years of education: Not on file    Highest education level: Not on file   Occupational History    Not on file   Social Needs    Financial resource strain: Not on file    Food insecurity     Worry: Not on file     Inability: Not on file    Transportation needs     Medical: Not on file     Non-medical: Not on file   Tobacco Use    Smoking status: Never Smoker    Smokeless tobacco: Never Used   Substance and Sexual Activity    Alcohol use: Yes     Comment: social    Drug use: Never    Sexual activity: Not on file   Lifestyle    Physical activity     Days per week: Not on file     Minutes per session: Not on file    Stress: Not on file   Relationships    Social connections     Talks on phone: Not on file     Gets together: Not on file     Attends Latter day service: Not on file     Active member of club or organization: Not on file     Attends meetings of clubs or organizations: Not on file     Relationship status: Not on file    Intimate partner violence     Fear of current or ex partner: Not on file     Emotionally abused: Not on file     Physically abused: Not on file     Forced sexual activity: Not on file   Other Topics Concern    Not on file   Social History Narrative    Not on file      Dwelling Status: Alone with Spouse/Significant Other - lives in [place], [place]  Current Anticoagulant Medications: none    X-RAYS:  None    Physical Exam:   General: 69yo male, cooperative, no distress, morbidly obese  CNS: alert/oriented, normal mood  Vascular: intact capillary refill, bilateral lower extremity edema   Skin: ankles swollen, skin dry, no open wound  Extremities: appears to have bilateral ankle effusions, both ankles tender, pain with passive ROM of ankle joints  Neurologic: no acute motor/sensory deficits    Assessment: Bilateral ankle effusions - chronic    Plan: He has not had any work-up for what sounds like an acute exacerbation of a chronic problem. Will order bilateral ankle xrays. Acute gouty arthropathy seems more likely than chronic bilateral septic ankle joints. If there is concern that his ankles are the source of his sepsis, would need aspirations. Will follow.          Signed by: Quilla Kayser, PA   Today's Date: November 4, 2020

## 2020-11-04 NOTE — PROGRESS NOTES
ID Progress Note  2020    Subjective:     Having fevers. He continues to have bilateral ankle pain. No dyspnea, abdominal pain, headache or sore throat    ROS: No anaphylaxis, seizures, syncope, hematemesis, hematochezia     Objective:     Vitals:   Visit Vitals  /62   Pulse 73   Temp 98.2 °F (36.8 °C)   Resp 19   Ht 6' 2\" (1.88 m)   Wt 122 kg (268 lb 15.4 oz)   SpO2 100%   BMI 34.53 kg/m²        Tmax:  Temp (24hrs), Av.4 °F (38 °C), Min:98.2 °F (36.8 °C), Max:103 °F (39.4 °C)      Exam:    Not in distress  Pink conjunctivae, anicteric sclerae  No cervical lymphdenopathy   Lung clear, no rales, wheezes or rhonchi   Heart: s1, s2, RRR, no murmurs rubs or clicks  Abdomen: soft nontender, no guarding or rebound  Bilateral ankles are swollen warm and tender   Speech fluent     Labs:   Lab Results   Component Value Date/Time    WBC 15.8 (H) 2020 05:15 AM    HGB 10.2 (L) 2020 05:15 AM    HCT 31.4 (L) 2020 05:15 AM    PLATELET 065  05:15 AM    MCV 87.0 2020 05:15 AM     Lab Results   Component Value Date/Time    Sodium 133 (L) 2020 05:15 AM    Potassium 3.3 (L) 2020 05:15 AM    Chloride 96 (L) 2020 05:15 AM    CO2 28 2020 05:15 AM    Anion gap 9 2020 05:15 AM    Glucose 142 (H) 2020 05:15 AM    BUN 8 2020 05:15 AM    Creatinine 1.03 2020 05:15 AM    BUN/Creatinine ratio 8 (L) 2020 05:15 AM    GFR est AA >60 2020 05:15 AM    GFR est non-AA >60 2020 05:15 AM    Calcium 9.1 2020 05:15 AM    Bilirubin, total 0.7 10/31/2020 03:16 AM    Alk.  phosphatase 55 10/31/2020 03:16 AM    Protein, total 6.6 10/31/2020 03:16 AM    Albumin 3.0 (L) 10/31/2020 03:16 AM    Globulin 3.6 10/31/2020 03:16 AM    A-G Ratio 0.8 (L) 10/31/2020 03:16 AM    ALT (SGPT) 19 10/31/2020 03:16 AM       Rheumatoid factor negative       Assessment:     #1 fever     #2 ankle tenderness     #3 groundglass opacities on CT - covid neg x 2, respiratory viral panel neg      #4 diabetes     #5 hypertension     #6 dyslipidemia    Recommendations: The patient continues to have fever. His main symptom is tenderness and warmth on bilateral ankles. He tells me that this happens once in a while And it has been happening for a long time now. ff up  WILLIAM and CCP antibody tomorrow. He may benefit from Ortho consult.     Continue abx     Luther Claros MD

## 2020-11-04 NOTE — PROGRESS NOTES
Problem: Self Care Deficits Care Plan (Adult)  Goal: *Acute Goals and Plan of Care (Insert Text)  Description:   FUNCTIONAL STATUS PRIOR TO ADMISSION: Patient was modified independent using a single point cane for functional mobility. HOME SUPPORT: The patient lived with wife but did not require assist.    Occupational Therapy Goals  Initiated 11/4/2020  1. Patient will perform grooming in unsupported sitting position with supervision/set-up within 7 day(s). 2.  Patient will perform toilet transfers to [facility] with moderate assistance using most appropriate DME prn within 7 day(s). 3.  Patient will perform all aspects of toileting with minimal assistance using most appropriate DME prn within 7 day(s). 4.  Patient will participate in gentle upper extremity therapeutic exercise/activities with minimal assistance for 7 minutes within 7 day(s). 5.  Patient will utilize energy conservation techniques during functional activities with minimal verbal cues within 7 day(s). Outcome: Progressing Towards Goal     OCCUPATIONAL THERAPY EVALUATION  Patient: Hansel Caldwell (88 y.o. male)  Date: 11/4/2020  Primary Diagnosis: Acute delirium [R41.0]  Encephalopathy [G93.40]        Precautions:  Fall    ASSESSMENT  Based on the objective data described below, the patient presents with impaired functional mobility and balance, decreased activity tolerance and cardiopulmonary endurance, and significant BLE and LUE pain to movement and touch following admission for altered mental status (with onset during dental procedure). Patient received in bed - noted incontinent of urine. Patient initially with slowed processing - difficult to assess cognition and physical abilities, however patient more alert with continued engagement. Patient able to briefly bridge in bed x3 to facilitate doffing soiled brief, donning clean brief, and managing linens.  Per patient, continent at baseline and was aware he was wet but felt uncomfortable telling someone - encouraged patient to use call bell whenever needed and explained concern for skin integrity if soiled for extended period. Bed put into chair position - patient able to use R hand to clean thighs with set-up - minimally using L hand to adjust gown but limited 2/2 significant pain. At end of session, LUE elevated via pillows. Provided education on active movement as able to maintain ROM and strength and to facilitate improved edema management (particularly in LUE). Patient verbalized understanding of education provided, appreciative of intervention. Patient is well below modified independent baseline at this time - recommend discharge to rehab setting to maximize patient safety and independence with level TBD pending patient pain management, activity tolerance and progress. Current Level of Function Impacting Discharge (ADLs/self-care): max assist for mobility; able to bridge briefly with SBA; set-up (of all fine motor aspects)    Functional Outcome Measure: The patient scored Total: 20/100 on the Barthel Index outcome measure. Other factors to consider for discharge: significant pain     Patient will benefit from skilled therapy intervention to address the above noted impairments. PLAN :  Recommendations and Planned Interventions: self care training, functional mobility training, therapeutic exercise, balance training, therapeutic activities, cognitive retraining, endurance activities, patient education, and home safety training    Frequency/Duration: Patient will be followed by occupational therapy 5 times a week to address goals. Recommendation for discharge (in order for the patient to meet his/her long term goals):    To be determined: at this time recommend rehab, level TBD pending patient activity tolerance and progress    This discharge recommendation:  Has not yet been discussed the attending provider and/or case management    IF patient discharges home will need the following DME: TBD       SUBJECTIVE:   Patient stated so I just press this button? \" in reference to call bell. OBJECTIVE DATA SUMMARY:   HISTORY   Past Medical History:   Diagnosis Date    Diabetes ([address]    No past surgical history on file. Expanded or extensive additional review of patient history:   Home Situation  Home Environment: Private residence  # Steps to Enter: 4  One/Two Story Residence: One story  Living Alone: No  Support Systems: Spouse/Significant Other/Partner  Patient Expects to be Discharged to[de-identified] Private residence  Current DME Used/Available at Home: Cane, straight    Hand dominance: Right    EXAMINATION OF PERFORMANCE DEFICITS  Cognitive/Behavioral Status  Neurologic State: Alert  Orientation Level: Oriented to person;Oriented to place  Cognition: Follows commands    Skin  Significant BLE edema and LUE edema. Hearing  Auditory  Auditory Impairment: None    Vision/Perceptual    Tracking appropriately. Range of Motion  AROM: Generally decreased, functional(LUE limited 2/2 pain)    Strength:  Strength: Generally decreased, functional(LUE limited 2/2 pain)    Coordination  Coordination: Generally decreased, functional(increased difficulty with B tasks 2/2 pain with use of LUE)  Fine Motor Skills-Upper: Right Intact; Left Impaired(L 2/2 pain)  Tone & Sensation  Sensation: (hypersensitive bilat feet and LUE)    Balance  Sitting: Impaired; Without support  Sitting - Static: Fair (occasional)  Sitting - Dynamic: Poor (constant support)(unable to lean onto LUE due to pain)  Standing: (unable to attempt due to pain)    Functional Mobility and Transfers for ADLs  Bed Mobility:  Rolling: Maximum assistance  Supine to Sit: Maximum assistance;Bed Modified(HOB elevated)  Sit to Supine: Maximum assistance; Additional time  Scooting: Maximum assistance; Additional time;Assist x2    Transfers:   Toilet Transfer : Maximum assistance(infer for bed level toileting 2/2 mobility)    ADL Assessment  Feeding: Setup; Minimum assistance  Oral Facial Hygiene/Grooming: Setup;Minimum assistance  Upper Body Dressing: Maximum assistance  Lower Body Dressing: Total assistance  Toileting: Maximum assistance    ADL Intervention and Task Modifications   Lower Body Dressing Assistance  Dressing Assistance: Total assistance(dependent)  Protective Undergarmet: Total assistance (dependent)(able to bridge briefly to facilitate)  Position Performed: Supine  Cues: Verbal cues provided;Visual cues provided;Physical assistance    Toileting  Bladder Hygiene: Set-up; Minimum assistance  Clothing Management: Minimum assistance    Functional Measure: Barthel Index  Bathin  Bladder: 5  Bowels: 10  Groomin  Dressin  Feedin  Mobility: 0  Stairs: 0  Toilet Use: 0  Transfer (Bed to Chair and Back): 0  Total: 20/100     The Barthel ADL Index: Guidelines  1. The index should be used as a record of what a patient does, not as a record of what a patient could do. 2. The main aim is to establish degree of independence from any help, physical or verbal, however minor and for whatever reason. 3. The need for supervision renders the patient not independent. 4. A patient's performance should be established using the best available evidence. Asking the patient, friends/relatives and nurses are the usual sources, but direct observation and common sense are also important. However direct testing is not needed. 5. Usually the patient's performance over the preceding 24-48 hours is important, but occasionally longer periods will be relevant. 6. Middle categories imply that the patient supplies over 50 per cent of the effort. 7. Use of aids to be independent is allowed. Ericka Miller., Barthel, D.W. ([id number]). Functional evaluation: the Barthel Index. [address] [facility] (14)2. DAINA Campos, Chacorta Covarrubias.Terra., Sara, [facility] ().  Measuring the change indisability after inpatient rehabilitation; comparison of the responsiveness of the Barthel Index and Functional [place] Measure. Journal of Neurology, Neurosurgery, and Psychiatry, 66(4), 252-208. [place].LEO, TESSIE Love, & Héctor River M.A. (2004.) Assessment of post-stroke quality of life in cost-effectiveness studies: The usefulness of the Barthel Index and the EuroQoL-5D. Quality of Life Research, 15, 987-71     Occupational Therapy Evaluation Charge Determination   History Examination Decision-Making   LOW Complexity : Brief history review  HIGH Complexity : 5 or more performance deficits relating to physical, cognitive , or psychosocial skils that result in activity limitations and / or participation restrictions HIGH Complexity : Patient presents with comorbidities that affect occupational performance. Signifigant modification of tasks or assistance (eg, physical or verbal) with assessment (s) is necessary to enable patient to complete evaluation       Based on the above components, the patient evaluation is determined to be of the following complexity level: LOW   Pain Rating  Significant pain in BLE and LUE with touch and movement. Activity Tolerance  Fair and requires rest breaks  Please refer to the flowsheet for vital signs taken during this treatment. After treatment patient left in no apparent distress:    Supine in bed, Call bell within reach, Bed / chair alarm activated, and Side rails x 3    COMMUNICATION/EDUCATION   The patients plan of care was discussed with: Physical therapist and Registered nurse. Home safety education was provided and the patient/caregiver indicated understanding., Patient/family have participated as able in goal setting and plan of care. , and Patient/family agree to work toward stated goals and plan of care. Thank you for this referral.  CHANDRIKA Bruno/L  Time Calculation: 29 mins      . dimas

## 2020-11-05 ENCOUNTER — APPOINTMENT (OUTPATIENT)
Dept: VASCULAR SURGERY | Age: 70
DRG: 871 | End: 2020-11-05
Attending: NURSE PRACTITIONER
Payer: MEDICARE

## 2020-11-05 LAB
ANA SER QL: NEGATIVE
ANION GAP SERPL CALC-SCNC: 10 MMOL/L (ref 5–15)
BASOPHILS # BLD: 0.1 K/UL (ref 0–0.1)
BASOPHILS NFR BLD: 0 % (ref 0–1)
BUN SERPL-MCNC: 11 MG/DL (ref 6–20)
BUN/CREAT SERPL: 11 (ref 12–20)
CALCIUM SERPL-MCNC: 8.9 MG/DL (ref 8.5–10.1)
CHLORIDE SERPL-SCNC: 98 MMOL/L (ref 97–108)
CO2 SERPL-SCNC: 27 MMOL/L (ref 21–32)
CREAT SERPL-MCNC: 0.98 MG/DL (ref 0.7–1.3)
DIFFERENTIAL METHOD BLD: ABNORMAL
EOSINOPHIL # BLD: 0 K/UL (ref 0–0.4)
EOSINOPHIL NFR BLD: 0 % (ref 0–7)
ERYTHROCYTE [DISTWIDTH] IN BLOOD BY AUTOMATED COUNT: 13.2 % (ref 11.5–14.5)
GLUCOSE BLD STRIP.AUTO-MCNC: 139 MG/DL (ref 65–100)
GLUCOSE BLD STRIP.AUTO-MCNC: 139 MG/DL (ref 65–100)
GLUCOSE BLD STRIP.AUTO-MCNC: 155 MG/DL (ref 65–100)
GLUCOSE BLD STRIP.AUTO-MCNC: 165 MG/DL (ref 65–100)
GLUCOSE SERPL-MCNC: 125 MG/DL (ref 65–100)
HCT VFR BLD AUTO: 27.2 % (ref 36.6–50.3)
HGB BLD-MCNC: 9 G/DL (ref 12.1–17)
IMM GRANULOCYTES # BLD AUTO: 0.1 K/UL (ref 0–0.04)
IMM GRANULOCYTES NFR BLD AUTO: 1 % (ref 0–0.5)
LYMPHOCYTES # BLD: 1.5 K/UL (ref 0.8–3.5)
LYMPHOCYTES NFR BLD: 10 % (ref 12–49)
M PNEUMO IGG SER IA-ACNC: 1274 U/ML (ref 0–99)
M PNEUMO IGM SER IA-ACNC: <770 U/ML (ref 0–769)
MCH RBC QN AUTO: 28.8 PG (ref 26–34)
MCHC RBC AUTO-ENTMCNC: 33.1 G/DL (ref 30–36.5)
MCV RBC AUTO: 87.2 FL (ref 80–99)
MONOCYTES # BLD: 1.6 K/UL (ref 0–1)
MONOCYTES NFR BLD: 10 % (ref 5–13)
NEUTS SEG # BLD: 12.4 K/UL (ref 1.8–8)
NEUTS SEG NFR BLD: 79 % (ref 32–75)
NRBC # BLD: 0 K/UL (ref 0–0.01)
NRBC BLD-RTO: 0 PER 100 WBC
PLATELET # BLD AUTO: 268 K/UL (ref 150–400)
PMV BLD AUTO: 10.9 FL (ref 8.9–12.9)
POTASSIUM SERPL-SCNC: 3.3 MMOL/L (ref 3.5–5.1)
RBC # BLD AUTO: 3.12 M/UL (ref 4.1–5.7)
SERVICE CMNT-IMP: ABNORMAL
SODIUM SERPL-SCNC: 135 MMOL/L (ref 136–145)
WBC # BLD AUTO: 15.6 K/UL (ref 4.1–11.1)

## 2020-11-05 PROCEDURE — 82962 GLUCOSE BLOOD TEST: CPT

## 2020-11-05 PROCEDURE — 36415 COLL VENOUS BLD VENIPUNCTURE: CPT

## 2020-11-05 PROCEDURE — 74011250637 HC RX REV CODE- 250/637: Performed by: NURSE PRACTITIONER

## 2020-11-05 PROCEDURE — 97530 THERAPEUTIC ACTIVITIES: CPT

## 2020-11-05 PROCEDURE — 65660000000 HC RM CCU STEPDOWN

## 2020-11-05 PROCEDURE — 93971 EXTREMITY STUDY: CPT

## 2020-11-05 PROCEDURE — 74011000258 HC RX REV CODE- 258: Performed by: INTERNAL MEDICINE

## 2020-11-05 PROCEDURE — 97535 SELF CARE MNGMENT TRAINING: CPT

## 2020-11-05 PROCEDURE — 74011250636 HC RX REV CODE- 250/636: Performed by: NURSE PRACTITIONER

## 2020-11-05 PROCEDURE — 74011250637 HC RX REV CODE- 250/637: Performed by: INTERNAL MEDICINE

## 2020-11-05 PROCEDURE — 93970 EXTREMITY STUDY: CPT

## 2020-11-05 PROCEDURE — 74011250636 HC RX REV CODE- 250/636: Performed by: INTERNAL MEDICINE

## 2020-11-05 PROCEDURE — 74011636637 HC RX REV CODE- 636/637: Performed by: INTERNAL MEDICINE

## 2020-11-05 PROCEDURE — 74011250637 HC RX REV CODE- 250/637: Performed by: HOSPITALIST

## 2020-11-05 PROCEDURE — 85025 COMPLETE CBC W/AUTO DIFF WBC: CPT

## 2020-11-05 PROCEDURE — 80048 BASIC METABOLIC PNL TOTAL CA: CPT

## 2020-11-05 RX ORDER — HEPARIN SODIUM 5000 [USP'U]/ML
5000 INJECTION, SOLUTION INTRAVENOUS; SUBCUTANEOUS EVERY 8 HOURS
Status: DISCONTINUED | OUTPATIENT
Start: 2020-11-05 | End: 2020-11-10 | Stop reason: HOSPADM

## 2020-11-05 RX ORDER — POTASSIUM CHLORIDE 750 MG/1
40 TABLET, FILM COATED, EXTENDED RELEASE ORAL
Status: COMPLETED | OUTPATIENT
Start: 2020-11-05 | End: 2020-11-05

## 2020-11-05 RX ADMIN — LOSARTAN POTASSIUM 100 MG: 50 TABLET, FILM COATED ORAL at 09:15

## 2020-11-05 RX ADMIN — PIPERACILLIN AND TAZOBACTAM 3.38 G: 3; .375 INJECTION, POWDER, LYOPHILIZED, FOR SOLUTION INTRAVENOUS at 14:04

## 2020-11-05 RX ADMIN — LEVOFLOXACIN 500 MG: 5 INJECTION, SOLUTION INTRAVENOUS at 18:07

## 2020-11-05 RX ADMIN — Medication 10 ML: at 22:00

## 2020-11-05 RX ADMIN — INSULIN LISPRO 2 UNITS: 100 INJECTION, SOLUTION INTRAVENOUS; SUBCUTANEOUS at 22:09

## 2020-11-05 RX ADMIN — Medication 10 ML: at 06:00

## 2020-11-05 RX ADMIN — Medication 10 ML: at 14:00

## 2020-11-05 RX ADMIN — PIPERACILLIN AND TAZOBACTAM 3.38 G: 3; .375 INJECTION, POWDER, LYOPHILIZED, FOR SOLUTION INTRAVENOUS at 06:08

## 2020-11-05 RX ADMIN — AMLODIPINE BESYLATE 5 MG: 5 TABLET ORAL at 09:16

## 2020-11-05 RX ADMIN — PIPERACILLIN AND TAZOBACTAM 3.38 G: 3; .375 INJECTION, POWDER, LYOPHILIZED, FOR SOLUTION INTRAVENOUS at 21:04

## 2020-11-05 RX ADMIN — ACETAMINOPHEN 650 MG: 325 TABLET ORAL at 02:57

## 2020-11-05 RX ADMIN — ROSUVASTATIN CALCIUM 10 MG: 10 TABLET, FILM COATED ORAL at 22:00

## 2020-11-05 RX ADMIN — POTASSIUM CHLORIDE 40 MEQ: 750 TABLET, FILM COATED, EXTENDED RELEASE ORAL at 14:04

## 2020-11-05 RX ADMIN — ACETAMINOPHEN 650 MG: 325 TABLET ORAL at 18:07

## 2020-11-05 RX ADMIN — HEPARIN SODIUM 5000 UNITS: 5000 INJECTION INTRAVENOUS; SUBCUTANEOUS at 21:03

## 2020-11-05 NOTE — PROGRESS NOTES
Bedside and Verbal shift change report given to [address]an (oncoming nurse) by Antonio Dailey RN (offgoing nurse). Report included the following information SBAR, Kardex, ED Summary, Procedure Summary, Intake/Output, MAR, Recent Results, Cardiac Rhythm NSR, Alarm Parameters , Quality Measures and Dual Neuro Assessment.

## 2020-11-05 NOTE — PROGRESS NOTES
Problem: Self Care Deficits Care Plan (Adult)  Goal: *Acute Goals and Plan of Care (Insert Text)  Description:   FUNCTIONAL STATUS PRIOR TO ADMISSION: Patient was modified independent using a single point cane for functional mobility. HOME SUPPORT: The patient lived with wife but did not require assist.    Occupational Therapy Goals  Initiated 11/4/2020  1. Patient will perform grooming in unsupported sitting position with supervision/set-up within 7 day(s). 2.  Patient will perform toilet transfers to [facility] with moderate assistance using most appropriate DME prn within 7 day(s). 3.  Patient will perform all aspects of toileting with minimal assistance using most appropriate DME prn within 7 day(s). 4.  Patient will participate in gentle upper extremity therapeutic exercise/activities with minimal assistance for 7 minutes within 7 day(s). 5.  Patient will utilize energy conservation techniques during functional activities with minimal verbal cues within 7 day(s). Outcome: Progressing Towards Goal     OCCUPATIONAL THERAPY TREATMENT  Patient: Jodi Nolan (75 y.o. male)  Date: 11/5/2020  Diagnosis: Acute delirium [R41.0]  Encephalopathy [G93.40]   Acute delirium      Precautions: Fall  Chart, occupational therapy assessment, plan of care, and goals were reviewed. ASSESSMENT  Patient continues with skilled OT services and is progressing towards goals. Patient continues to be limited by significant and fluctuating pain, hypersensitivity to touch (LE>UE), confusion, impaired sitting balance (strong posterior lean) and cognition, poor activity tolerance/endurance, and global debility noted during EOB targeting activity and dependent toileting. Of note, patient requiring max physical and cognitive assist of 2 with significantly increased time for minimal initiation of movement at this time (largely d/t pain).  Patient not appropriate for IPR at this time; therefore, recommending SNF rehab to address safety during functional mobility and ADLs. Current Level of Function Impacting Discharge (ADLs): max to total A x2 all ADLs and mobility     Other factors to consider for discharge: lives in [facility], significant pain, unclear diagnosis, not safe to d/c home, cognition           PLAN :  Patient continues to benefit from skilled intervention to address the above impairments. Continue treatment per established plan of care. to address goals. Recommend with staff: encourage rolling for bedpan and assist with ADLs as able, bed in chair position 3x/day for all meals, yoshi to chair with 2-3 assist. Thank you. Recommend next OT session: EOB ADL, trial standing     Recommendation for discharge: (in order for the patient to meet his/her long term goals)  Therapy up to 5 days/week in SNF setting    This discharge recommendation:  Has not yet been discussed the attending provider and/or case management    IF patient discharges home will need the following DME: TBD pending progression in acute therapies        SUBJECTIVE:   Patient stated you guys have no idea how much I hurt.     OBJECTIVE DATA SUMMARY:   Cognitive/Behavioral Status:  Neurologic State: Alert;Confused  Orientation Level: Oriented to place;Oriented to situation;Oriented to person  Cognition: Decreased attention/concentration;Decreased command following; Impaired decision making;Poor safety awareness  Perception: Appears intact  Perseveration: Perseverates during mobility  Safety/Judgement: Decreased awareness of need for assistance;Decreased awareness of need for safety;Decreased insight into deficits; Fall prevention;Home safety    Functional Mobility and Transfers for ADLs:  Bed Mobility:  Rolling: Maximum assistance;Assist x2  Supine to Sit: Maximum assistance;Assist x2  Sit to Supine: Maximum assistance; Other (comment)(Assist x3)  Scooting: Maximum assistance;Assist x2    Balance:  Sitting: Impaired; Without support  Sitting - Static: Poor (constant support)  Sitting - Dynamic: Poor (constant support)    ADL Intervention:   Patient demonstrated strong posterior lean during EOB activity, requiring max A x1-2 for anterior weight shifting. Patient demonstrated functional reach in all 4 quadrants, crossing midline, and fine grasp of objects with bilateral UEs; requiring max A for EOB sitting balance and max discrete verbal cues for sequencing and initiation, limited by pain and fatigue. Noted difficulty with RUE> LUE ROM and coordination. Toileting  Toileting Assistance: Total assistance(dependent)  Bladder Hygiene: Total assistance (dependent)  Bowel Hygiene: Total assistance (dependent)  Clothing Management: Total assistance (dependent)  Cues: Verbal cues provided; Tactile cues provided;Visual cues provided    Cognitive Retraining  Problem Solving: Awareness of environment; Identifying the problem;General alternative solution; Identifying the task  Executive Functions: Executing cognitive plans  Organizing/Sequencing: Breaking task down  Attention to Task: Distractibility; Single task  Maintains Attention For (Time): 30 seconds  Following Commands: Follows one step commands/directions; Awareness of environment(followed approximately 50% of commands inconsistently )  Safety/Judgement: Decreased awareness of need for assistance;Decreased awareness of need for safety;Decreased insight into deficits; Fall prevention;Home safety  Cues: Tactile cues provided;Verbal cues provided;Visual cues provided    Pain:  Significant pain bilateral LE and UE (LUE>RUE) pain, fluctuating, limiting participation     Activity Tolerance:   Fair    After treatment patient left in no apparent distress:   Supine in bed, Heels elevated for pressure relief, Call bell within reach, Bed / chair alarm activated, and Side rails x 3    COMMUNICATION/COLLABORATION:   The patients plan of care was discussed with: Physical therapist and Registered nurse.      Patient was educated regarding His deficit(s) of impaired balance, pain, weakness as this relates to His diagnosis of acute encephalopathy. He demonstrated poor understanding as evidenced by impaired cognition/verbalization. Patient not appropriate for [facility] education at this time. Will follow up when appropriate. Regarding student involvement in patient care:  A student participated in this treatment session. Per CMS Medicare statements and AOTA guidelines I certify that the following was true:  1. I was present and directly observed the entire session. 2. I made all skilled judgments and clinical decisions regarding care. 3. I am the practitioner responsible for assessment, treatment, and documentation.     LUZ MARIA Moran  Time Calculation: 34 mins

## 2020-11-05 NOTE — PROGRESS NOTES
TRANSFER - OUT REPORT:    Verbal report given to RN(name) on Rex Escobar  being transferred to Neuro(unit) for routine progression of care       Report consisted of patients Situation, Background, Assessment and   Recommendations(SBAR). Information from the following report(s) SBAR, Intake/Output, MAR, Recent Results and Cardiac Rhythm SR was reviewed with the receiving nurse. Opportunity for questions and clarification was provided.       Patient transported with:   Monitor  Patient's medications from home  Registered Nurse

## 2020-11-05 NOTE — PROGRESS NOTES
ORTHO PROGRESS NOTE      SUBJECTIVE:  Lili Fees c/o mod/severe bilat ankle throbbing pain and now notes pain bilat great toe as well. Pain started about 3 days ago, after he was admitted. Reports similar pain about 4 episodes per year that he assumes is gout and self treats with home remedies such as cherries. Took colchicine once in past without relief. Family hoping to get patient to [place] soon. OBJECTIVE:  Patient Vitals for the past 24 hrs:   Temp Pulse BP   11/05/20 1012 99.7 °F (37.6 °C) 74 135/74   11/05/20 0915  80 135/74   11/05/20 0006 100.2 °F (37.9 °C) 98 (!) 154/82   11/04/20 2351 99 °F (37.2 °C) 91 (!) 178/93   11/04/20 2009 (!) 102.2 °F (39 °C) (!) 110 (!) 145/77   11/04/20 1610 99.6 °F (37.6 °C) 99 136/64       Alert, no distress. Lying in bed. Family not present but son is on phone. Respirations unlabored. RLE ankle and great toe MTP both swollen and TTP. Pain with PROM. LLE ankle and great toe MTP both swollen and TTP. Pain with PROM. Recent Labs     11/05/20  0305   HGB 9.0*   HCT 27.2*      BUN 11   CREA 0.98   GFRAA >60   GFRNA >60     WBC 15.6, RA neg, Uric acid 3.9. Ankle Xrays no fracture, + DJD. ASSESSMENT:  Given now bilat great toe MTP pain as well as bilat ankle, strongly suspect acute gouty arthropathy or other inflammatory process. PLAN:  Consider conservative treatment of gout or inflammatory process. Steroids if not contra-indicated. No plans for further intervention from Ortho perspective. DVT proph per primary team.  PT/OT WBAT BLE with device if OK with medicine. D/W Dr. Ida Palomares.     ANTHONY Justin  Orthopedic Trauma Service  [facility]

## 2020-11-05 NOTE — PROGRESS NOTES
ID Progress Note  2020    Subjective:     Having fevers. He continues to have bilateral ankle pain. He now has proximal forearm pain. No dyspnea, abdominal pain, headache or sore throat. ROS: No anaphylaxis, seizures, syncope, hematemesis, hematochezia     Objective:     Vitals:   Visit Vitals  /67 (BP 1 Location: Right arm, BP Patient Position: At rest)   Pulse 94   Temp 98.2 °F (36.8 °C)   Resp 20   Ht 6' 2\" (1.88 m)   Wt 122 kg (268 lb 15.4 oz)   SpO2 100%   BMI 34.53 kg/m²        Tmax:  Temp (24hrs), Av.9 °F (37.7 °C), Min:98.2 °F (36.8 °C), Max:102.2 °F (39 °C)      Exam:    Not in distress  Pink conjunctivae, anicteric sclerae  No cervical lymphdenopathy   Lung clear, no rales, wheezes or rhonchi   Heart: s1, s2, RRR, no murmurs rubs or clicks  Abdomen: soft nontender, no guarding or rebound  Bilateral ankles are swollen warm and tender   Left proximal/ forearm elbow area is swollen. There is an IV line there. Speech fluent     Labs:   Lab Results   Component Value Date/Time    WBC 15.6 (H) 2020 03:05 AM    HGB 9.0 (L) 2020 03:05 AM    HCT 27.2 (L) 2020 03:05 AM    PLATELET 807  03:05 AM    MCV 87.2 2020 03:05 AM     Lab Results   Component Value Date/Time    Sodium 135 (L) 2020 03:05 AM    Potassium 3.3 (L) 2020 03:05 AM    Chloride 98 2020 03:05 AM    CO2 27 2020 03:05 AM    Anion gap 10 2020 03:05 AM    Glucose 125 (H) 2020 03:05 AM    BUN 11 2020 03:05 AM    Creatinine 0.98 2020 03:05 AM    BUN/Creatinine ratio 11 (L) 2020 03:05 AM    GFR est AA >60 2020 03:05 AM    GFR est non-AA >60 2020 03:05 AM    Calcium 8.9 2020 03:05 AM    Bilirubin, total 0.7 10/31/2020 03:16 AM    Alk.  phosphatase 55 10/31/2020 03:16 AM    Protein, total 6.6 10/31/2020 03:16 AM    Albumin 3.0 (L) 10/31/2020 03:16 AM    Globulin 3.6 10/31/2020 03:16 AM    A-G Ratio 0.8 (L) 10/31/2020 03:16 AM    ALT (SGPT) 19 10/31/2020 03:16 AM       Rheumatoid factor negative   Omayra neg       Assessment:     #1 fever     #2 ankle tenderness     #3 groundglass opacities on CT - covid neg x 2, respiratory viral panel neg      #4 diabetes     #5 hypertension     #6 dyslipidemia    Recommendations: The patient continues to have fever. His main symptom is tenderness and warmth on bilateral ankles. He tells me that this happens once in a while And it has been happening for a long time now. Arthrocentesis was done but was not able to obtain any fluid. Ortho has recommended starting steroids. I am okay with this. The left forearm is swollen. There is a IV on the left elbow area. The patient may have thrombophlebitis. I have asked the nurse to take the line out. Duplex scan of the left forearm was done and it is pending. Continue abx for now.     Jaclyn Corona MD

## 2020-11-05 NOTE — PROGRESS NOTES
[facility]  Hospitalist Group                                                                                          Hospitalist Progress Note  Wanda Cedillo NP  Answering service: [phone] from in house phone        Date of Service:  2020  NAME:  Nafisa Finley  :  1950  MRN:  [id number]      Admission Summary: This is a 24-year-old man with a past medical history significant for type 2 diabetes, hypertension, dyslipidemia, was in his usual state of health until the day of his presentation at the emergency room when the patient developed a change in mental status. The patient reported to his dentist's office and underwent a number of tooth extractions. The procedure finishing at about 10:00 a.m. The patient developed a change in mental status after the procedure. He was monitored at the dentist's office without any improvement in his mental status. The patient was at baseline before the procedure according to report. The patient was also found to be hypoxic with oxygen saturation of 88%. The oxygen saturation improved with supplemental oxygen. Because of the persistent change in mental status, the patient was sent to the emergency room at the [facility] Emergency Room in [place]. The patient has no history of congestive heart failure or respiratory disease. There was no sick contact or contact with any person with COVID-19 virus infection. When the patient arrived at the emergency room, the patient was found to have a fever of 101.9, significant leukocytosis, and elevated lactic acid level. Lumbar puncture was performed. The patient received antibiotics and was referred to the hospitalist service for evaluation for admission. CT scan of the head done on arrival at the emergency room did not show any acute intracranial abnormalities.   No record of prior admission to this hospital.       Interval history / Subjective:   Pt seen in bed in NAD. He reports that he feels better. Discussed recurrent fever spikes and increasing WBC count as well as possible dental abscess seen on imaging. Pt LUE is extremely swollen and he states this has been this way for \"a while now. \" He endorses numbness to arm/hand. Pulse is 2+ in radial.    Spoke with patient's son regarding possible transfer to hospital in [place], which is what they prefer. CM to call with transportation pricing. If family agrees, will initiate transfer in AM to either [facility] or [facility] in [place], [place]. Assessment & Plan:     Acute delirium. metabolic encephalopathy resolved   opiates related ? From dental procedure drug screen positive   - CT head negative   -Patient may have obstructive sleep apnea ABG showed some retention of CO2 placed on CPAP now more clear     Acute respiratory failure with hypoxia. resolved   - pt seen breathing normally in RA while examined  - CT chest reviewed     Type 2 diabetes with hyperglycemia. A1c 7.5  -Cont AC/HS accuchecks w/ SSI  -trend BS    Sepsis. Unclear etiology may be mandibular abscess seen in CT maxillofacial   -The diagnosis of sepsis is supported by elevated lactic acid level, fever, and significant leukocytosis. - Patient on vancomycin and Zosyn. LP shows normal CSF  -Ortho attempted to aspirate ankle joint fluid 11/4 unsuccessfully  - All CT reviewed  Still having fever ID consulted  - COVID test neg x2  -Appreciate ID input     Hypertension. Stable, cont amlodipine, cozaar; monitor BP    Dyslipidemia. cont preadmission medication.     Hypokalemia - replete and repeat lab in AM     Code status: Full  DVT prophylaxis: scd    Care Plan discussed with: Patient/Family and Nurse  Anticipated Disposition: Home w/Family  Anticipated Discharge: 24 hours to 48 hours   Cussed with patient's son over the phone called dental surgeon and discussed the case as well     Hospital Problems  Date Reviewed: 10/31/2020          Codes Class Noted POA Encephalopathy ICD-10-CM: G93.40  ICD-9-CM: 348.30  11/1/2020 Unknown        * (Principal) Acute delirium ICD-10-CM: R41.0  ICD-9-CM: 780.09  10/30/2020 Yes                Review of Systems:   A comprehensive review of systems was negative. Xr Chest Sngl V    Result Date: 10/30/2020  IMPRESSION: Mild central vascular fullness and interstitial prominence. Poor inspiratory effort. No focal infiltrate. .  . Xr Ankle Lt Ap/lat    Result Date: 11/4/2020  IMPRESSION: 1. Soft tissue swelling with ankle effusion. Degenerative changes of the midfoot and chronic loose bodies distal to the medial malleolus. Xr Ankle Rt Ap/lat    Result Date: 11/4/2020  IMPRESSION: 1. Possible tibiotalar joint effusion. 2. Mild diffuse swelling of the right foot/ankle. 3. Mild chronic degenerative change. 4. No acute osseous abnormality demonstrated. Ct Head Wo Cont    Result Date: 10/30/2020  IMPRESSION: 1. No acute intracranial abnormality. 2. Microvascular ischemic, possible old ischemic and age-related change greater than expected for stated age. 3. Mild sinusitis. Ct Maxillofacial Wo Cont    Result Date: 11/1/2020  IMPRESSION: No fracture. Possible small right mandibular periapical dental abscesses. Cta Chest W Or W Wo Cont    Result Date: 11/1/2020  IMPRESSION: 1. No pulmonary emboli. 2. Groundglass airspace disease, nonspecific. Ct Abd Pelv W Cont    Result Date: 11/1/2020  IMPRESSION: No Acute Disease. Vital Signs:    Last 24hrs VS reviewed since prior progress note.  Most recent are:  Visit Vitals  /74 (BP 1 Location: Right arm, BP Patient Position: At rest)   Pulse 74   Temp 99.7 °F (37.6 °C)   Resp 17   Ht 6' 2\" (1.88 m)   Wt 122 kg (268 lb 15.4 oz)   SpO2 97%   BMI 34.53 kg/m²         Intake/Output Summary (Last 24 hours) at 11/5/2020 1332  Last data filed at 11/5/2020 1011  Gross per 24 hour   Intake    Output 100 ml   Net -100 ml        Physical Examination:             Constitutional:  No acute distress, cooperative, pleasant    ENT:  Oral mucosa moist, oropharynx benign. Resp:  CTA bilaterally. No wheezing/rhonchi/rales. No accessory muscle use   CV:  Regular rhythm, normal rate, no murmurs, gallops, rubs    GI:  Soft, non distended, non tender. normoactive bowel sounds, no hepatosplenomegaly     Musculoskeletal:  Warm, 2+ pulses throughout; PREM edema; Junior feet edematous    Neurologic:  Moves all extremities. AAOx3, CN II-XII reviewed     Psych:  Good insight, Not anxious nor agitated. Data Review:    I personally reviewed  Image and labs      Labs:     Recent Labs     11/05/20 0305 11/04/20 0515   WBC 15.6* 15.8*   HGB 9.0* 10.2*   HCT 27.2* 31.4*    249     Recent Labs     11/05/20 0305 11/04/20 0515 11/03/20  0412   * 133* 132*   K 3.3* 3.3* 3.2*   CL 98 96* 96*   CO2 27 28 27   BUN 11 8 7   CREA 0.98 1.03 1.03   * 142* 160*   CA 8.9 9.1 9.2   URICA  --  3.9  --      No results for input(s): ALT, AP, TBIL, TBILI, TP, ALB, GLOB, GGT, AML, LPSE in the last 72 hours. No lab exists for component: SGOT, GPT, AMYP, HLPSE  No results for input(s): INR, PTP, APTT, INREXT, INREXT in the last 72 hours. No results for input(s): FE, TIBC, PSAT, FERR in the last 72 hours. No results found for: FOL, RBCF   No results for input(s): PH, PCO2, PO2 in the last 72 hours. No results for input(s): CPK, CKNDX, TROIQ in the last 72 hours.     No lab exists for component: CPKMB  No results found for: CHOL, CHOLX, CHLST, CHOLV, HDL, HDLP, LDL, LDLC, DLDLP, TGLX, TRIGL, TRIGP, CHHD, CHHDX  Lab Results   Component Value Date/Time    Glucose (POC) 139 (H) 11/05/2020 11:14 AM    Glucose (POC) 139 (H) 11/05/2020 07:54 AM    Glucose (POC) 156 (H) 11/04/2020 08:59 PM    Glucose (POC) 166 (H) 11/04/2020 04:15 PM    Glucose (POC) 168 (H) 11/04/2020 12:03 PM     Lab Results   Component Value Date/Time    Color YELLOW/STRAW 10/31/2020 03:16 AM    Appearance CLEAR 10/31/2020 03:16 AM    Specific gravity 1.030 10/31/2020 03:16 AM    Specific gravity PATIENT DISCHARGED BEFORE SAMPLE COLLECTED 10/30/2020 04:15 PM    pH (UA) 5.0 10/31/2020 03:16 AM    Protein Negative 10/31/2020 03:16 AM    Glucose Negative 10/31/2020 03:16 AM    Ketone Negative 10/31/2020 03:16 AM    Bilirubin Negative 10/31/2020 03:16 AM    Urobilinogen 0.2 10/31/2020 03:16 AM    Nitrites Negative 10/31/2020 03:16 AM    Leukocyte Esterase Negative 10/31/2020 03:16 AM    Epithelial cells PATIENT DISCHARGED BEFORE SAMPLE COLLECTED 10/30/2020 04:15 PM    Bacteria PATIENT DISCHARGED BEFORE SAMPLE COLLECTED 10/30/2020 04:15 PM    WBC PATIENT DISCHARGED BEFORE SAMPLE COLLECTED 10/30/2020 04:15 PM    RBC PATIENT DISCHARGED BEFORE SAMPLE COLLECTED 10/30/2020 04:15 PM         Medications Reviewed:     Current Facility-Administered Medications   Medication Dose Route Frequency    potassium chloride SR (KLOR-CON 10) tablet 40 mEq  40 mEq Oral NOW    amLODIPine (NORVASC) tablet 5 mg  5 mg Oral DAILY    And    losartan (COZAAR) tablet 100 mg  100 mg Oral DAILY    levoFLOXacin (LEVAQUIN) 500 mg in D5W IVPB  500 mg IntraVENous Q24H    sodium chloride (NS) flush 5-10 mL  5-10 mL IntraVENous PRN    sodium chloride (NS) flush 5-40 mL  5-40 mL IntraVENous Q8H    sodium chloride (NS) flush 5-40 mL  5-40 mL IntraVENous PRN    acetaminophen (TYLENOL) tablet 650 mg  650 mg Oral Q6H PRN    Or    acetaminophen (TYLENOL) suppository 650 mg  650 mg Rectal Q6H PRN    polyethylene glycol (MIRALAX) packet 17 g  17 g Oral DAILY PRN    promethazine (PHENERGAN) tablet 12.5 mg  12.5 mg Oral Q6H PRN    Or    ondansetron (ZOFRAN) injection 4 mg  4 mg IntraVENous Q6H PRN    piperacillin-tazobactam (ZOSYN) 3.375 g in 0.9% sodium chloride (MBP/ADV) 100 mL  3.375 g IntraVENous Q8H    insulin lispro (HUMALOG) injection   SubCUTAneous AC&HS    glucose chewable tablet 16 g  4 Tab Oral PRN    glucagon (GLUCAGEN) injection 1 mg  1 mg IntraMUSCular PRN    rosuvastatin (CRESTOR) tablet 10 mg  10 mg Oral QHS    dextrose 10 % infusion 125-250 mL  125-250 mL IntraVENous PRN    lactated Ringers infusion  50 mL/hr IntraVENous CONTINUOUS     ______________________________________________________________________  EXPECTED LENGTH OF STAY: 4d 19h  ACTUAL LENGTH OF STAY:          [address] Adrianne Masters NP

## 2020-11-05 NOTE — PROGRESS NOTES
Problem: Mobility Impaired (Adult and Pediatric)  Goal: *Acute Goals and Plan of Care (Insert Text)  Description: FUNCTIONAL STATUS PRIOR TO ADMISSION: Patient was modified independent using a single point cane for functional mobility. HOME SUPPORT PRIOR TO ADMISSION: The patient lived with wife, but he did not require assist.    Physical Therapy Goals  Initiated 11/4/2020  1. Patient will move from supine to sit and sit to supine  and roll side to side in bed with modified independence within 7 day(s). 2.  Patient will transfer from bed to chair and chair to bed with modified independence using the least restrictive device within 7 day(s). 3.  Patient will perform sit to stand with supervision/set-up within 7 day(s). 4.  Patient will ambulate with supervision/set-up for 100 feet with the least restrictive device within 7 day(s). 5.  Patient will ascend/descend 4 stairs with 1 handrail(s) with supervision/set-up within 7 day(s). Outcome: Progressing Towards Goal   PHYSICAL THERAPY TREATMENT  Patient: Gold Monday (68 y.o. male)  Date: 11/5/2020  Diagnosis: Acute delirium [R41.0]  Encephalopathy [G93.40]   Acute delirium       Precautions: Fall  Chart, physical therapy assessment, plan of care and goals were reviewed. ASSESSMENT  Patient continues with skilled PT services and is not progressing towards goals. Pt received supine in bed and agreeable to therapy with encouragement. Pt tolerated session fairly. Pt demonstrated delayed processing, poor motor planning and initiation of movements, decreased strength and functional mobility, impaired seated balance and tolerance to activity, LE pain and hypersensitivity to touch, and lack of motivation. Pt required max A x 2 supine to sit EOB. Pt deferred recommendations made by therapists to for ease of mobility and preferred to perform actions his own way. Pt with poor sitting balance requiring constant assist at posterior trunk.  Pt was able to initiate anterior trunk leaning with max verbal cues and physical assist. Pt returned to supine position with total assistance x 3 person. Pt is functioning well below baseline mobility status and at this time is most appropriate for SNF placement. .     Current Level of Function Impacting Discharge (mobility/balance): max-total A x 2-3 person for supine<>sit, poor sitting balance    Other factors to consider for discharge: previously mod I         PLAN :  Patient continues to benefit from skilled intervention to address the above impairments. Continue treatment per established plan of care. to address goals. Recommendation for discharge: (in order for the patient to meet his/her long term goals)  Therapy up to 5 days/week in SNF setting    This discharge recommendation:  Has been made in collaboration with the attending provider and/or case management    IF patient discharges home will need the following DME: total care       SUBJECTIVE:   Patient stated This might take awhile.     OBJECTIVE DATA SUMMARY:   Critical Behavior:  Neurologic State: Alert, Confused  Orientation Level: Oriented to place, Oriented to situation, Oriented to person  Cognition: Decreased attention/concentration, Decreased command following, Impaired decision making, Poor safety awareness  Safety/Judgement: Decreased awareness of need for assistance, Decreased awareness of need for safety, Decreased insight into deficits, Fall prevention, Home safety  Functional Mobility Training:  Bed Mobility:  Rolling: Maximum assistance;Assist x2  Supine to Sit: Maximum assistance;Assist x2  Sit to Supine: Maximum assistance; Other (comment)(Assist x3)/Total A x 3  Scooting: Maximum assistance;Assist x2        Transfers:                Balance:  Sitting: Impaired; Without support  Sitting - Static: Poor (constant support)  Sitting - Dynamic: Poor (constant support)  Ambulation/Gait Training:            Therapeutic Exercises:     Pain Rating:  Pt c/o hypersensitivity to bilateral LEs    Activity Tolerance:   Poor    After treatment patient left in no apparent distress:   Supine in bed, Call bell within reach, Bed / chair alarm activated, and Side rails x 3    COMMUNICATION/COLLABORATION:   The patients plan of care was discussed with: Occupational therapist and Registered nurse.      Dania Teixeira, PT, DPT   Time Calculation: 36 mins

## 2020-11-05 NOTE — PROGRESS NOTES
Bedside shift change report given to Patti Rocha RN (oncoming nurse) by Frieda Rene RN (offgoing nurse).  Report included the following information SBAR, Intake/Output, MAR and Cardiac Rhythm SR.

## 2020-11-05 NOTE — PROGRESS NOTES
Report received from [facility]. Pt arrived to unit via bed with Zosyn infusing. No complaints of pain.

## 2020-11-06 LAB
ANION GAP SERPL CALC-SCNC: 8 MMOL/L (ref 5–15)
BASOPHILS # BLD: 0.1 K/UL (ref 0–0.1)
BASOPHILS NFR BLD: 0 % (ref 0–1)
BUN SERPL-MCNC: 12 MG/DL (ref 6–20)
BUN/CREAT SERPL: 14 (ref 12–20)
CALCIUM SERPL-MCNC: 9.2 MG/DL (ref 8.5–10.1)
CCP IGA+IGG SERPL IA-ACNC: 6 UNITS (ref 0–19)
CHLORIDE SERPL-SCNC: 99 MMOL/L (ref 97–108)
CO2 SERPL-SCNC: 27 MMOL/L (ref 21–32)
CREAT SERPL-MCNC: 0.87 MG/DL (ref 0.7–1.3)
DIFFERENTIAL METHOD BLD: ABNORMAL
EOSINOPHIL # BLD: 0 K/UL (ref 0–0.4)
EOSINOPHIL NFR BLD: 0 % (ref 0–7)
ERYTHROCYTE [DISTWIDTH] IN BLOOD BY AUTOMATED COUNT: 13.2 % (ref 11.5–14.5)
GLUCOSE BLD STRIP.AUTO-MCNC: 133 MG/DL (ref 65–100)
GLUCOSE BLD STRIP.AUTO-MCNC: 148 MG/DL (ref 65–100)
GLUCOSE BLD STRIP.AUTO-MCNC: 152 MG/DL (ref 65–100)
GLUCOSE BLD STRIP.AUTO-MCNC: 153 MG/DL (ref 65–100)
GLUCOSE SERPL-MCNC: 146 MG/DL (ref 65–100)
HCT VFR BLD AUTO: 26 % (ref 36.6–50.3)
HGB BLD-MCNC: 8.5 G/DL (ref 12.1–17)
IMM GRANULOCYTES # BLD AUTO: 0.1 K/UL (ref 0–0.04)
IMM GRANULOCYTES NFR BLD AUTO: 1 % (ref 0–0.5)
LYMPHOCYTES # BLD: 1.5 K/UL (ref 0.8–3.5)
LYMPHOCYTES NFR BLD: 11 % (ref 12–49)
MCH RBC QN AUTO: 28.2 PG (ref 26–34)
MCHC RBC AUTO-ENTMCNC: 32.7 G/DL (ref 30–36.5)
MCV RBC AUTO: 86.4 FL (ref 80–99)
MONOCYTES # BLD: 1.3 K/UL (ref 0–1)
MONOCYTES NFR BLD: 9 % (ref 5–13)
NEUTS SEG # BLD: 10.5 K/UL (ref 1.8–8)
NEUTS SEG NFR BLD: 79 % (ref 32–75)
NRBC # BLD: 0 K/UL (ref 0–0.01)
NRBC BLD-RTO: 0 PER 100 WBC
PLATELET # BLD AUTO: 307 K/UL (ref 150–400)
PMV BLD AUTO: 10.2 FL (ref 8.9–12.9)
POTASSIUM SERPL-SCNC: 3.4 MMOL/L (ref 3.5–5.1)
RBC # BLD AUTO: 3.01 M/UL (ref 4.1–5.7)
SERVICE CMNT-IMP: ABNORMAL
SODIUM SERPL-SCNC: 134 MMOL/L (ref 136–145)
WBC # BLD AUTO: 13.4 K/UL (ref 4.1–11.1)

## 2020-11-06 PROCEDURE — 74011636637 HC RX REV CODE- 636/637: Performed by: INTERNAL MEDICINE

## 2020-11-06 PROCEDURE — 85025 COMPLETE CBC W/AUTO DIFF WBC: CPT

## 2020-11-06 PROCEDURE — 74011000258 HC RX REV CODE- 258: Performed by: INTERNAL MEDICINE

## 2020-11-06 PROCEDURE — 74011250636 HC RX REV CODE- 250/636: Performed by: NURSE PRACTITIONER

## 2020-11-06 PROCEDURE — 74011250636 HC RX REV CODE- 250/636: Performed by: INTERNAL MEDICINE

## 2020-11-06 PROCEDURE — 74011250637 HC RX REV CODE- 250/637: Performed by: HOSPITALIST

## 2020-11-06 PROCEDURE — 36415 COLL VENOUS BLD VENIPUNCTURE: CPT

## 2020-11-06 PROCEDURE — 74011250636 HC RX REV CODE- 250/636: Performed by: HOSPITALIST

## 2020-11-06 PROCEDURE — 80048 BASIC METABOLIC PNL TOTAL CA: CPT

## 2020-11-06 PROCEDURE — 65660000000 HC RM CCU STEPDOWN

## 2020-11-06 PROCEDURE — 82962 GLUCOSE BLOOD TEST: CPT

## 2020-11-06 PROCEDURE — 74011250637 HC RX REV CODE- 250/637: Performed by: INTERNAL MEDICINE

## 2020-11-06 RX ORDER — HEPARIN SODIUM 5000 [USP'U]/ML
5000 INJECTION, SOLUTION INTRAVENOUS; SUBCUTANEOUS EVERY 8 HOURS
Qty: 1 ML | Refills: 0 | Status: SHIPPED | OUTPATIENT
Start: 2020-11-06 | End: 2020-11-10

## 2020-11-06 RX ORDER — PROMETHAZINE HYDROCHLORIDE 12.5 MG/1
12.5 TABLET ORAL
Qty: 1 TAB | Refills: 0 | Status: SHIPPED | OUTPATIENT
Start: 2020-11-06 | End: 2020-11-10

## 2020-11-06 RX ORDER — LOSARTAN POTASSIUM 100 MG/1
100 TABLET ORAL DAILY
Qty: 1 TAB | Refills: 0 | Status: SHIPPED | OUTPATIENT
Start: 2020-11-07 | End: 2020-11-10

## 2020-11-06 RX ORDER — ONDANSETRON 2 MG/ML
4 INJECTION INTRAMUSCULAR; INTRAVENOUS
Qty: 1 ML | Refills: 0 | Status: SHIPPED | OUTPATIENT
Start: 2020-11-06 | End: 2020-11-10

## 2020-11-06 RX ORDER — MAGNESIUM SULFATE 100 %
4 CRYSTALS MISCELLANEOUS AS NEEDED
Qty: 1 TAB | Refills: 0 | Status: SHIPPED | OUTPATIENT
Start: 2020-11-06 | End: 2020-11-10

## 2020-11-06 RX ORDER — POLYETHYLENE GLYCOL 3350 17 G/17G
17 POWDER, FOR SOLUTION ORAL DAILY
Qty: 1 EACH | Refills: 0 | Status: SHIPPED | OUTPATIENT
Start: 2020-11-06 | End: 2020-11-10

## 2020-11-06 RX ORDER — SODIUM CHLORIDE, SODIUM LACTATE, POTASSIUM CHLORIDE, CALCIUM CHLORIDE 600; 310; 30; 20 MG/100ML; MG/100ML; MG/100ML; MG/100ML
50 INJECTION, SOLUTION INTRAVENOUS CONTINUOUS
Qty: 1 ML | Refills: 0 | Status: SHIPPED | OUTPATIENT
Start: 2020-11-06 | End: 2020-11-10

## 2020-11-06 RX ORDER — AMLODIPINE BESYLATE 5 MG/1
5 TABLET ORAL DAILY
Qty: 1 TAB | Refills: 0 | Status: SHIPPED
Start: 2020-11-07 | End: 2020-11-10

## 2020-11-06 RX ORDER — ACETAMINOPHEN 325 MG/1
650 TABLET ORAL
Qty: 1 TAB | Refills: 0 | Status: SHIPPED
Start: 2020-11-06 | End: 2020-11-10

## 2020-11-06 RX ORDER — INSULIN LISPRO 100 [IU]/ML
INJECTION, SOLUTION INTRAVENOUS; SUBCUTANEOUS
Qty: 1 VIAL | Refills: 0 | Status: SHIPPED
Start: 2020-11-06 | End: 2020-11-10

## 2020-11-06 RX ORDER — LEVOFLOXACIN 5 MG/ML
500 INJECTION, SOLUTION INTRAVENOUS EVERY 24 HOURS
Qty: 1 ML | Refills: 0 | Status: SHIPPED | OUTPATIENT
Start: 2020-11-06 | End: 2020-11-10

## 2020-11-06 RX ADMIN — HEPARIN SODIUM 5000 UNITS: 5000 INJECTION INTRAVENOUS; SUBCUTANEOUS at 22:05

## 2020-11-06 RX ADMIN — ACETAMINOPHEN 650 MG: 325 TABLET ORAL at 14:54

## 2020-11-06 RX ADMIN — INSULIN LISPRO 2 UNITS: 100 INJECTION, SOLUTION INTRAVENOUS; SUBCUTANEOUS at 08:47

## 2020-11-06 RX ADMIN — Medication 10 ML: at 22:06

## 2020-11-06 RX ADMIN — Medication 10 ML: at 14:54

## 2020-11-06 RX ADMIN — Medication 10 ML: at 06:11

## 2020-11-06 RX ADMIN — INSULIN LISPRO 2 UNITS: 100 INJECTION, SOLUTION INTRAVENOUS; SUBCUTANEOUS at 11:30

## 2020-11-06 RX ADMIN — SODIUM CHLORIDE, SODIUM LACTATE, POTASSIUM CHLORIDE, AND CALCIUM CHLORIDE 50 ML/HR: 600; 310; 30; 20 INJECTION, SOLUTION INTRAVENOUS at 15:58

## 2020-11-06 RX ADMIN — PIPERACILLIN AND TAZOBACTAM 3.38 G: 3; .375 INJECTION, POWDER, LYOPHILIZED, FOR SOLUTION INTRAVENOUS at 06:06

## 2020-11-06 RX ADMIN — PIPERACILLIN AND TAZOBACTAM 3.38 G: 3; .375 INJECTION, POWDER, LYOPHILIZED, FOR SOLUTION INTRAVENOUS at 22:05

## 2020-11-06 RX ADMIN — ROSUVASTATIN CALCIUM 10 MG: 10 TABLET, FILM COATED ORAL at 22:05

## 2020-11-06 RX ADMIN — INSULIN LISPRO 2 UNITS: 100 INJECTION, SOLUTION INTRAVENOUS; SUBCUTANEOUS at 17:55

## 2020-11-06 RX ADMIN — AMLODIPINE BESYLATE 5 MG: 5 TABLET ORAL at 08:47

## 2020-11-06 RX ADMIN — HEPARIN SODIUM 5000 UNITS: 5000 INJECTION INTRAVENOUS; SUBCUTANEOUS at 06:11

## 2020-11-06 RX ADMIN — LEVOFLOXACIN 500 MG: 5 INJECTION, SOLUTION INTRAVENOUS at 18:00

## 2020-11-06 RX ADMIN — ACETAMINOPHEN 650 MG: 325 TABLET ORAL at 03:20

## 2020-11-06 RX ADMIN — LOSARTAN POTASSIUM 100 MG: 50 TABLET, FILM COATED ORAL at 08:48

## 2020-11-06 RX ADMIN — PIPERACILLIN AND TAZOBACTAM 3.38 G: 3; .375 INJECTION, POWDER, LYOPHILIZED, FOR SOLUTION INTRAVENOUS at 15:57

## 2020-11-06 RX ADMIN — HEPARIN SODIUM 5000 UNITS: 5000 INJECTION INTRAVENOUS; SUBCUTANEOUS at 14:54

## 2020-11-06 NOTE — PROGRESS NOTES
Problem: Risk for Spread of Infection  Goal: Prevent transmission of infectious organism to others  Description: Prevent the transmission of infectious organisms to other patients, staff members, and visitors. Outcome: Progressing Towards Goal     Problem: Falls - Risk of  Goal: *Absence of Falls  Description: Document Natalisharlene Godfrey Fall Risk and appropriate interventions in the flowsheet. Outcome: Progressing Towards Goal  Note: Fall Risk Interventions:  Mobility Interventions: Bed/chair exit alarm, Communicate number of staff needed for ambulation/transfer, Patient to call before getting OOB    Mentation Interventions: Adequate sleep, hydration, pain control, Bed/chair exit alarm, Evaluate medications/consider consulting pharmacy, Reorient patient    Medication Interventions: Bed/chair exit alarm, Evaluate medications/consider consulting pharmacy, Patient to call before getting OOB    Elimination Interventions: Bed/chair exit alarm, Call light in reach, Patient to call for help with toileting needs              Problem: Pressure Injury - Risk of  Goal: *Prevention of pressure injury  Description: Document Sulaiman Scale and appropriate interventions in the flowsheet.   Outcome: Progressing Towards Goal  Note: Pressure Injury Interventions:  Sensory Interventions: Assess changes in LOC    Moisture Interventions: Absorbent underpads, Apply protective barrier, creams and emollients, Check for incontinence Q2 hours and as needed, Limit adult briefs    Activity Interventions: Assess need for specialty bed    Mobility Interventions: HOB 30 degrees or less, Pressure redistribution bed/mattress (bed type), PT/OT evaluation    Nutrition Interventions: Document food/fluid/supplement intake    Friction and Shear Interventions: Minimize layers                Problem: Breathing Pattern - Ineffective  Goal: *Absence of hypoxia  Outcome: Progressing Towards Goal     Problem: Diabetes Self-Management  Goal: *Prevention, detection, treatment of acute complications  Description: List symptoms of hyper- and hypoglycemia; describe how to treat low blood sugar and actions for lowering  high blood glucose level.   Outcome: Progressing Towards Goal     Problem: Sepsis: Day 3  Goal: *Vital sign stability  Outcome: Progressing Towards Goal  Goal: Activity/Safety  Outcome: Progressing Towards Goal  Goal: Diagnostic Test/Procedures  Outcome: Progressing Towards Goal  Goal: Medications  Outcome: Progressing Towards Goal  Goal: Respiratory  Outcome: Progressing Towards Goal  Goal: Treatments/Interventions/Procedures  Outcome: Progressing Towards Goal     Problem: Delirium Treatment  Goal: *Absence of falls  Outcome: Progressing Towards Goal

## 2020-11-06 NOTE — PROGRESS NOTES
NUTRITION  Pt seen for ONS           RECOMMENDATIONS:   Recommend continue Dental Soft diet  Added Glucerna BID    SUBJECTIVE/OBJECTIVE:   Information obtained from:   Chart/staff        Admitted with AMS, hypoxia. PMHx: DM, HTN, DLD. Developed AMS post tooth extractions. Noted pt trying to transfer [place] to hospital closer to home. No weight hx. Pt PO has been poor since admission. Added Glucerna BID for hx of DM and need for increased calories since food intake is low. Will do full assessment if pt does not transfer over the weekend. Diet:  Dental Soft  Supplements: Consult- added Glucerna BID  Intake: Poor.      Patient Vitals for the past 100 hrs:   % Diet Eaten   11/06/20 1325 25 %   11/03/20 1912 0 %   11/03/20 1200 15 %   11/03/20 0800 0 %   11/02/20 1600 50 %       Weight Changes:     [x]            Stable  Wt Readings from Last 10 Encounters:   11/06/20 122.8 kg (270 lb 12.8 oz)       Nutrition Problems Identified:    [x]          Increased pro/kcal needs             [x]          Difficulty chewing      PLAN:      [x]   Continue current diet  [x]           Add Supplements  [x]          Rescreen      Danielle Jarvis, RD

## 2020-11-06 NOTE — PROGRESS NOTES
Bedside and Verbal shift change report given to [address] [place] [address] (oncoming nurse) by Leonides Martínez (offgoing nurse). Report included the following information {SBAR REPORTS GORU:[id number]}.

## 2020-11-06 NOTE — PROGRESS NOTES
CHIN:  1. Transfer to Hospital in [place]  2. Transport via [facility] once accepted       CM spoke with [employer] via phone. Request for transfer to [facility] or [facility] submitted. Access center will begin search for accepting MD.    CM called and spoke with patient's wife, Virgina Brunner via phone and updated her on process to transfer to [facility].  Wife confirmed receipt of information on self-pay for transport. CM updated attending on process. Awaiting accepting physician in [place].     [phone]:  CM placed called to patient's insurance provider phone # [phone] and spoke with Mecca Mason in Care Management. Javi verified [employer] information and stated they do not require prior [facility] for ambulance transport. CM informed [facility] liaison and placed call to patient's son's Ji Harry, phone # [phone] and informed him of conversation with patient's insurance provider. CM place call to [employer] [phone] to inquire on status of request for transfer to [place], [address] [facility] spoke with Jeremy Quinones and they are still working with [facility] and are awaiting accepting physician. 1430: CM spoke with [facility] liaison and family will need to pay 1/2 up front for transport. Family will need to call [employer] phone # [phone] and ask for supervisor to pay over the phone. Pt is currently on Will Call status with [facility] for the transportation. 1438: CM called and spoke with patient's son, Ji Harry phone # [phone] and gave him update from [employer]- as they are still working on an accepting physician at [facility]. He was informed that he would need to call [facility] and pay 1/2 up front for transport and then seek reimbursement from patient's insurance provider. Son verbalized understanding and is agreement with the plan. Wife was also present on the call and is agreement with the plan.      Alexei Patel, JONON  [address] [place] [address]  Coordinator of Care Management  [phone]

## 2020-11-06 NOTE — PROGRESS NOTES
[facility] and gave basic vitals and most recent labs for this patient in preparation for early morning discharge tomorrow. Also spoke with Ashley Tariq from [place] [address] and confirmed [facility] will be transporting the patient and pickup time is 0800. RN should call report to [phone]. Patient going to [address] [place] [address], Room 3048.

## 2020-11-06 NOTE — PROGRESS NOTES
CHIN:  RUR: 10%  Disposition:  Transfer to hospital in patient's hometown [facility]). CM spoke with patient's wife. She has agreed to make financial arrangements with [facility] (deposit $881.42). CM provided her with information and phone number for [facility] [phone]. Wife to request to speak with a lead or supervisor for financial arrangements. CM provided wife with name/phone number of CM going forward    CM spoke with Moses PAYAN and gave hand-off. JAY spoke with Albina Coto ([facility] ). Transportation request being made via [employer] to [facility].  (Note:  Hospital location can be changed if needed). This will hold  Reservation for 24hr notice of transport and allows time to get crew together.     Johnny Parisi, [facility]

## 2020-11-06 NOTE — INTERDISCIPLINARY ROUNDS
During interdisciplinary rounds at 0930. Care management, physical therapy, and nursing discussed discharge and plan of care. See clinical pathway and/or care plan for interventions and desired outcomes. Statement Selected

## 2020-11-07 ENCOUNTER — APPOINTMENT (OUTPATIENT)
Dept: GENERAL RADIOLOGY | Age: 70
DRG: 871 | End: 2020-11-07
Attending: INTERNAL MEDICINE
Payer: MEDICARE

## 2020-11-07 LAB
ANION GAP SERPL CALC-SCNC: 11 MMOL/L (ref 5–15)
BACTERIA SPEC CULT: NORMAL
BACTERIA SPEC CULT: NORMAL
BASOPHILS # BLD: 0.1 K/UL (ref 0–0.1)
BASOPHILS NFR BLD: 0 % (ref 0–1)
BUN SERPL-MCNC: 10 MG/DL (ref 6–20)
BUN/CREAT SERPL: 11 (ref 12–20)
CALCIUM SERPL-MCNC: 9 MG/DL (ref 8.5–10.1)
CHLORIDE SERPL-SCNC: 97 MMOL/L (ref 97–108)
CO2 SERPL-SCNC: 26 MMOL/L (ref 21–32)
CREAT SERPL-MCNC: 0.9 MG/DL (ref 0.7–1.3)
DIFFERENTIAL METHOD BLD: ABNORMAL
EOSINOPHIL # BLD: 0.1 K/UL (ref 0–0.4)
EOSINOPHIL NFR BLD: 1 % (ref 0–7)
ERYTHROCYTE [DISTWIDTH] IN BLOOD BY AUTOMATED COUNT: 12.9 % (ref 11.5–14.5)
GLUCOSE BLD STRIP.AUTO-MCNC: 143 MG/DL (ref 65–100)
GLUCOSE BLD STRIP.AUTO-MCNC: 144 MG/DL (ref 65–100)
GLUCOSE BLD STRIP.AUTO-MCNC: 153 MG/DL (ref 65–100)
GLUCOSE BLD STRIP.AUTO-MCNC: 161 MG/DL (ref 65–100)
GLUCOSE SERPL-MCNC: 123 MG/DL (ref 65–100)
GRAM STN SPEC: NORMAL
GRAM STN SPEC: NORMAL
HCT VFR BLD AUTO: 27.4 % (ref 36.6–50.3)
HGB BLD-MCNC: 8.8 G/DL (ref 12.1–17)
IMM GRANULOCYTES # BLD AUTO: 0.1 K/UL (ref 0–0.04)
IMM GRANULOCYTES NFR BLD AUTO: 1 % (ref 0–0.5)
LYMPHOCYTES # BLD: 1.4 K/UL (ref 0.8–3.5)
LYMPHOCYTES NFR BLD: 11 % (ref 12–49)
MCH RBC QN AUTO: 27.7 PG (ref 26–34)
MCHC RBC AUTO-ENTMCNC: 32.1 G/DL (ref 30–36.5)
MCV RBC AUTO: 86.2 FL (ref 80–99)
MONOCYTES # BLD: 1.1 K/UL (ref 0–1)
MONOCYTES NFR BLD: 8 % (ref 5–13)
NEUTS SEG # BLD: 9.9 K/UL (ref 1.8–8)
NEUTS SEG NFR BLD: 79 % (ref 32–75)
NRBC # BLD: 0 K/UL (ref 0–0.01)
NRBC BLD-RTO: 0 PER 100 WBC
PLATELET # BLD AUTO: 352 K/UL (ref 150–400)
PMV BLD AUTO: 10.6 FL (ref 8.9–12.9)
POTASSIUM SERPL-SCNC: 3.3 MMOL/L (ref 3.5–5.1)
RBC # BLD AUTO: 3.18 M/UL (ref 4.1–5.7)
SERVICE CMNT-IMP: ABNORMAL
SERVICE CMNT-IMP: NORMAL
SODIUM SERPL-SCNC: 134 MMOL/L (ref 136–145)
WBC # BLD AUTO: 12.6 K/UL (ref 4.1–11.1)

## 2020-11-07 PROCEDURE — 74011250637 HC RX REV CODE- 250/637: Performed by: INTERNAL MEDICINE

## 2020-11-07 PROCEDURE — 65660000000 HC RM CCU STEPDOWN

## 2020-11-07 PROCEDURE — 73100 X-RAY EXAM OF WRIST: CPT

## 2020-11-07 PROCEDURE — 74011636637 HC RX REV CODE- 636/637: Performed by: INTERNAL MEDICINE

## 2020-11-07 PROCEDURE — 74011250636 HC RX REV CODE- 250/636: Performed by: INTERNAL MEDICINE

## 2020-11-07 PROCEDURE — 74011250636 HC RX REV CODE- 250/636: Performed by: HOSPITALIST

## 2020-11-07 PROCEDURE — 80048 BASIC METABOLIC PNL TOTAL CA: CPT

## 2020-11-07 PROCEDURE — 36415 COLL VENOUS BLD VENIPUNCTURE: CPT

## 2020-11-07 PROCEDURE — 74011250637 HC RX REV CODE- 250/637: Performed by: HOSPITALIST

## 2020-11-07 PROCEDURE — 85025 COMPLETE CBC W/AUTO DIFF WBC: CPT

## 2020-11-07 PROCEDURE — 74011000258 HC RX REV CODE- 258: Performed by: INTERNAL MEDICINE

## 2020-11-07 PROCEDURE — 74011250636 HC RX REV CODE- 250/636: Performed by: NURSE PRACTITIONER

## 2020-11-07 PROCEDURE — 82962 GLUCOSE BLOOD TEST: CPT

## 2020-11-07 RX ORDER — LANOLIN ALCOHOL/MO/W.PET/CERES
400 CREAM (GRAM) TOPICAL 2 TIMES DAILY
Status: COMPLETED | OUTPATIENT
Start: 2020-11-07 | End: 2020-11-09

## 2020-11-07 RX ORDER — POTASSIUM CHLORIDE 750 MG/1
40 TABLET, FILM COATED, EXTENDED RELEASE ORAL
Status: COMPLETED | OUTPATIENT
Start: 2020-11-07 | End: 2020-11-07

## 2020-11-07 RX ORDER — MELOXICAM 7.5 MG/1
7.5 TABLET ORAL DAILY
Status: DISCONTINUED | OUTPATIENT
Start: 2020-11-07 | End: 2020-11-07

## 2020-11-07 RX ORDER — PREDNISONE 20 MG/1
20 TABLET ORAL 2 TIMES DAILY
Status: DISCONTINUED | OUTPATIENT
Start: 2020-11-07 | End: 2020-11-08

## 2020-11-07 RX ORDER — IBUPROFEN 400 MG/1
400 TABLET ORAL 3 TIMES DAILY
Status: DISCONTINUED | OUTPATIENT
Start: 2020-11-07 | End: 2020-11-09

## 2020-11-07 RX ORDER — IBUPROFEN 400 MG/1
800 TABLET ORAL 3 TIMES DAILY
Status: DISCONTINUED | OUTPATIENT
Start: 2020-11-07 | End: 2020-11-07

## 2020-11-07 RX ORDER — FAMOTIDINE 20 MG/1
20 TABLET, FILM COATED ORAL DAILY
Status: DISCONTINUED | OUTPATIENT
Start: 2020-11-07 | End: 2020-11-10 | Stop reason: HOSPADM

## 2020-11-07 RX ADMIN — Medication 10 ML: at 21:23

## 2020-11-07 RX ADMIN — Medication 10 ML: at 05:49

## 2020-11-07 RX ADMIN — ACETAMINOPHEN 650 MG: 325 TABLET ORAL at 13:49

## 2020-11-07 RX ADMIN — AMLODIPINE BESYLATE 5 MG: 5 TABLET ORAL at 10:15

## 2020-11-07 RX ADMIN — Medication 10 ML: at 16:48

## 2020-11-07 RX ADMIN — IBUPROFEN 400 MG: 400 TABLET, FILM COATED ORAL at 16:47

## 2020-11-07 RX ADMIN — LOSARTAN POTASSIUM 100 MG: 50 TABLET, FILM COATED ORAL at 10:14

## 2020-11-07 RX ADMIN — HEPARIN SODIUM 5000 UNITS: 5000 INJECTION INTRAVENOUS; SUBCUTANEOUS at 13:10

## 2020-11-07 RX ADMIN — PIPERACILLIN AND TAZOBACTAM 3.38 G: 3; .375 INJECTION, POWDER, LYOPHILIZED, FOR SOLUTION INTRAVENOUS at 13:10

## 2020-11-07 RX ADMIN — IBUPROFEN 400 MG: 400 TABLET, FILM COATED ORAL at 21:05

## 2020-11-07 RX ADMIN — HEPARIN SODIUM 5000 UNITS: 5000 INJECTION INTRAVENOUS; SUBCUTANEOUS at 21:05

## 2020-11-07 RX ADMIN — Medication 400 MG: at 16:47

## 2020-11-07 RX ADMIN — LEVOFLOXACIN 500 MG: 5 INJECTION, SOLUTION INTRAVENOUS at 18:55

## 2020-11-07 RX ADMIN — PIPERACILLIN AND TAZOBACTAM 3.38 G: 3; .375 INJECTION, POWDER, LYOPHILIZED, FOR SOLUTION INTRAVENOUS at 05:45

## 2020-11-07 RX ADMIN — POTASSIUM CHLORIDE 40 MEQ: 750 TABLET, FILM COATED, EXTENDED RELEASE ORAL at 16:46

## 2020-11-07 RX ADMIN — INSULIN LISPRO 2 UNITS: 100 INJECTION, SOLUTION INTRAVENOUS; SUBCUTANEOUS at 13:10

## 2020-11-07 RX ADMIN — FAMOTIDINE 20 MG: 20 TABLET ORAL at 16:48

## 2020-11-07 RX ADMIN — INSULIN LISPRO 2 UNITS: 100 INJECTION, SOLUTION INTRAVENOUS; SUBCUTANEOUS at 16:45

## 2020-11-07 RX ADMIN — ROSUVASTATIN CALCIUM 10 MG: 10 TABLET, FILM COATED ORAL at 21:06

## 2020-11-07 RX ADMIN — SODIUM CHLORIDE, SODIUM LACTATE, POTASSIUM CHLORIDE, AND CALCIUM CHLORIDE 50 ML/HR: 600; 310; 30; 20 INJECTION, SOLUTION INTRAVENOUS at 10:17

## 2020-11-07 RX ADMIN — PREDNISONE 20 MG: 20 TABLET ORAL at 16:47

## 2020-11-07 RX ADMIN — HEPARIN SODIUM 5000 UNITS: 5000 INJECTION INTRAVENOUS; SUBCUTANEOUS at 05:45

## 2020-11-07 NOTE — PROGRESS NOTES
Problem: Falls - Risk of  Goal: *Absence of Falls  Description: Document Clalie Joyce Fall Risk and appropriate interventions in the flowsheet. Outcome: Progressing Towards Goal  Note: Fall Risk Interventions:  Mobility Interventions: Assess mobility with egress test, Bed/chair exit alarm, Communicate number of staff needed for ambulation/transfer, Patient to call before getting OOB, PT Consult for assist device competence, Strengthening exercises (ROM-active/passive)    Mentation Interventions: Adequate sleep, hydration, pain control, Bed/chair exit alarm, Door open when patient unattended, Increase mobility, More frequent rounding, Reorient patient, Room close to nurse's station    Medication Interventions: Assess postural VS orthostatic hypotension, Bed/chair exit alarm, Evaluate medications/consider consulting pharmacy, Patient to call before getting OOB    Elimination Interventions: Bed/chair exit alarm, Call light in reach, Stay With Me (per policy)              Problem: Pressure Injury - Risk of  Goal: *Prevention of pressure injury  Description: Document Sulaiman Scale and appropriate interventions in the flowsheet.   Outcome: Progressing Towards Goal  Note: Pressure Injury Interventions:  Sensory Interventions: Assess changes in LOC, Float heels, Keep linens dry and wrinkle-free, Minimize linen layers    Moisture Interventions: Apply protective barrier, creams and emollients, Absorbent underpads, Check for incontinence Q2 hours and as needed, Internal/External urinary devices    Activity Interventions: Assess need for specialty bed, PT/OT evaluation    Mobility Interventions: Assess need for specialty bed, Float heels, HOB 30 degrees or less    Nutrition Interventions: Document food/fluid/supplement intake, Discuss nutritional consult with provider, Offer support with meals,snacks and hydration    Friction and Shear Interventions: Apply protective barrier, creams and emollients, Lift sheet                Problem: General Medical Care Plan  Goal: *Vital signs within specified parameters  Outcome: Progressing Towards Goal  Goal: *Labs within defined limits  Outcome: Progressing Towards Goal  Goal: *Absence of infection signs and symptoms  Outcome: Not Progressing Towards Goal  Goal: *Optimal pain control at patient's stated goal  Outcome: Not Progressing Towards Goal  Goal: *Skin integrity maintained  Outcome: Progressing Towards Goal  Goal: *Fluid volume balance  Outcome: Progressing Towards Goal  Goal: *Optimize nutritional status  Outcome: Not Progressing Towards Goal  Goal: *Anxiety reduced or absent  Outcome: Progressing Towards Goal  Goal: *Progressive mobility and function (eg: ADL's)  Outcome: Not Progressing Towards Goal

## 2020-11-07 NOTE — PROGRESS NOTES
Bedside and Verbal shift change report given to Odalis Sam RN (oncoming nurse) by Deya Rocha RN (offgoing nurse). Report included the following information SBAR, Kardex, Intake/Output, MAR, Recent Results, Med Rec Status, Cardiac Rhythm NSR and Alarm Parameters .

## 2020-11-07 NOTE — PROGRESS NOTES
Problem: Risk for Spread of Infection  Goal: Prevent transmission of infectious organism to others  Description: Prevent the transmission of infectious organisms to other patients, staff members, and visitors. Outcome: Progressing Towards Goal     Problem: Patient Education:  Go to Education Activity  Goal: Patient/Family Education  Outcome: Progressing Towards Goal     Problem: Patient Education: Go to Patient Education Activity  Goal: Patient/Family Education  Outcome: Progressing Towards Goal     Problem: Pressure Injury - Risk of  Goal: *Prevention of pressure injury  Description: Document Sulaiman Scale and appropriate interventions in the flowsheet. Outcome: Progressing Towards Goal  Note: Pressure Injury Interventions:  Sensory Interventions: Assess changes in LOC    Moisture Interventions: Absorbent underpads, Apply protective barrier, creams and emollients, Check for incontinence Q2 hours and as needed, Limit adult briefs    Activity Interventions: Assess need for specialty bed    Mobility Interventions: HOB 30 degrees or less, Pressure redistribution bed/mattress (bed type), PT/OT evaluation    Nutrition Interventions: Document food/fluid/supplement intake    Friction and Shear Interventions: Minimize layers                Problem:  Body Temperature -  Risk of, Imbalanced  Goal: *Absence of heat stress or hyperthermia signs and symptoms  Outcome: Progressing Towards Goal  Goal: *Absence of cold stress or hypothermia signs and symptoms  Outcome: Progressing Towards Goal     Problem: Patient Education: Go to Patient Education Activity  Goal: Patient/Family Education  Outcome: Progressing Towards Goal     Problem: Delirium Treatment  Goal: *Level of consciousness restored to baseline  Outcome: Progressing Towards Goal  Goal: *Level of environmental perceptions restored to baseline  Outcome: Progressing Towards Goal  Goal: *Sensory perception restored to baseline  Outcome: Progressing Towards Goal  Goal: *Emotional stability restored to baseline  Outcome: Progressing Towards Goal  Goal: *Functional assessment restored to baseline  Outcome: Progressing Towards Goal  Goal: *Absence of falls  Outcome: Progressing Towards Goal  Goal: *Will remain free of delirium, CAM Score negative  Outcome: Progressing Towards Goal  Goal: *Cognitive status will be restored to baseline  Outcome: Progressing Towards Goal  Goal: Interventions  Outcome: Progressing Towards Goal

## 2020-11-07 NOTE — PROGRESS NOTES
ID Progress Note  2020    Subjective:     Having fevers. He continues to have bilateral ankle pain. He now has proximal forearm pain and left wrist pain. No dyspnea, abdominal pain, headache or sore throat. ROS: No anaphylaxis, seizures, syncope, hematemesis, hematochezia     Objective:     Vitals:   Visit Vitals  BP (!) 123/56 (BP 1 Location: Right arm, BP Patient Position: At rest)   Pulse 91   Temp 99 °F (37.2 °C)   Resp 22   Ht 6' 2\" (1.88 m)   Wt 122.8 kg (270 lb 12.8 oz)   SpO2 95%   BMI 34.77 kg/m²        Tmax:  Temp (24hrs), Av.9 °F (37.7 °C), Min:98.3 °F (36.8 °C), Max:101.6 °F (38.7 °C)      Exam:    Not in distress  Pink conjunctivae, anicteric sclerae  No cervical lymphdenopathy   Lung clear, no rales, wheezes or rhonchi   Heart: s1, s2, RRR, no murmurs rubs or clicks  Abdomen: soft nontender, no guarding or rebound  Left proximal/ forearm elbow area is swollen. Left wrist is swollen. There was an IV line near the left wrist yesterday. Speech fluent     Labs:   Lab Results   Component Value Date/Time    WBC 13.4 (H) 2020 03:25 AM    HGB 8.5 (L) 2020 03:25 AM    HCT 26.0 (L) 2020 03:25 AM    PLATELET 723  03:25 AM    MCV 86.4 2020 03:25 AM     Lab Results   Component Value Date/Time    Sodium 134 (L) 2020 03:25 AM    Potassium 3.4 (L) 2020 03:25 AM    Chloride 99 2020 03:25 AM    CO2 27 2020 03:25 AM    Anion gap 8 2020 03:25 AM    Glucose 146 (H) 2020 03:25 AM    BUN 12 2020 03:25 AM    Creatinine 0.87 2020 03:25 AM    BUN/Creatinine ratio 14 2020 03:25 AM    GFR est AA >60 2020 03:25 AM    GFR est non-AA >60 2020 03:25 AM    Calcium 9.2 2020 03:25 AM    Bilirubin, total 0.7 10/31/2020 03:16 AM    Alk.  phosphatase 55 10/31/2020 03:16 AM    Protein, total 6.6 10/31/2020 03:16 AM    Albumin 3.0 (L) 10/31/2020 03:16 AM    Globulin 3.6 10/31/2020 03:16 AM    A-G Ratio 0.8 (L) 10/31/2020 03:16 AM    ALT (SGPT) 19 10/31/2020 03:16 AM       Rheumatoid factor negative   Omayra neg   Mycoplasma IgG very high, IgM negative, but respiratory pcr for mycoplasma negative. Assessment:     #1 fever     #2 ankle tenderness     #3 groundglass opacities on CT - covid neg x 2, respiratory viral panel neg      #4 diabetes     #5 hypertension     #6 dyslipidemia    #7 superficial thrombus distal cephalic vein    Recommendations: The patient continues to have fever. His main symptom is tenderness and warmth on bilateral ankles. Arthrocentesis was done but was not able to obtain any fluid. Ortho has recommended starting steroids. His left wrist is also now swollen. There was an IV present there yesterday. He has at least 3 joints involved now. His mycoplasma IgG titer is very high although his IgM and mycoplasma PCR from his nasopharynx are negative. Mycoplasma is known to cause extrapulmonary manifestations. Rarely it can cause arthritis. He also has a superficial distal cephalic vein thrombosis. The IV line that was in the left forearm area has been removed. This by itself can cause fever. Continue abx for now.     Soy Bran MD

## 2020-11-07 NOTE — PROGRESS NOTES
ID Progress Note  2020    Subjective:     Having fevers. He continues to have bilateral ankle pain. He now has proximal forearm pain and left wrist pain. Objective:     Vitals:   Visit Vitals  /76 (BP 1 Location: Right arm, BP Patient Position: At rest;Supine)   Pulse 88   Temp (!) 101.9 °F (38.8 °C)   Resp 24   Ht 6' 2\" (1.88 m)   Wt 123.7 kg (272 lb 11.3 oz)   SpO2 94%   BMI 35.01 kg/m²        Tmax:  Temp (24hrs), Av.6 °F (37.6 °C), Min:98.4 °F (36.9 °C), Max:101.9 °F (38.8 °C)      Exam:    Not in distress  Heart: s1, s2, RRR, no murmurs rubs or clicks  Abdomen: soft nontender, no guarding or rebound  Left proximal/ forearm elbow area is swollen. Left wrist is swollen. Speech fluent     Labs:   Lab Results   Component Value Date/Time    WBC 12.6 (H) 2020 03:26 AM    HGB 8.8 (L) 2020 03:26 AM    HCT 27.4 (L) 2020 03:26 AM    PLATELET 266  03:26 AM    MCV 86.2 2020 03:26 AM     Lab Results   Component Value Date/Time    Sodium 134 (L) 2020 03:26 AM    Potassium 3.3 (L) 2020 03:26 AM    Chloride 97 2020 03:26 AM    CO2 26 2020 03:26 AM    Anion gap 11 2020 03:26 AM    Glucose 123 (H) 2020 03:26 AM    BUN 10 2020 03:26 AM    Creatinine 0.90 2020 03:26 AM    BUN/Creatinine ratio 11 (L) 2020 03:26 AM    GFR est AA >60 2020 03:26 AM    GFR est non-AA >60 2020 03:26 AM    Calcium 9.0 2020 03:26 AM    Bilirubin, total 0.7 10/31/2020 03:16 AM    Alk. phosphatase 55 10/31/2020 03:16 AM    Protein, total 6.6 10/31/2020 03:16 AM    Albumin 3.0 (L) 10/31/2020 03:16 AM    Globulin 3.6 10/31/2020 03:16 AM    A-G Ratio 0.8 (L) 10/31/2020 03:16 AM    ALT (SGPT) 19 10/31/2020 03:16 AM       Rheumatoid factor negative   Omayra neg   Mycoplasma IgG very high, IgM negative, but respiratory pcr for mycoplasma negative.      Assessment:     #1 fever     #2 ankle tenderness     #3 groundglass opacities on CT - covid neg x 2, respiratory viral panel neg      #4 diabetes     #5 hypertension     #6 dyslipidemia    #7 superficial thrombus distal cephalic vein    Recommendations: The patient continues to have fever. His main symptom is tenderness and warmth on bilateral ankles. Arthrocentesis was done but was not able to obtain any fluid. Steroids started by primary team.     His left wrist is also now swollen. There was an IV present there yesterday. He has at least 3 joints involved now. His mycoplasma IgG titer is very high although his IgM and mycoplasma PCR from his nasopharynx are negative. Mycoplasma is known to cause extrapulmonary manifestations. Rarely it can cause arthritis. He also has a superficial distal cephalic vein thrombosis. The IV line that was in the left forearm area has been removed. This by itself can cause fever. Continue abx for now. Let's see how the steroids do.      Felix Sanchez MD

## 2020-11-07 NOTE — DISCHARGE SUMMARY
Discharge Summary       PATIENT ID: Estuardo Vora  MRN: [id number]   YOB: 1950    DATE OF ADMISSION: 10/30/2020  5:02 PM    DATE OF DISCHARGE: 11/06/20  PRIMARY CARE PROVIDER: Sandra Odonnell MD     ATTENDING PHYSICIAN: Dr. Leeanna Connor   DISCHARGING PROVIDER: Dai Coffey NP    To contact this individual call [phone] and ask the  to page. If unavailable ask to be transferred the Adult Hospitalist Department. CONSULTATIONS: IP CONSULT TO INFECTIOUS DISEASES  IP CONSULT TO ORTHOPEDIC SURGERY    PROCEDURES/SURGERIES: * No surgery found *    [id number] COURSE:     Acute delirium  Acute respiratory failure with hypoxia  DMII with hyperglycemia  Sepsis  Hypertension  Dyslipidemia    From H&P 10/31/2020:  \"This is a 19-year-old man with a past medical history significant for type 2 diabetes, hypertension, dyslipidemia, was in his usual state of health until the day of his presentation at the emergency room when the patient developed a change in mental status. The patient reported to his dentist's office and underwent a number of tooth extractions. The procedure finishing at about 10:00 a.m. The patient developed a change in mental status after the procedure. He was monitored at the dentist's office without any improvement in his mental status. The patient was at baseline before the procedure according to report. The patient was also found to be hypoxic with oxygen saturation of 88%. The oxygen saturation improved with supplemental oxygen. Because of the persistent change in mental status, the patient was sent to the emergency room at the [facility] in [place]. The patient has no history of congestive heart failure or respiratory disease. There was no sick contact or contact with any person with COVID-19 virus infection.   When the patient arrived at the emergency room, the patient was found to have a fever of 101.9, significant leukocytosis, and elevated lactic acid level. Lumbar puncture was performed. The patient received antibiotics and was referred to the hospitalist service for evaluation for admission. CT scan of the head done on arrival at the emergency room did not show any acute intracranial abnormalities. No record of prior admission to this hospital.\"    Ortho Note 11/5/20:  \"SUBJECTIVE:  Shamika Fraga c/o mod/severe bilat ankle throbbing pain and now notes pain bilat great toe as well. Pain started about 3 days ago, after he was admitted. Reports similar pain about 4 episodes per year that he assumes is gout and self treats with home remedies such as cherries. Took colchicine once in past without relief. Family hoping to get patient to [facility] soon. Ankle Xrays no fracture, + DJD. ASSESSMENT:  Given now bilat great toe MTP pain as well as bilat ankle, strongly suspect acute gouty arthropathy or other inflammatory process. PLAN:  Consider conservative treatment of gout or inflammatory process. Steroids if not contra-indicated. No plans for further intervention from Ortho perspective. DVT proph per primary team.  PT/OT WBAT BLE with device if OK with medicine. \"     PT Note 11/5/20:  \"ASSESSMENT  Patient continues with skilled PT services and is not progressing towards goals. Pt received supine in bed and agreeable to therapy with encouragement. Pt tolerated session fairly. Pt demonstrated delayed processing, poor motor planning and initiation of movements, decreased strength and functional mobility, impaired seated balance and tolerance to activity, LE pain and hypersensitivity to touch, and lack of motivation. Pt required max A x 2 supine to sit EOB. Pt deferred recommendations made by therapists to for ease of mobility and preferred to perform actions his own way. Pt with poor sitting balance requiring constant assist at posterior trunk.  Pt was able to initiate anterior trunk leaning with max verbal cues and physical assist. Pt returned to supine position with total assistance x 3 person. Pt is functioning well below baseline mobility status and at this time is most appropriate for SNF placement. .   Current Level of Function Impacting Discharge (mobility/balance): max-total A x 2-3 person for supine<>sit, poor sitting balance  Other factors to consider for discharge: previously mod I  PLAN :  Patient continues to benefit from skilled intervention to address the above impairments. Continue treatment per established plan of care. to address goals. Recommendation for discharge: (in order for the patient to meet his/her long term goals)  Therapy up to 5 days/week in SNF setting  This discharge recommendation:  Has been made in collaboration with the attending provider and/or case management  IF patient discharges home will need the following DME: total care\"    OT Note 11/5/20:  \"ASSESSMENT  Patient continues with skilled OT services and is progressing towards goals. Patient continues to be limited by significant and fluctuating pain, hypersensitivity to touch (LE>UE), confusion, impaired sitting balance (strong posterior lean) and cognition, poor activity tolerance/endurance, and global debility noted during EOB targeting activity and dependent toileting. Of note, patient requiring max physical and cognitive assist of 2 with significantly increased time for minimal initiation of movement at this time (largely d/t pain). Patient not appropriate for IPR at this time; therefore, recommending SNF rehab to address safety during functional mobility and ADLs. Current Level of Function Impacting Discharge (ADLs): max to total A x2 all ADLs and mobility   Other factors to consider for discharge: lives in [facility], significant pain, unclear diagnosis, not safe to d/c home, cognition    PLAN :  Patient continues to benefit from skilled intervention to address the above impairments.   Continue treatment per established plan of care. to address goals. Recommend with staff: encourage rolling for bedpan and assist with ADLs as able, bed in chair position 3x/day for all meals, yoshi to chair with 2-3 assist. Thank you. Recommend next OT session: EOB ADL, trial standing   Recommendation for discharge: (in order for the patient to meet his/her long term goals)  Therapy up to 5 days/week in SNF setting  This discharge recommendation:  Has not yet been discussed the attending provider and/or case management  IF patient discharges home will need the following DME: TBD pending progression in acute therapies\"    Infectious Disease Note 11/6/20:  \"Assessment:  #1 fever  #2 ankle tenderness  #3 groundglass opacities on CT - covid neg x 2, respiratory viral panel neg   #4 diabetes  #5 hypertension  #6 dyslipidemia  #7 superficial thrombus distal cephalic vein  Recommendations: The patient continues to have fever.  His main symptom is tenderness and warmth on bilateral ankles.    Arthrocentesis was done but was not able to obtain any fluid. Ortho has recommended starting steroids. His left wrist is also now swollen. There was an IV present there yesterday. He has at least 3 joints involved now. His mycoplasma IgG titer is very high although his IgM and mycoplasma PCR from his nasopharynx are negative. Mycoplasma is known to cause extrapulmonary manifestations. Rarely it can cause arthritis. He also has a superficial distal cephalic vein thrombosis. The IV line that was in the left forearm area has been removed. This by itself can cause fever. Continue abx for now. Soy Bran MD\"    Nutrition Note 11/6/20:  \"RECOMMENDATIONS:  Recommend continue Dental Soft diet  Added Glucerna BID  SUBJECTIVE/OBJECTIVE:  Information obtained from:   Chart/staff      Admitted with AMS, hypoxia. PMHx: DM, HTN, DLD. Developed AMS post tooth extractions. Noted pt trying to transfer NC to hospital closer to home. No weight hx.  Pt PO has been poor since admission. Added Glucerna BID for hx of DM and need for increased calories since food intake is low. Will do full assessment if pt does not transfer over the weekend. Diet:  Dental Soft  Supplements: Consult- added Glucerna BID  Intake: Poor. \"    10/30/2020: LP done. CSF fluid without WBCs, gram stain negative. CBC w/ WBCs 72643, started on Vanc/Zosyn; COVID negative  10/31/2020: UA negative; urine culture negative; TSH 0.51  11/02/2020: BC drawn; NGTD  11/03/2020: ID consult; vanc stopped and levaquin started; Respiratory PCR negative, COVID negative again, S. Pneumo AG UR/CSF still pending; ABG w/ PO2 57, normal CO2-pt started on CPAP qhs with improvement; suspect underlying SHAKIR  11/04/2020: Arthrocentesis of left ankle DJD/effusion. No fluid removed. Serum RA factor negative, Serum uric acid 3.9, Urine legionella AG negative, WILLIAM direct neg, Cyclic citrul peptide Ab, IGG negative, serum mycoplasma Ab IGG elevated 1,274/IGM negative, serum legionella Ab IGG/IGM still pending  11/05/2020: PREM doppler neg for DVT but shows superficial distal cephalic vein; PIV removed; BLE dopplers negative for DVT    11/06/2020: According to the patient's son, the patient did have all of his top teeth extracted and the dental implant hardware was installed. This is the point the patient became hypoxic and w/ AMS and was transferred to the hospital. His WBCs have fluctuated grossly starting 10/30/20 at 19.5->20.7->13.4->12.3->13.0->15.8->15.6->13.4. The patient is alert and oriented to self, place and situation but not to time. Despite continued abx he continues to spike fevers of greater than 102. He reports pain to bilateral ankles, making mobility difficult as well as generalized weakness. He is ambulatory at baseline. Pt is being transferred to [facility] [place] in [facility] per family request. Accepting physician is Kayden Enciso.  At this time the patient denies HA, dizziness, visual disturbance, cough, CP, SOB, abd pain, n/v/d or dysuria. Discussed with Dr. Ignacio Pérez. Contact Info: The patient's son, Kelley Dailey is POC and his mobile number is [phone]/[place] [phone]. The patient's spouse is Wade Dee and her Mobile number is [phone]. [facility]: [phone]    Dr. Matthew Hillman, Infectious Disease: [phone]    DISCHARGE DIAGNOSES / PLAN:      Acute delirium.  metabolic encephalopathy- Resolved   opiates related ? From dental procedure drug screen positive   - CT head negative   -Patient may have obstructive sleep apnea ABG showed some retention of CO2 placed on CPAP now more clear     Acute respiratory failure with hypoxia-Resolved   - Continue CPAP qhs; suspect underlying SHAKIR  - CTA chest w/ b/l lower lob ground glass opacities - neg covid x2  - unable to obtain sputum culture; consider bronch    Bilateral ankle edema w/ joint effusions: Unchanged  -neg RA factor, neg uric acid, neg WILLIAM  -Ortho attempted to aspirate ankle joint fluid 11/4 unsuccessfully     Type 2 diabetes with hyperglycemia: Stable  - A1c 7.5  -Cont AC/HS accuchecks w/ SSI  -trend BS     Sepsis: Intermittent fever spikes  Unclear etiology may be mandibular abscess seen in CT maxillofacial vs b/l ankle effusions vs PNA  -The diagnosis of sepsis is supported by elevated lactic acid level, fever, and significant leukocytosis.    - Patient on levaquin and Zosyn.  LP shows normal CSF, BC NGTD, urine culture neg  -Unable to obtain sputum sample - may need bronch to obtain  -Ortho attempted to aspirate ankle joint fluid 11/4 unsuccessfully  - COVID test neg x2  -Appreciate ID input      Hypertension: Stable  -cont amlodipine, cozaar; monitor BP     Dyslipidemia: Stable  -cont crestor.     Hypokalemia  -replete as needed    Anemia: Stable  -unclear etiology  -stable Hgb 8-9, monitor HH        ADDITIONAL CARE RECOMMENDATIONS:   It is imperative to follow up as instructed for further assessment and management of your acute and chronic health conditions. Please also read the attached educational handouts as they contain important information and instructions about your care. Imaging Results:     CT head WO contrast 10/30/20:  FINDINGS:  Generalized prominence of cerebral sulci and ventricles is moderately  significantly increased, greater than expected for stated age. . Periventricular  white matter low-density is mild and diffuse, but there is parenchymal volume  loss suggested over the right parietal convexity, and focal scattered areas of  possible prior cortical ischemia. . There is no intracranial hemorrhage,  extra-axial collection, or mass effect. The basilar cisterns are open. No CT  evidence of acute infarct. The bone windows demonstrate no abnormalities. Mucoperiosteal thickening is  significant in the left sphenoid sinus but there are no air-fluid levels. .  IMPRESSION:   1. No acute intracranial abnormality. 2. Microvascular ischemic, possible old ischemic and age-related change greater  than expected for stated age. 3. Mild sinusitis. CXR 10/30/20:  FINDINGS: A single frontal view of the chest at 1759 hours shows perihilar  prominence of lung markings with a poor inspiratory effort and diffuse  interstitial prominence. . No focal infiltrate or effusion is confirmed, although  the left lung base is not included entirely. The heart, mediastinum and  pulmonary vasculature are stable . The bony thorax is unremarkable for age. Jong Jiang IMPRESSION:  Mild central vascular fullness and interstitial prominence. Poor inspiratory  effort. No focal infiltrate. .  .    CT abd/pelv w/ contrast 11/1/20:  FINDINGS:   There are colonic diverticula and gallstones. There is no apparent inflammation. There is no ascites, pneumoperitoneum or significant adenopathy. Liver shows no  significant finding. Bile ducts are not enlarged. Pancreas shows no mass or  inflammation.  Spleen is unremarkable. Adrenal glands are normal in size. Kidneys  show no mass or hydronephrosis. Aorta is without aneurysm. The appendix is normal. Bowels are not dilated. The bladder is unremarkable. The  distal ureters are not dilated. There is no apparent pelvic mass. IMPRESSION: No Acute Disease    CTA Chest 11/1/20:  FINDINGS: There is no pulmonary embolism. There is no acute cor pulmonale. There  is no aortic dissection or aneurysm. Lungs show patchy groundglass disease primarily in the lower lobes, with some  involvement of the right upper and middle lobes, nonspecific but with atypical  pneumonia possible. There is no pneumothorax. There is no pleural effusion or significant  pericardial fluid. There is no significant adenopathy. Visualized thyroid and lower neck soft tissues are unremarkable for age. IMPRESSION:   1. No pulmonary emboli. 2. Groundglass airspace disease, nonspecific. CT Maxillofacial WO contrast 11/1/20:  FINDINGS:  Bones: There is no fracture. Lucency surrounds the right mandibular dental roots  possibly representing small periapical abscess formation. There is no overt  mandibular osteomyelitis. There is some soft tissue swelling around the mandible  bilaterally without apparent abscess. Paranasal sinuses: Clear. Orbits: The globes, optic nerves, and extraocular muscles are within normal  limits. .  IMPRESSION:   No fracture. Possible small right mandibular periapical dental abscesses. LT Ankle AP/Lat 11/4/20:  FINDINGS: Two views of the left ankle demonstrate soft tissue swelling. Chronic  bony changes distal to the medial malleolus. Ankle mortise is otherwise  maintained. The lateral is rotated. There are degenerative changes of the  midfoot. An acute fracture is not identified. . There is a joint effusion. IMPRESSION:   1. Soft tissue swelling with ankle effusion. Degenerative changes of the midfoot  and chronic loose bodies distal to the medial malleolus.     RT Ankle AP/Lat 11/4/20:  FINDINGS: Two views of the right ankle demonstrate no fracture or disruption of  the ankle mortise. There is no acute osseous abnormality is demonstrated,  however there are findings of osteoarthrosis at the tibiotalar joint and some of  the tarsal joints. There is a suggested joint effusion. Also noted are small  spurs at the insertion of Achilles tendon and plantar fascia on the calcaneus. No destruction, acute erosion or other acute osseous abnormality demonstrated. IMPRESSION:  1. Possible tibiotalar joint effusion. 2. Mild diffuse swelling of the right foot/ankle. 3. Mild chronic degenerative change. 4. No acute osseous abnormality demonstrated    Venous Duplex RUE 11/5/20:  Interpretation Summary   · Evidence of superficial thrombus of the left distal cephalic arm vein. · No evidence of left upper extremity deep vein thrombosis in the visualized vein segments. Venous Duplex BLE 11/5/20:  Interpretation Summary   · No evidence of lower extremity vein thrombosis in the visualized vein segments bilaterally. · Incidental finding of prominent groin lymph nodes bilaterally. Recent Results (from the past 24 hour(s))   GLUCOSE, POC    Collection Time: 11/05/20  9:43 PM   Result Value Ref Range    Glucose (POC) 155 (H) 65 - 100 mg/dL    Performed by Julia Collins    CBC WITH AUTOMATED DIFF    Collection Time: 11/06/20  3:25 AM   Result Value Ref Range    WBC 13.4 (H) 4.1 - 11.1 K/uL    RBC 3.01 (L) 4.10 - 5.70 M/uL    HGB 8.5 (L) 12.1 - 17.0 g/dL    HCT 26.0 (L) 36.6 - 50.3 %    MCV 86.4 80.0 - 99.0 FL    MCH 28.2 26.0 - 34.0 PG    MCHC 32.7 30.0 - 36.5 g/dL    RDW 13.2 11.5 - 14.5 %    PLATELET 977 973 - 909 K/uL    MPV 10.2 8.9 - 12.9 FL    NRBC 0.0 0  WBC    ABSOLUTE NRBC 0.00 0.00 - 0.01 K/uL    NEUTROPHILS 79 (H) 32 - 75 %    LYMPHOCYTES 11 (L) 12 - 49 %    MONOCYTES 9 5 - 13 %    EOSINOPHILS 0 0 - 7 %    BASOPHILS 0 0 - 1 %    IMMATURE GRANULOCYTES 1 (H) 0.0 - 0.5 %    ABS. NEUTROPHILS 10.5 (H) 1.8 - 8.0 K/UL    ABS. LYMPHOCYTES 1.5 0.8 - 3.5 K/UL    ABS. MONOCYTES 1.3 (H) 0.0 - 1.0 K/UL    ABS. EOSINOPHILS 0.0 0.0 - 0.4 K/UL    ABS. BASOPHILS 0.1 0.0 - 0.1 K/UL    ABS. IMM. GRANS. 0.1 (H) 0.00 - 0.04 K/UL    DF AUTOMATED     METABOLIC PANEL, BASIC    Collection Time: 11/06/20  3:25 AM   Result Value Ref Range    Sodium 134 (L) 136 - 145 mmol/L    Potassium 3.4 (L) 3.5 - 5.1 mmol/L    Chloride 99 97 - 108 mmol/L    CO2 27 21 - 32 mmol/L    Anion gap 8 5 - 15 mmol/L    Glucose 146 (H) 65 - 100 mg/dL    BUN 12 6 - 20 MG/DL    Creatinine 0.87 0.70 - 1.30 MG/DL    BUN/Creatinine ratio 14 12 - 20      GFR est AA >60 >60 ml/min/1.73m2    GFR est non-AA >60 >60 ml/min/1.73m2    Calcium 9.2 8.5 - 10.1 MG/DL   GLUCOSE, POC    Collection Time: 11/06/20  6:49 AM   Result Value Ref Range    Glucose (POC) 152 (H) 65 - 100 mg/dL    Performed by Apryl Carrera, POC    Collection Time: 11/06/20 11:50 AM   Result Value Ref Range    Glucose (POC) 148 (H) 65 - 100 mg/dL    Performed by [address], POC    Collection Time: 11/06/20  4:32 PM   Result Value Ref Range    Glucose (POC) 153 (H) 65 - 100 mg/dL    Performed by Kara Bosworth    GLUCOSE, POC    Collection Time: 11/06/20  8:25 PM   Result Value Ref Range    Glucose (POC) 133 (H) 65 - 100 mg/dL    Performed by Eboni Munguia        FOLLOW UP APPOINTMENTS:    Follow-up Information     Follow up With Specialties Details Why Contact Info    Pablo, MD Vincenzo    Patient can only remember the practice name and not the physician           DIET:  Dental soft    ACTIVITY: As tolerated and per PT/OT    WOUND CARE: NA    EQUIPMENT needed: Has needed equipment    DISCHARGE MEDICATIONS:  Current Discharge Medication List      START taking these medications    Details   Ringer's solution,lactated (lactated Ringers) infusion 50 mL/hr by IntraVENous route continuous.   Qty: 1 mL, Refills: 0      acetaminophen (TYLENOL) 325 mg tablet Take 2 Tabs by mouth every six (6) hours as needed for Pain or Fever. Qty: 1 Tab, Refills: 0      polyethylene glycol (MIRALAX) 17 gram packet Take 1 Packet by mouth daily. Qty: 1 Each, Refills: 0      promethazine (PHENERGAN) 12.5 mg tablet Take 1 Tab by mouth every six (6) hours as needed for Nausea. Qty: 1 Tab, Refills: 0      piperacillin-tazobactam 3.375 gram 3.375 g, ADDaptor 1 Device IVPB 3.375 g by IntraVENous route every eight (8) hours. Qty: 1 Dose, Refills: 0      insulin lispro (HUMALOG) 100 unit/mL injection INITIATE CORRECTIVE INSULIN PROTOCOL (ALEXANDRA):  RX ALEXANDRA Normal Sensitivity (Average weight)    AC (before meals), Q6H, and Q4H CORRECTIONAL SCALE only For Blood Sugar (mg/dl) of :             140-199=2 units            200-249=3 units  250-299=5 units  300-349=7 units  350 or greater = Call MD  Give in addition to basal medications. Do Not Hold for NPO    BEDTIME CORRECTIONAL sliding scale when scheduled:  200-249=2 units  250-299=3 units   300-349=4 units  350 or greater = Call MD  Give in addition to basal medications. Do Not Hold for NPO Fast Acting - Administer Immediately - or within 15 minutes of start of meal, if mealtime coverage. Qty: 1 Vial, Refills: 0      glucose 4 gram chewable tablet Take 4 Tabs by mouth as needed for Other (hypoglycemia). Qty: 1 Tab, Refills: 0      glucagon (GLUCAGEN) 1 mg injection 1 mL by IntraMUSCular route as needed for Hypoglycemia. Qty: 1 Vial, Refills: 0      dextrose 10 % solp 125-250 mL by IntraVENous route as needed for Other ((sub for D50 syringe)). Qty: 1 mL, Refills: 0      amLODIPine (NORVASC) 5 mg tablet Take 1 Tab by mouth daily. Qty: 1 Tab, Refills: 0      losartan (COZAAR) 100 mg tablet Take 1 Tab by mouth daily. Qty: 1 Tab, Refills: 0      levoFLOXacin (LEVAQUIN) 500 mg/100 mL 100 mL by IntraVENous route every twenty-four (24) hours.   Qty: 1 mL, Refills: 0      heparin sodium,porcine (heparin, porcine,) 5,000 unit/mL injection 1 mL by SubCUTAneous route every eight (8) hours. Qty: 1 mL, Refills: 0      ondansetron (ZOFRAN) 4 mg/2 mL soln 2 mL by IntraVENous route every six (6) hours as needed for Nausea. Qty: 1 mL, Refills: 0         STOP taking these medications       nateglinide (STARLIX) 120 mg tablet Comments:   Reason for Stopping:         amLODIPine-Olmesartan 5-40 mg tab Comments:   Reason for Stopping:         rosuvastatin (CRESTOR) 10 mg tablet Comments:   Reason for Stopping:         chlorthalidone (HYGROTON) 25 mg tablet Comments:   Reason for Stopping:               NOTIFY YOUR PHYSICIAN FOR ANY OF THE FOLLOWING:   Fever over 101 degrees for 24 hours. Chest pain, shortness of breath, fever, chills, nausea, vomiting, diarrhea, change in mentation, falling, weakness, bleeding. Severe pain or pain not relieved by medications. Or, any other signs or symptoms that you may have questions about. DISPOSITION:    Home With:   OT  PT  HH  RN       Long term SNF/Inpatient Rehab    Independent/assisted living    Hospice   X Other: Transfer to [facility]:     Functional status    Poor    X Deconditioned     Independent      Cognition     Lucid    X Forgetful     Dementia      Catheters/lines (plus indication)    Petit     PICC     PEG    X None      Code status   X Full code     DNR      PHYSICAL EXAMINATION AT DISCHARGE:    Constitutional:  No acute distress, cooperative, pleasant    ENT:  Oral mucosa moist, oropharynx benign. Resp:  CTA bilaterally. No wheezing/rhonchi/rales. No accessory muscle use   CV:  Regular rhythm, normal rate, no murmurs, gallops, rubs    GI:  Soft, non distended, non tender. normoactive bowel sounds, no hepatosplenomegaly     Musculoskeletal:  Warm, 2+ pulses throughout; PREM edema; Junior feet edematous    Neurologic:  Moves all extremities.   AAOx3, CN II-XII reviewed                         Psych:  Good insight, Not anxious nor agitated.          BP (!) 123/56 (BP 1 Location: Right arm, BP Patient Position: At rest)   Pulse 91   Temp 99 °F (37.2 °C)   Resp 22   Ht 6' 2\" (1.88 m)   Wt 122.8 kg (270 lb 12.8 oz)   SpO2 95%   BMI 34.77 kg/m²       CHRONIC MEDICAL DIAGNOSES:  Problem List as of 11/6/2020 Date Reviewed: 10/31/2020          Codes Class Noted - Resolved    Encephalopathy ICD-10-CM: G93.40  ICD-9-CM: 348.30  11/1/2020 - Present        * (Principal) Acute delirium ICD-10-CM: R41.0  ICD-9-CM: 780.09  10/30/2020 - Present              Greater than 30 minutes were spent with the patient on counseling and coordination of care    Signed:   Jose Rangel NP  11/6/2020  8:26 PM

## 2020-11-07 NOTE — PROGRESS NOTES
Brief Ortho Note:. - Pt ankle pain and swelling have not improved; Exam not changed, no erythema, no warmth to Bilat ankle, moderate swelling, severe pain with palpation or passive ROM  - Gout still primary suspected etiology for symptoms. Recommendation was for initiation of steroids per medical team if they agreed - no steroids have been started. WBC normalized, afebrile overnight, no growth on blood cultures. No having R forearm swelling and pain thought to be from thrombophlebitis/infiltration.   - Discussed with Dr. Frausto; if patient does not improve recommend MRI Bilat ankle to define process, eval for effusion; If there is large effusion we will re- attempt aspiration.

## 2020-11-07 NOTE — PROGRESS NOTES
Bedside and Verbal shift change report given to Hung Mora (oncoming nurse) by Asif Guerrero (offgoing nurse). Report included the following information SBAR, Kardex, Intake/Output, MAR, Recent Results, Cardiac Rhythm NSR and Dual Neuro Assessment.

## 2020-11-07 NOTE — PROGRESS NOTES
[address] [facility]  Hospitalist Group                                                                                          Hospitalist Progress Note  Juan Diego Dickerson DO  Answering service: [phone] from in house phone        Date of Service:  2020  NAME:  Allie Zaman  :  1950  MRN:  [id number]      Admission Summary: This is a 62-year-old man with a past medical history significant for type 2 diabetes, hypertension, dyslipidemia, was in his usual state of health until the day of his presentation at the emergency room when the patient developed a change in mental status. The patient reported to his dentist's office and underwent a number of tooth extractions. The procedure finishing at about 10:00 a.m. The patient developed a change in mental status after the procedure. He was monitored at the dentist's office without any improvement in his mental status. The patient was at baseline before the procedure according to report. The patient was also found to be hypoxic with oxygen saturation of 88%. The oxygen saturation improved with supplemental oxygen. Because of the persistent change in mental status, the patient was sent to the emergency room at the [facility] in [place]. The patient has no history of congestive heart failure or respiratory disease. There was no sick contact or contact with any person with COVID-19 virus infection. When the patient arrived at the emergency room, the patient was found to have a fever of 101.9, significant leukocytosis, and elevated lactic acid level. Lumbar puncture was performed. The patient received antibiotics and was referred to the hospitalist service for evaluation for admission. CT scan of the head done on arrival at the emergency room did not show any acute intracranial abnormalities.   No record of prior admission to this hospital.       Interval history / Subjective:   Patient seen and examined. Has significant pain in bilateral ankles, left wrist.  Leukocytosis improving. Persistent fevers. Awaiting bed availability at outside hospital     Assessment & Plan:     Sepsis:   -unclear etiology, possibly pulmonary with bilateral opacities  -ID following, continue broad-spectrum antibiotics  -Leukocytosis improving, fevers persistent but less frequent  -has possible small dental abscess but less likely source for systemic infection  -COVID 19 test negative    Joint swelling:   -has multiple joints involved including bilateral ankles, left wrist  -Prior arthrocentesis attempted on ankle but unsuccessful  -Low suspicion for gout given low uric acid  -Possible inflammatory disease.  negative anti-CCP, WILLIAM. Will initiate on prednisone 20 mg twice daily and scheduled ibuprofen 400 mg 3 times daily with famotidine twice daily  -Left wrist x-ray negative    Left upper extremity swelling:  -Superficial thrombus, can be source of fever    Metabolic encephalopathy: resolved  -Secondary to above    Acute respiratory failure with hypoxia: resolved    Type 2 diabetes with hyperglycemia:  -HA1C 7.5  -Continue SSI, monitor for hypoglycemia with steroids    Hypertension:  Stable, cont amlodipine, cozaar; monitor BP    Dyslipidemia:  cont preadmission medication. Hypokalemia: replete and repeat lab in AM     Code status: Full  DVT prophylaxis: scd    Care Plan discussed with: Patient/Family and Nurse  Anticipated Disposition: Home w/Family  Anticipated Discharge: 24 hours to 48 hours   Patient has been accepted at outside hospital.  He is pending bed availability. Hospital Problems  Date Reviewed: 10/31/2020          Codes Class Noted POA    Encephalopathy ICD-10-CM: G93.40  ICD-9-CM: 348.30  11/1/2020 Unknown        * (Principal) Acute delirium ICD-10-CM: R41.0  ICD-9-CM: 780.09  10/30/2020 Yes                Review of Systems:   A comprehensive review of systems was negative.      Xr Chest Sngl V    Result Date: 10/30/2020  IMPRESSION: Mild central vascular fullness and interstitial prominence. Poor inspiratory effort. No focal infiltrate. .  . Xr Wrist Lt Ap/lat    Result Date: 11/7/2020  IMPRESSION: No acute abnormality. Xr Ankle Lt Ap/lat    Result Date: 11/4/2020  IMPRESSION: 1. Soft tissue swelling with ankle effusion. Degenerative changes of the midfoot and chronic loose bodies distal to the medial malleolus. Xr Ankle Rt Ap/lat    Result Date: 11/4/2020  IMPRESSION: 1. Possible tibiotalar joint effusion. 2. Mild diffuse swelling of the right foot/ankle. 3. Mild chronic degenerative change. 4. No acute osseous abnormality demonstrated. Ct Head Wo Cont    Result Date: 10/30/2020  IMPRESSION: 1. No acute intracranial abnormality. 2. Microvascular ischemic, possible old ischemic and age-related change greater than expected for stated age. 3. Mild sinusitis. Ct Maxillofacial Wo Cont    Result Date: 11/1/2020  IMPRESSION: No fracture. Possible small right mandibular periapical dental abscesses. Cta Chest W Or W Wo Cont    Result Date: 11/1/2020  IMPRESSION: 1. No pulmonary emboli. 2. Groundglass airspace disease, nonspecific. Ct Abd Pelv W Cont    Result Date: 11/1/2020  IMPRESSION: No Acute Disease. Vital Signs:    Last 24hrs VS reviewed since prior progress note. Most recent are:  Visit Vitals  /76 (BP 1 Location: Right arm, BP Patient Position: At rest;Supine)   Pulse 88   Temp (!) 101.9 °F (38.8 °C)   Resp 24   Ht 6' 2\" (1.88 m)   Wt 123.7 kg (272 lb 11.3 oz)   SpO2 94%   BMI 35.01 kg/m²         Intake/Output Summary (Last 24 hours) at 11/7/2020 1008  Last data filed at 11/7/2020 2605  Gross per 24 hour   Intake    Output 400 ml   Net -400 ml        Physical Examination:             Constitutional:  No acute distress, cooperative, pleasant   ENT:  Oral mucosa moist, oropharynx benign. Resp:  CTA bilaterally. No wheezing/rhonchi/rales.  No accessory muscle use   CV:  Regular rhythm, normal rate, no murmurs, gallops, rubs    GI:  Soft, non distended, non tender. normoactive bowel sounds, no hepatosplenomegaly     Musculoskeletal:  Warm, 2+ pulses throughout; bilateral ankles and left wrist swollen, nonpitting, with pain with minimal movement    Neurologic:  Moves all extremities     Psych:  Good insight, Not anxious nor agitated. Data Review:    I personally reviewed  Image and labs      Labs:     Recent Labs     11/07/20 0326 11/06/20 0325   WBC 12.6* 13.4*   HGB 8.8* 8.5*   HCT 27.4* 26.0*    307     Recent Labs     11/07/20 0326 11/06/20 0325 11/05/20  0305   * 134* 135*   K 3.3* 3.4* 3.3*   CL 97 99 98   CO2 26 27 27   BUN 10 12 11   CREA 0.90 0.87 0.98   * 146* 125*   CA 9.0 9.2 8.9     No results for input(s): ALT, AP, TBIL, TBILI, TP, ALB, GLOB, GGT, AML, LPSE in the last 72 hours. No lab exists for component: SGOT, GPT, AMYP, HLPSE  No results for input(s): INR, PTP, APTT, INREXT, INREXT in the last 72 hours. No results for input(s): FE, TIBC, PSAT, FERR in the last 72 hours. No results found for: FOL, RBCF   No results for input(s): PH, PCO2, PO2 in the last 72 hours. No results for input(s): CPK, CKNDX, TROIQ in the last 72 hours.     No lab exists for component: CPKMB  No results found for: CHOL, CHOLX, CHLST, CHOLV, HDL, HDLP, LDL, LDLC, DLDLP, TGLX, TRIGL, TRIGP, CHHD, CHHDX  Lab Results   Component Value Date/Time    Glucose (POC) 143 (H) 11/07/2020 03:48 PM    Glucose (POC) 144 (H) 11/07/2020 11:51 AM    Glucose (POC) 153 (H) 11/07/2020 07:29 AM    Glucose (POC) 133 (H) 11/06/2020 08:25 PM    Glucose (POC) 153 (H) 11/06/2020 04:32 PM     Lab Results   Component Value Date/Time    Color YELLOW/STRAW 10/31/2020 03:16 AM    Appearance CLEAR 10/31/2020 03:16 AM    Specific gravity 1.030 10/31/2020 03:16 AM    Specific gravity PATIENT DISCHARGED BEFORE SAMPLE COLLECTED 10/30/2020 04:15 PM    pH (UA) 5.0 10/31/2020 03:16 AM    Protein Negative 10/31/2020 03:16 AM    Glucose Negative 10/31/2020 03:16 AM    Ketone Negative 10/31/2020 03:16 AM    Bilirubin Negative 10/31/2020 03:16 AM    Urobilinogen 0.2 10/31/2020 03:16 AM    Nitrites Negative 10/31/2020 03:16 AM    Leukocyte Esterase Negative 10/31/2020 03:16 AM    Epithelial cells PATIENT DISCHARGED BEFORE SAMPLE COLLECTED 10/30/2020 04:15 PM    Bacteria PATIENT DISCHARGED BEFORE SAMPLE COLLECTED 10/30/2020 04:15 PM    WBC PATIENT DISCHARGED BEFORE SAMPLE COLLECTED 10/30/2020 04:15 PM    RBC PATIENT DISCHARGED BEFORE SAMPLE COLLECTED 10/30/2020 04:15 PM         Medications Reviewed:     Current Facility-Administered Medications   Medication Dose Route Frequency    magnesium oxide (MAG-OX) tablet 400 mg  400 mg Oral BID    predniSONE (DELTASONE) tablet 20 mg  20 mg Oral BID    famotidine (PEPCID) tablet 20 mg  20 mg Oral DAILY    ibuprofen (MOTRIN) tablet 400 mg  400 mg Oral TID    heparin (porcine) injection 5,000 Units  5,000 Units SubCUTAneous Q8H    amLODIPine (NORVASC) tablet 5 mg  5 mg Oral DAILY    And    losartan (COZAAR) tablet 100 mg  100 mg Oral DAILY    levoFLOXacin (LEVAQUIN) 500 mg in D5W IVPB  500 mg IntraVENous Q24H    sodium chloride (NS) flush 5-10 mL  5-10 mL IntraVENous PRN    sodium chloride (NS) flush 5-40 mL  5-40 mL IntraVENous Q8H    sodium chloride (NS) flush 5-40 mL  5-40 mL IntraVENous PRN    acetaminophen (TYLENOL) tablet 650 mg  650 mg Oral Q6H PRN    Or    acetaminophen (TYLENOL) suppository 650 mg  650 mg Rectal Q6H PRN    polyethylene glycol (MIRALAX) packet 17 g  17 g Oral DAILY PRN    promethazine (PHENERGAN) tablet 12.5 mg  12.5 mg Oral Q6H PRN    Or    ondansetron (ZOFRAN) injection 4 mg  4 mg IntraVENous Q6H PRN    piperacillin-tazobactam (ZOSYN) 3.375 g in 0.9% sodium chloride (MBP/ADV) 100 mL  3.375 g IntraVENous Q8H    insulin lispro (HUMALOG) injection   SubCUTAneous AC&HS    glucose chewable tablet 16 g  4 Tab Oral PRN    glucagon (GLUCAGEN) injection 1 mg  1 mg IntraMUSCular PRN    rosuvastatin (CRESTOR) tablet 10 mg  10 mg Oral QHS    dextrose 10 % infusion 125-250 mL  125-250 mL IntraVENous PRN    lactated Ringers infusion  50 mL/hr IntraVENous CONTINUOUS     ______________________________________________________________________  EXPECTED LENGTH OF STAY: 4d 19h  ACTUAL LENGTH OF STAY:          [address], DO

## 2020-11-08 LAB
ANION GAP SERPL CALC-SCNC: 9 MMOL/L (ref 5–15)
BACTERIA SPEC CULT: NORMAL
BUN SERPL-MCNC: 12 MG/DL (ref 6–20)
BUN/CREAT SERPL: 14 (ref 12–20)
CALCIUM SERPL-MCNC: 8.9 MG/DL (ref 8.5–10.1)
CHLORIDE SERPL-SCNC: 98 MMOL/L (ref 97–108)
CO2 SERPL-SCNC: 27 MMOL/L (ref 21–32)
CREAT SERPL-MCNC: 0.86 MG/DL (ref 0.7–1.3)
CRP SERPL-MCNC: 22.1 MG/DL (ref 0–0.6)
ERYTHROCYTE [DISTWIDTH] IN BLOOD BY AUTOMATED COUNT: 13.1 % (ref 11.5–14.5)
ERYTHROCYTE [SEDIMENTATION RATE] IN BLOOD: 127 MM/HR (ref 0–20)
FERRITIN SERPL-MCNC: 576 NG/ML (ref 26–388)
GLUCOSE BLD STRIP.AUTO-MCNC: 137 MG/DL (ref 65–100)
GLUCOSE BLD STRIP.AUTO-MCNC: 138 MG/DL (ref 65–100)
GLUCOSE BLD STRIP.AUTO-MCNC: 284 MG/DL (ref 65–100)
GLUCOSE SERPL-MCNC: 159 MG/DL (ref 65–100)
HCT VFR BLD AUTO: 29.6 % (ref 36.6–50.3)
HGB BLD-MCNC: 9.5 G/DL (ref 12.1–17)
MAGNESIUM SERPL-MCNC: 2.2 MG/DL (ref 1.6–2.4)
MCH RBC QN AUTO: 28 PG (ref 26–34)
MCHC RBC AUTO-ENTMCNC: 32.1 G/DL (ref 30–36.5)
MCV RBC AUTO: 87.3 FL (ref 80–99)
NRBC # BLD: 0 K/UL (ref 0–0.01)
NRBC BLD-RTO: 0 PER 100 WBC
PHOSPHATE SERPL-MCNC: 4 MG/DL (ref 2.6–4.7)
PLATELET # BLD AUTO: 382 K/UL (ref 150–400)
PMV BLD AUTO: 10.5 FL (ref 8.9–12.9)
POTASSIUM SERPL-SCNC: 4.2 MMOL/L (ref 3.5–5.1)
RBC # BLD AUTO: 3.39 M/UL (ref 4.1–5.7)
SERVICE CMNT-IMP: ABNORMAL
SERVICE CMNT-IMP: NORMAL
SODIUM SERPL-SCNC: 134 MMOL/L (ref 136–145)
WBC # BLD AUTO: 16.7 K/UL (ref 4.1–11.1)

## 2020-11-08 PROCEDURE — 74011250636 HC RX REV CODE- 250/636: Performed by: INTERNAL MEDICINE

## 2020-11-08 PROCEDURE — 80048 BASIC METABOLIC PNL TOTAL CA: CPT

## 2020-11-08 PROCEDURE — 86140 C-REACTIVE PROTEIN: CPT

## 2020-11-08 PROCEDURE — 65660000000 HC RM CCU STEPDOWN

## 2020-11-08 PROCEDURE — 85027 COMPLETE CBC AUTOMATED: CPT

## 2020-11-08 PROCEDURE — 82728 ASSAY OF FERRITIN: CPT

## 2020-11-08 PROCEDURE — 74011250636 HC RX REV CODE- 250/636: Performed by: HOSPITALIST

## 2020-11-08 PROCEDURE — 83735 ASSAY OF MAGNESIUM: CPT

## 2020-11-08 PROCEDURE — 74011636637 HC RX REV CODE- 636/637: Performed by: INTERNAL MEDICINE

## 2020-11-08 PROCEDURE — 87040 BLOOD CULTURE FOR BACTERIA: CPT

## 2020-11-08 PROCEDURE — 36415 COLL VENOUS BLD VENIPUNCTURE: CPT

## 2020-11-08 PROCEDURE — 74011250636 HC RX REV CODE- 250/636: Performed by: NURSE PRACTITIONER

## 2020-11-08 PROCEDURE — 74011250637 HC RX REV CODE- 250/637: Performed by: INTERNAL MEDICINE

## 2020-11-08 PROCEDURE — 82962 GLUCOSE BLOOD TEST: CPT

## 2020-11-08 PROCEDURE — 84100 ASSAY OF PHOSPHORUS: CPT

## 2020-11-08 PROCEDURE — 36600 WITHDRAWAL OF ARTERIAL BLOOD: CPT

## 2020-11-08 PROCEDURE — 85652 RBC SED RATE AUTOMATED: CPT

## 2020-11-08 PROCEDURE — 74011250637 HC RX REV CODE- 250/637: Performed by: HOSPITALIST

## 2020-11-08 PROCEDURE — 74011000258 HC RX REV CODE- 258: Performed by: INTERNAL MEDICINE

## 2020-11-08 RX ADMIN — HEPARIN SODIUM 5000 UNITS: 5000 INJECTION INTRAVENOUS; SUBCUTANEOUS at 22:28

## 2020-11-08 RX ADMIN — Medication 400 MG: at 09:24

## 2020-11-08 RX ADMIN — Medication 400 MG: at 17:30

## 2020-11-08 RX ADMIN — METHYLPREDNISOLONE SODIUM SUCCINATE 125 MG: 125 INJECTION, POWDER, FOR SOLUTION INTRAMUSCULAR; INTRAVENOUS at 14:20

## 2020-11-08 RX ADMIN — Medication 10 ML: at 14:29

## 2020-11-08 RX ADMIN — INSULIN LISPRO 5 UNITS: 100 INJECTION, SOLUTION INTRAVENOUS; SUBCUTANEOUS at 17:30

## 2020-11-08 RX ADMIN — Medication 10 ML: at 22:00

## 2020-11-08 RX ADMIN — Medication 10 ML: at 06:00

## 2020-11-08 RX ADMIN — HEPARIN SODIUM 5000 UNITS: 5000 INJECTION INTRAVENOUS; SUBCUTANEOUS at 05:29

## 2020-11-08 RX ADMIN — METHYLPREDNISOLONE SODIUM SUCCINATE 125 MG: 125 INJECTION, POWDER, FOR SOLUTION INTRAMUSCULAR; INTRAVENOUS at 17:30

## 2020-11-08 RX ADMIN — PIPERACILLIN AND TAZOBACTAM 3.38 G: 3; .375 INJECTION, POWDER, LYOPHILIZED, FOR SOLUTION INTRAVENOUS at 13:18

## 2020-11-08 RX ADMIN — PREDNISONE 20 MG: 20 TABLET ORAL at 09:24

## 2020-11-08 RX ADMIN — LOSARTAN POTASSIUM 100 MG: 50 TABLET, FILM COATED ORAL at 09:24

## 2020-11-08 RX ADMIN — PIPERACILLIN AND TAZOBACTAM 3.38 G: 3; .375 INJECTION, POWDER, LYOPHILIZED, FOR SOLUTION INTRAVENOUS at 05:29

## 2020-11-08 RX ADMIN — SODIUM CHLORIDE, SODIUM LACTATE, POTASSIUM CHLORIDE, AND CALCIUM CHLORIDE 50 ML/HR: 600; 310; 30; 20 INJECTION, SOLUTION INTRAVENOUS at 07:48

## 2020-11-08 RX ADMIN — ROSUVASTATIN CALCIUM 10 MG: 10 TABLET, FILM COATED ORAL at 22:00

## 2020-11-08 RX ADMIN — HEPARIN SODIUM 5000 UNITS: 5000 INJECTION INTRAVENOUS; SUBCUTANEOUS at 14:20

## 2020-11-08 RX ADMIN — FAMOTIDINE 20 MG: 20 TABLET ORAL at 09:24

## 2020-11-08 RX ADMIN — LEVOFLOXACIN 500 MG: 5 INJECTION, SOLUTION INTRAVENOUS at 19:00

## 2020-11-08 RX ADMIN — AMLODIPINE BESYLATE 5 MG: 5 TABLET ORAL at 09:24

## 2020-11-08 NOTE — PROGRESS NOTES
Problem: Risk for Spread of Infection  Goal: Prevent transmission of infectious organism to others  Description: Prevent the transmission of infectious organisms to other patients, staff members, and visitors. Outcome: Progressing Towards Goal     Problem: Falls - Risk of  Goal: *Absence of Falls  Description: Document Kavya Lundberg Fall Risk and appropriate interventions in the flowsheet. Outcome: Progressing Towards Goal  Note: Fall Risk Interventions:  Mobility Interventions: Bed/chair exit alarm, Communicate number of staff needed for ambulation/transfer, OT consult for ADLs, Patient to call before getting OOB, PT Consult for mobility concerns, PT Consult for assist device competence    Mentation Interventions: Bed/chair exit alarm, Door open when patient unattended, Evaluate medications/consider consulting pharmacy, More frequent rounding    Medication Interventions: Patient to call before getting OOB, Evaluate medications/consider consulting pharmacy, Bed/chair exit alarm    Elimination Interventions: Call light in reach, Toileting schedule/hourly rounds              Problem: Pressure Injury - Risk of  Goal: *Prevention of pressure injury  Description: Document Sulaiman Scale and appropriate interventions in the flowsheet. Outcome: Progressing Towards Goal  Note: Pressure Injury Interventions:  Sensory Interventions: Float heels, Keep linens dry and wrinkle-free, Minimize linen layers, Pressure redistribution bed/mattress (bed type), Turn and reposition approx.  every two hours (pillows and wedges if needed), Avoid rigorous massage over bony prominences, Assess need for specialty bed, Assess changes in LOC    Moisture Interventions: Absorbent underpads, Apply protective barrier, creams and emollients, Assess need for specialty bed, Minimize layers, Limit adult briefs, Internal/External urinary devices    Activity Interventions: PT/OT evaluation, Pressure redistribution bed/mattress(bed type), Increase time out of bed    Mobility Interventions: PT/OT evaluation, Pressure redistribution bed/mattress (bed type), HOB 30 degrees or less, Float heels    Nutrition Interventions: Document food/fluid/supplement intake    Friction and Shear Interventions: Minimize layers, Lift sheet, HOB 30 degrees or less, Feet elevated on foot rest, Apply protective barrier, creams and emollients                Problem: Patient Education: Go to Patient Education Activity  Goal: Patient/Family Education  Outcome: Progressing Towards Goal     Problem: General Medical Care Plan  Goal: *Fluid volume balance  Outcome: Progressing Towards Goal  Goal: *Optimize nutritional status  Outcome: Progressing Towards Goal  Goal: *Anxiety reduced or absent  Outcome: Progressing Towards Goal

## 2020-11-08 NOTE — ROUTINE PROCESS
Bedside shift change report given to Mikaela Loza (oncoming nurse) by Master Ackerman (offgoing nurse). Report included the following information SBAR, Kardex, MAR, Cardiac Rhythm ST and Dual Neuro Assessment.

## 2020-11-08 NOTE — PROGRESS NOTES
Bedside and Verbal shift change report given to Erendira Mcclain RN (oncoming nurse) by Ivory Solorzano RN (offgoing nurse). Report included the following information SBAR, Kardex, Intake/Output, MAR, Recent Results, Med Rec Status, Cardiac Rhythm NSR, Alarm Parameters  and Dual Neuro Assessment.

## 2020-11-08 NOTE — PROGRESS NOTES
[facility] [facility]  Hospitalist Group                                                                                          Hospitalist Progress Note  Neelam Gomez DO  Answering service: [phone] from in house phone        Date of Service:  2020  NAME:  Zheng Soto  :  1950  MRN:  [id number]      Admission Summary: This is a 53-year-old man with a past medical history significant for type 2 diabetes, hypertension, dyslipidemia, was in his usual state of health until the day of his presentation at the emergency room when the patient developed a change in mental status. The patient reported to his dentist's office and underwent a number of tooth extractions. The procedure finishing at about 10:00 a.m. The patient developed a change in mental status after the procedure. He was monitored at the dentist's office without any improvement in his mental status. The patient was at baseline before the procedure according to report. The patient was also found to be hypoxic with oxygen saturation of 88%. The oxygen saturation improved with supplemental oxygen. Because of the persistent change in mental status, the patient was sent to the emergency room at the [facility] in [place]. The patient has no history of congestive heart failure or respiratory disease. There was no sick contact or contact with any person with COVID-19 virus infection. When the patient arrived at the emergency room, the patient was found to have a fever of 101.9, significant leukocytosis, and elevated lactic acid level. Lumbar puncture was performed. The patient received antibiotics and was referred to the hospitalist service for evaluation for admission. CT scan of the head done on arrival at the emergency room did not show any acute intracranial abnormalities. No record of prior admission to this hospital.       Interval history / Subjective:    Follow up fevers. Patient seen and examined. States ankles mildly improved. Initiated on oral prednisone yesterday. Breathing stable. Discussed with patient's son via phone. Also discussed case with rheumatology. Recommend CRP, ferritin, Sed rate, solumedrol 125 q 6 hours. Assessment & Plan:     Sepsis:   -unclear etiology, suspected pulmonary origin with bilateral opacities on CT scan   -ID following, will discontinue zosyn and remain levofloxacin   -Leukocytosis improving, fevers persistent but less frequent  -has possible small dental abscess but less likely source for systemic infection  -COVID 19 test negative    Joint swelling:   -has multiple joints involved including bilateral ankles, left wrist  -discussed with rheumatology 11/8. Concern for Still's disease. Order ferritin, CRP, Sed Rate. Initiate on methylpred 125 mg q 6 hours- okay by ID  -Prior arthrocentesis attempted on ankle but unsuccessful  -Low suspicion for gout given low uric acid  -hold NSAIDs due to prior history of renal dysfunction   -Left wrist x-ray negative    Left upper extremity swelling:  -Superficial thrombus, can be source of fever    Metabolic encephalopathy: resolved  -Secondary to above    Acute respiratory failure with hypoxia: resolved    Type 2 diabetes with hyperglycemia:  -HA1C 7.5  -Continue SSI, monitor for hypoglycemia with steroids    Hypertension:  Stable, cont amlodipine, cozaar; monitor BP    Dyslipidemia:  cont preadmission medication. Hypokalemia: replete and repeat lab in AM     Code status: Full  DVT prophylaxis: scd    Care Plan discussed with: Patient/Family and Nurse  Anticipated Disposition: Home w/Family  Anticipated Discharge: 24 hours to 48 hours   Patient has been accepted at outside hospital.  He is pending bed availability.      Hospital Problems  Date Reviewed: 10/31/2020          Codes Class Noted POA    Encephalopathy ICD-10-CM: G93.40  ICD-9-CM: 348.30  11/1/2020 Unknown        * (Principal) Acute delirium ICD-10-CM: R41.0  ICD-9-CM: 780.09  10/30/2020 Yes                Review of Systems:   A comprehensive review of systems was negative. Xr Chest Sngl V    Result Date: 10/30/2020  IMPRESSION: Mild central vascular fullness and interstitial prominence. Poor inspiratory effort. No focal infiltrate. .  . Xr Wrist Lt Ap/lat    Result Date: 11/7/2020  IMPRESSION: No acute abnormality. Xr Ankle Lt Ap/lat    Result Date: 11/4/2020  IMPRESSION: 1. Soft tissue swelling with ankle effusion. Degenerative changes of the midfoot and chronic loose bodies distal to the medial malleolus. Xr Ankle Rt Ap/lat    Result Date: 11/4/2020  IMPRESSION: 1. Possible tibiotalar joint effusion. 2. Mild diffuse swelling of the right foot/ankle. 3. Mild chronic degenerative change. 4. No acute osseous abnormality demonstrated. Ct Head Wo Cont    Result Date: 10/30/2020  IMPRESSION: 1. No acute intracranial abnormality. 2. Microvascular ischemic, possible old ischemic and age-related change greater than expected for stated age. 3. Mild sinusitis. Ct Maxillofacial Wo Cont    Result Date: 11/1/2020  IMPRESSION: No fracture. Possible small right mandibular periapical dental abscesses. Cta Chest W Or W Wo Cont    Result Date: 11/1/2020  IMPRESSION: 1. No pulmonary emboli. 2. Groundglass airspace disease, nonspecific. Ct Abd Pelv W Cont    Result Date: 11/1/2020  IMPRESSION: No Acute Disease. Vital Signs:    Last 24hrs VS reviewed since prior progress note.  Most recent are:  Visit Vitals  /64 (BP 1 Location: Right arm, BP Patient Position: At rest;Sitting)   Pulse 74   Temp 97.1 °F (36.2 °C)   Resp 16   Ht 6' 2\" (1.88 m)   Wt 98.6 kg (217 lb 6 oz)   SpO2 100%   BMI 27.91 kg/m²         Intake/Output Summary (Last 24 hours) at 11/8/2020 1402  Last data filed at 11/8/2020 0750  Gross per 24 hour   Intake    Output 950 ml   Net -950 ml        Physical Examination:             Constitutional:  No acute distress, cooperative, pleasant   ENT:  Oral mucosa moist, oropharynx benign. Resp:  CTA bilaterally. No wheezing/rhonchi/rales. No accessory muscle use   CV:  Regular rhythm, normal rate, no murmurs, gallops, rubs    GI:  Soft, non distended, non tender. normoactive bowel sounds, no hepatosplenomegaly     Musculoskeletal:  Warm, 2+ pulses throughout; bilateral ankles and left wrist swollen, nonpitting, with pain with minimal movement    Neurologic:  Moves all extremities     Psych:  Good insight, Not anxious nor agitated. Data Review:    I personally reviewed  Image and labs      Labs:     Recent Labs     11/08/20 0340 11/07/20 0326   WBC 16.7* 12.6*   HGB 9.5* 8.8*   HCT 29.6* 27.4*    352     Recent Labs     11/08/20 0340 11/07/20 0326 11/06/20 0325   * 134* 134*   K 4.2 3.3* 3.4*   CL 98 97 99   CO2 27 26 27   BUN 12 10 12   CREA 0.86 0.90 0.87   * 123* 146*   CA 8.9 9.0 9.2   MG 2.2  --   --    PHOS 4.0  --   --      No results for input(s): ALT, AP, TBIL, TBILI, TP, ALB, GLOB, GGT, AML, LPSE in the last 72 hours. No lab exists for component: SGOT, GPT, AMYP, HLPSE  No results for input(s): INR, PTP, APTT, INREXT, INREXT in the last 72 hours. No results for input(s): FE, TIBC, PSAT, FERR in the last 72 hours. No results found for: FOL, RBCF   No results for input(s): PH, PCO2, PO2 in the last 72 hours. No results for input(s): CPK, CKNDX, TROIQ in the last 72 hours.     No lab exists for component: CPKMB  No results found for: CHOL, CHOLX, CHLST, CHOLV, HDL, HDLP, LDL, LDLC, DLDLP, TGLX, TRIGL, TRIGP, CHHD, CHHDX  Lab Results   Component Value Date/Time    Glucose (POC) 137 (H) 11/08/2020 11:43 AM    Glucose (POC) 138 (H) 11/08/2020 07:53 AM    Glucose (POC) 161 (H) 11/07/2020 09:22 PM    Glucose (POC) 143 (H) 11/07/2020 03:48 PM    Glucose (POC) 144 (H) 11/07/2020 11:51 AM     Lab Results   Component Value Date/Time    Color YELLOW/STRAW 10/31/2020 03:16 AM Appearance CLEAR 10/31/2020 03:16 AM    Specific gravity 1.030 10/31/2020 03:16 AM    Specific gravity PATIENT DISCHARGED BEFORE SAMPLE COLLECTED 10/30/2020 04:15 PM    pH (UA) 5.0 10/31/2020 03:16 AM    Protein Negative 10/31/2020 03:16 AM    Glucose Negative 10/31/2020 03:16 AM    Ketone Negative 10/31/2020 03:16 AM    Bilirubin Negative 10/31/2020 03:16 AM    Urobilinogen 0.2 10/31/2020 03:16 AM    Nitrites Negative 10/31/2020 03:16 AM    Leukocyte Esterase Negative 10/31/2020 03:16 AM    Epithelial cells PATIENT DISCHARGED BEFORE SAMPLE COLLECTED 10/30/2020 04:15 PM    Bacteria PATIENT DISCHARGED BEFORE SAMPLE COLLECTED 10/30/2020 04:15 PM    WBC PATIENT DISCHARGED BEFORE SAMPLE COLLECTED 10/30/2020 04:15 PM    RBC PATIENT DISCHARGED BEFORE SAMPLE COLLECTED 10/30/2020 04:15 PM         Medications Reviewed:     Current Facility-Administered Medications   Medication Dose Route Frequency    methylPREDNISolone (PF) (Solu-MEDROL) injection 125 mg  125 mg IntraVENous Q6H    magnesium oxide (MAG-OX) tablet 400 mg  400 mg Oral BID    famotidine (PEPCID) tablet 20 mg  20 mg Oral DAILY    [Held by provider] ibuprofen (MOTRIN) tablet 400 mg  400 mg Oral TID    heparin (porcine) injection 5,000 Units  5,000 Units SubCUTAneous Q8H    amLODIPine (NORVASC) tablet 5 mg  5 mg Oral DAILY    And    losartan (COZAAR) tablet 100 mg  100 mg Oral DAILY    levoFLOXacin (LEVAQUIN) 500 mg in D5W IVPB  500 mg IntraVENous Q24H    sodium chloride (NS) flush 5-10 mL  5-10 mL IntraVENous PRN    sodium chloride (NS) flush 5-40 mL  5-40 mL IntraVENous Q8H    sodium chloride (NS) flush 5-40 mL  5-40 mL IntraVENous PRN    acetaminophen (TYLENOL) tablet 650 mg  650 mg Oral Q6H PRN    Or    acetaminophen (TYLENOL) suppository 650 mg  650 mg Rectal Q6H PRN    polyethylene glycol (MIRALAX) packet 17 g  17 g Oral DAILY PRN    promethazine (PHENERGAN) tablet 12.5 mg  12.5 mg Oral Q6H PRN    Or    ondansetron (ZOFRAN) injection 4 mg  4 mg IntraVENous Q6H PRN    piperacillin-tazobactam (ZOSYN) 3.375 g in 0.9% sodium chloride (MBP/ADV) 100 mL  3.375 g IntraVENous Q8H    insulin lispro (HUMALOG) injection   SubCUTAneous AC&HS    glucose chewable tablet 16 g  4 Tab Oral PRN    glucagon (GLUCAGEN) injection 1 mg  1 mg IntraMUSCular PRN    rosuvastatin (CRESTOR) tablet 10 mg  10 mg Oral QHS    dextrose 10 % infusion 125-250 mL  125-250 mL IntraVENous PRN    lactated Ringers infusion  50 mL/hr IntraVENous CONTINUOUS     ______________________________________________________________________  EXPECTED LENGTH OF STAY: 4d 19h  ACTUAL LENGTH OF STAY:          [facility]

## 2020-11-08 NOTE — PROGRESS NOTES
1427: Attempted to draw blood, attempt unsuccessful. Dr. Kyra Bradshaw notified. Order received to obtain labs via art. stick. RRT paged for assistance. 1600: Spoke with [facility] transfer center who reported no available bed at this time. 2000: Bedside shift change report given to Augusto Waddell RN (oncoming nurse) by Lakisha Quispe  (offgoing nurse). Report included the following information SBAR, Kardex, Intake/Output, Recent Results, Cardiac Rhythm SR w/ PVCs and Dual Neuro Assessment.

## 2020-11-08 NOTE — PROGRESS NOTES
ID Progress Note  2020     Zosyn  October 31present  Clifford November 3present    Subjective:     Fevers yesterday. His left wrist pain and bilateral ankle pain are better. Does not have a rash. He does not have any shortness of breath. He has no abdominal pain headache or sore throat. ROS: No anaphylaxis, seizures, syncope, hematemesis, hematochezia     Objective:     Vitals:   Visit Vitals  /64 (BP 1 Location: Right arm, BP Patient Position: At rest;Sitting)   Pulse 74   Temp 97.1 °F (36.2 °C)   Resp 16   Ht 6' 2\" (1.88 m)   Wt 98.6 kg (217 lb 6 oz)   SpO2 100%   BMI 27.91 kg/m²        Tmax:  Temp (24hrs), Av.5 °F (36.9 °C), Min:97.1 °F (36.2 °C), Max:101 °F (38.3 °C)      Exam:    Not in distress  Pink conjunctivae, anicteric sclerae  No cervical lymphadenopathy  Heart: s1, s2, RRR, no murmurs rubs or clicks  Abdomen: soft nontender, no guarding or rebound  The left wrist has decreased swelling. I can now extend and flex it without pain. There is still some tenderness on bilateral ankles, but it is significantly less than previous days. Speech fluent     Labs:   Lab Results   Component Value Date/Time    WBC 16.7 (H) 2020 03:40 AM    HGB 9.5 (L) 2020 03:40 AM    HCT 29.6 (L) 2020 03:40 AM    PLATELET 053  03:40 AM    MCV 87.3 2020 03:40 AM     Lab Results   Component Value Date/Time    Sodium 134 (L) 2020 03:40 AM    Potassium 4.2 2020 03:40 AM    Chloride 98 2020 03:40 AM    CO2 27 2020 03:40 AM    Anion gap 9 2020 03:40 AM    Glucose 159 (H) 2020 03:40 AM    BUN 12 2020 03:40 AM    Creatinine 0.86 2020 03:40 AM    BUN/Creatinine ratio 14 2020 03:40 AM    GFR est AA >60 2020 03:40 AM    GFR est non-AA >60 2020 03:40 AM    Calcium 8.9 2020 03:40 AM    Bilirubin, total 0.7 10/31/2020 03:16 AM    Alk.  phosphatase 55 10/31/2020 03:16 AM    Protein, total 6.6 10/31/2020 03:16 AM Albumin 3.0 (L) 10/31/2020 03:16 AM    Globulin 3.6 10/31/2020 03:16 AM    A-G Ratio 0.8 (L) 10/31/2020 03:16 AM    ALT (SGPT) 19 10/31/2020 03:16 AM       Rheumatoid factor negative   Omayra neg   Mycoplasma IgG very high, IgM negative, but respiratory pcr for mycoplasma negative. Assessment:     #1 fever     #2 ankle tenderness     #3 groundglass opacities on CT - covid neg x 2, respiratory viral panel neg      #4 diabetes     #5 hypertension     #6 dyslipidemia    #7 superficial thrombus distal cephalic vein    Recommendations: The patient continues to have fever. His main symptom is tenderness and warmth on bilateral ankles. Arthrocentesis was done but was not able to obtain any fluid. Steroids started by primary team.  There is some suspicion that he could have stills disease. I am okay with keeping him on steroids especially since I do not think the ankle swelling and pain are due to an infection. He also has a superficial distal cephalic vein thrombosis. The IV line that was in the left forearm area has been removed. This by itself can cause fever.       [address] [place] [address] Dr Deb Fernandez MD

## 2020-11-09 LAB
ANION GAP SERPL CALC-SCNC: 7 MMOL/L (ref 5–15)
BASOPHILS # BLD: 0 K/UL (ref 0–0.1)
BASOPHILS NFR BLD: 0 % (ref 0–1)
BUN SERPL-MCNC: 19 MG/DL (ref 6–20)
BUN/CREAT SERPL: 19 (ref 12–20)
CALCIUM SERPL-MCNC: 9.2 MG/DL (ref 8.5–10.1)
CHLORIDE SERPL-SCNC: 99 MMOL/L (ref 97–108)
CO2 SERPL-SCNC: 26 MMOL/L (ref 21–32)
CREAT SERPL-MCNC: 1 MG/DL (ref 0.7–1.3)
DIFFERENTIAL METHOD BLD: ABNORMAL
EOSINOPHIL # BLD: 0 K/UL (ref 0–0.4)
EOSINOPHIL NFR BLD: 0 % (ref 0–7)
ERYTHROCYTE [DISTWIDTH] IN BLOOD BY AUTOMATED COUNT: 13 % (ref 11.5–14.5)
FLUID CULTURE, SPNG2: NORMAL
GLUCOSE BLD STRIP.AUTO-MCNC: 310 MG/DL (ref 65–100)
GLUCOSE BLD STRIP.AUTO-MCNC: 337 MG/DL (ref 65–100)
GLUCOSE BLD STRIP.AUTO-MCNC: 344 MG/DL (ref 65–100)
GLUCOSE BLD STRIP.AUTO-MCNC: 351 MG/DL (ref 65–100)
GLUCOSE SERPL-MCNC: 279 MG/DL (ref 65–100)
HCT VFR BLD AUTO: 29.5 % (ref 36.6–50.3)
HGB BLD-MCNC: 9.8 G/DL (ref 12.1–17)
IMM GRANULOCYTES # BLD AUTO: 0.2 K/UL (ref 0–0.04)
IMM GRANULOCYTES NFR BLD AUTO: 1 % (ref 0–0.5)
L PNEUMO POOL 1-6 IGM SER QL: NORMAL
LYMPHOCYTES # BLD: 1.1 K/UL (ref 0.8–3.5)
LYMPHOCYTES NFR BLD: 5 % (ref 12–49)
MCH RBC QN AUTO: 28.4 PG (ref 26–34)
MCHC RBC AUTO-ENTMCNC: 33.2 G/DL (ref 30–36.5)
MCV RBC AUTO: 85.5 FL (ref 80–99)
MONOCYTES # BLD: 0.2 K/UL (ref 0–1)
MONOCYTES NFR BLD: 1 % (ref 5–13)
NEUTS SEG # BLD: 20.5 K/UL (ref 1.8–8)
NEUTS SEG NFR BLD: 93 % (ref 32–75)
NRBC # BLD: 0 K/UL (ref 0–0.01)
NRBC BLD-RTO: 0 PER 100 WBC
ORGANISM ID, SPNG3: NORMAL
PLATELET # BLD AUTO: 481 K/UL (ref 150–400)
PLEASE NOTE, SPNG4: NORMAL
PMV BLD AUTO: 10 FL (ref 8.9–12.9)
POTASSIUM SERPL-SCNC: 3.9 MMOL/L (ref 3.5–5.1)
RBC # BLD AUTO: 3.45 M/UL (ref 4.1–5.7)
RBC MORPH BLD: ABNORMAL
S PNEUM AG SPEC QL LA: NEGATIVE
SERVICE CMNT-IMP: ABNORMAL
SODIUM SERPL-SCNC: 132 MMOL/L (ref 136–145)
SPECIMEN SOURCE: NORMAL
SPECIMEN, SPNG1: NORMAL
WBC # BLD AUTO: 22 K/UL (ref 4.1–11.1)

## 2020-11-09 PROCEDURE — 82962 GLUCOSE BLOOD TEST: CPT

## 2020-11-09 PROCEDURE — 85025 COMPLETE CBC W/AUTO DIFF WBC: CPT

## 2020-11-09 PROCEDURE — 74011250637 HC RX REV CODE- 250/637: Performed by: INTERNAL MEDICINE

## 2020-11-09 PROCEDURE — 74011250637 HC RX REV CODE- 250/637: Performed by: HOSPITALIST

## 2020-11-09 PROCEDURE — 74011250636 HC RX REV CODE- 250/636: Performed by: INTERNAL MEDICINE

## 2020-11-09 PROCEDURE — 74011636637 HC RX REV CODE- 636/637: Performed by: INTERNAL MEDICINE

## 2020-11-09 PROCEDURE — 65660000000 HC RM CCU STEPDOWN

## 2020-11-09 PROCEDURE — 80048 BASIC METABOLIC PNL TOTAL CA: CPT

## 2020-11-09 PROCEDURE — 74011250636 HC RX REV CODE- 250/636: Performed by: NURSE PRACTITIONER

## 2020-11-09 PROCEDURE — 36415 COLL VENOUS BLD VENIPUNCTURE: CPT

## 2020-11-09 RX ORDER — INSULIN GLARGINE 100 [IU]/ML
10 INJECTION, SOLUTION SUBCUTANEOUS ONCE
Status: COMPLETED | OUTPATIENT
Start: 2020-11-09 | End: 2020-11-09

## 2020-11-09 RX ORDER — PREDNISONE 20 MG/1
20 TABLET ORAL
Status: DISCONTINUED | OUTPATIENT
Start: 2020-11-10 | End: 2020-11-10 | Stop reason: HOSPADM

## 2020-11-09 RX ADMIN — Medication 10 ML: at 14:28

## 2020-11-09 RX ADMIN — HEPARIN SODIUM 5000 UNITS: 5000 INJECTION INTRAVENOUS; SUBCUTANEOUS at 22:17

## 2020-11-09 RX ADMIN — METHYLPREDNISOLONE SODIUM SUCCINATE 125 MG: 125 INJECTION, POWDER, FOR SOLUTION INTRAMUSCULAR; INTRAVENOUS at 00:38

## 2020-11-09 RX ADMIN — INSULIN LISPRO 4 UNITS: 100 INJECTION, SOLUTION INTRAVENOUS; SUBCUTANEOUS at 22:23

## 2020-11-09 RX ADMIN — LEVOFLOXACIN 500 MG: 5 INJECTION, SOLUTION INTRAVENOUS at 18:29

## 2020-11-09 RX ADMIN — HEPARIN SODIUM 5000 UNITS: 5000 INJECTION INTRAVENOUS; SUBCUTANEOUS at 05:52

## 2020-11-09 RX ADMIN — INSULIN LISPRO 7 UNITS: 100 INJECTION, SOLUTION INTRAVENOUS; SUBCUTANEOUS at 08:37

## 2020-11-09 RX ADMIN — AMLODIPINE BESYLATE 5 MG: 5 TABLET ORAL at 08:38

## 2020-11-09 RX ADMIN — Medication 10 ML: at 22:00

## 2020-11-09 RX ADMIN — Medication 400 MG: at 08:37

## 2020-11-09 RX ADMIN — INSULIN GLARGINE 10 UNITS: 100 INJECTION, SOLUTION SUBCUTANEOUS at 14:27

## 2020-11-09 RX ADMIN — INSULIN LISPRO 7 UNITS: 100 INJECTION, SOLUTION INTRAVENOUS; SUBCUTANEOUS at 17:15

## 2020-11-09 RX ADMIN — METHYLPREDNISOLONE SODIUM SUCCINATE 125 MG: 125 INJECTION, POWDER, FOR SOLUTION INTRAMUSCULAR; INTRAVENOUS at 05:51

## 2020-11-09 RX ADMIN — HEPARIN SODIUM 5000 UNITS: 5000 INJECTION INTRAVENOUS; SUBCUTANEOUS at 14:47

## 2020-11-09 RX ADMIN — Medication 10 ML: at 06:00

## 2020-11-09 RX ADMIN — ROSUVASTATIN CALCIUM 10 MG: 10 TABLET, FILM COATED ORAL at 22:00

## 2020-11-09 RX ADMIN — INSULIN LISPRO 12 UNITS: 100 INJECTION, SOLUTION INTRAVENOUS; SUBCUTANEOUS at 14:46

## 2020-11-09 RX ADMIN — FAMOTIDINE 20 MG: 20 TABLET ORAL at 08:37

## 2020-11-09 RX ADMIN — LOSARTAN POTASSIUM 100 MG: 50 TABLET, FILM COATED ORAL at 08:38

## 2020-11-09 NOTE — PROGRESS NOTES
[facility] Adult  Hospitalist Group                                                                                          Hospitalist Progress Note  [address] [facility],   Answering service: [phone] from in house phone        Date of Service:  2020  NAME:  Elsworth Bernheim  :  1950  MRN:  [id number]      Admission Summary: This is a 30-year-old man with a past medical history significant for type 2 diabetes, hypertension, dyslipidemia, was in his usual state of health until the day of his presentation at the emergency room when the patient developed a change in mental status. The patient reported to his dentist's office and underwent a number of tooth extractions. The procedure finishing at about 10:00 a.m. The patient developed a change in mental status after the procedure. He was monitored at the dentist's office without any improvement in his mental status. The patient was at baseline before the procedure according to report. The patient was also found to be hypoxic with oxygen saturation of 88%. The oxygen saturation improved with supplemental oxygen. Because of the persistent change in mental status, the patient was sent to the emergency room at the [facility] in [place]. The patient has no history of congestive heart failure or respiratory disease. There was no sick contact or contact with any person with COVID-19 virus infection. When the patient arrived at the emergency room, the patient was found to have a fever of 101.9, significant leukocytosis, and elevated lactic acid level. Lumbar puncture was performed. The patient received antibiotics and was referred to the hospitalist service for evaluation for admission. CT scan of the head done on arrival at the emergency room did not show any acute intracranial abnormalities. No record of prior admission to this hospital.       Interval history / Subjective:    Follow up fevers. Patient seen and examined. Much more alert and conversational today. Joint swelling improved and able to participate in exam.   Discussed with Dr. Emma Ku and recommends transition to oral prednisone 11/10 with close outpatient follow up. Assessment & Plan:     Inflammatory disease secondary to infectious process vs primary disease: improving  -has multiple joints involved including bilateral ankles, left wrist  -discussed with rheumatology 11/8. Initiated on solumedrol 125 mg q 6 hours with significant improvement. Will transition to oral prednisone 11/10. Will need outpatient follow up   -Prior arthrocentesis attempted on ankle but unsuccessful  -Low suspicion for gout given low uric acid  -Left wrist x-ray negative    Sepsis: resolved  -suspected pulmonary origin with bilateral opacities on CT scan   -ID following, remains on levofloxacin   -Leukocytosis improving, fevers resolved  -has possible small dental abscess but less likely source for systemic infection  -COVID 19 test negative    Left upper extremity swelling:  -Superficial thrombus, no anticoagulation indicated     Metabolic encephalopathy: resolved  -Secondary to above    Acute respiratory failure with hypoxia: resolved    Type 2 diabetes with hyperglycemia:  -HA1C 7.5  -Continue SSI, monitor for hypoglycemia with steroids    Hypertension: Stable, cont amlodipine, cozaar; monitor BP    Dyslipidemia: cont preadmission medication    Hypokalemia: replete and repeat lab in AM     Code status: Full  DVT prophylaxis: scd    Care Plan discussed with: Patient/Family and Nurse  Anticipated Disposition: Home w/Family  Anticipated Discharge: 24 hours to 48 hours   Outside hospital unable to accept patient.  Will discuss dispo with patient's son        Hospital Problems  Date Reviewed: 10/31/2020          Codes Class Noted POA    Encephalopathy ICD-10-CM: G93.40  ICD-9-CM: 348.30  11/1/2020 Unknown        * (Principal) Acute delirium ICD-10-CM: R41.0  ICD-9-CM: 780.09  10/30/2020 Yes                Review of Systems:   A comprehensive review of systems was negative. Xr Chest Sngl V    Result Date: 10/30/2020  IMPRESSION: Mild central vascular fullness and interstitial prominence. Poor inspiratory effort. No focal infiltrate. .  . Xr Wrist Lt Ap/lat    Result Date: 11/7/2020  IMPRESSION: No acute abnormality. Xr Ankle Lt Ap/lat    Result Date: 11/4/2020  IMPRESSION: 1. Soft tissue swelling with ankle effusion. Degenerative changes of the midfoot and chronic loose bodies distal to the medial malleolus. Xr Ankle Rt Ap/lat    Result Date: 11/4/2020  IMPRESSION: 1. Possible tibiotalar joint effusion. 2. Mild diffuse swelling of the right foot/ankle. 3. Mild chronic degenerative change. 4. No acute osseous abnormality demonstrated. Ct Head Wo Cont    Result Date: 10/30/2020  IMPRESSION: 1. No acute intracranial abnormality. 2. Microvascular ischemic, possible old ischemic and age-related change greater than expected for stated age. 3. Mild sinusitis. Ct Maxillofacial Wo Cont    Result Date: 11/1/2020  IMPRESSION: No fracture. Possible small right mandibular periapical dental abscesses. Cta Chest W Or W Wo Cont    Result Date: 11/1/2020  IMPRESSION: 1. No pulmonary emboli. 2. Groundglass airspace disease, nonspecific. Ct Abd Pelv W Cont    Result Date: 11/1/2020  IMPRESSION: No Acute Disease. Vital Signs:    Last 24hrs VS reviewed since prior progress note. Most recent are:  Visit Vitals  /72 (BP 1 Location: Left arm, BP Patient Position: At rest)   Pulse 97   Temp 98.3 °F (36.8 °C)   Resp 16   Ht 6' 2\" (1.88 m)   Wt 99.1 kg (218 lb 8 oz)   SpO2 97%   BMI 28.05 kg/m²       No intake or output data in the 24 hours ending 11/09/20 1122     Physical Examination:             Constitutional:  No acute distress, cooperative, pleasant   ENT:  Oral mucosa moist, oropharynx benign. Resp:  CTA bilaterally. No wheezing/rhonchi/rales. No accessory muscle use   CV:  Regular rhythm, normal rate, no murmurs, gallops, rubs    GI:  Soft, non distended, non tender. normoactive bowel sounds, no hepatosplenomegaly     Musculoskeletal:  Warm, 2+ pulses throughout; bilateral ankles and left wrist with minimal swelling     Neurologic:  Moves all extremities     Psych:  Good insight, Not anxious nor agitated. Data Review:    I personally reviewed  Image and labs      Labs:     Recent Labs     11/09/20 0100 11/08/20  0340   WBC 22.0* 16.7*   HGB 9.8* 9.5*   HCT 29.5* 29.6*   * 382     Recent Labs     11/09/20 0100 11/08/20 0340 11/07/20  0326   * 134* 134*   K 3.9 4.2 3.3*   CL 99 98 97   CO2 26 27 26   BUN 19 12 10   CREA 1.00 0.86 0.90   * 159* 123*   CA 9.2 8.9 9.0   MG  --  2.2  --    PHOS  --  4.0  --      No results for input(s): ALT, AP, TBIL, TBILI, TP, ALB, GLOB, GGT, AML, LPSE in the last 72 hours. No lab exists for component: SGOT, GPT, AMYP, HLPSE  No results for input(s): INR, PTP, APTT, INREXT, INREXT in the last 72 hours. Recent Labs     11/08/20  1532   FERR 576*      No results found for: FOL, RBCF   No results for input(s): PH, PCO2, PO2 in the last 72 hours. No results for input(s): CPK, CKNDX, TROIQ in the last 72 hours.     No lab exists for component: CPKMB  No results found for: CHOL, CHOLX, CHLST, CHOLV, HDL, HDLP, LDL, LDLC, DLDLP, TGLX, TRIGL, TRIGP, CHHD, CHHDX  Lab Results   Component Value Date/Time    Glucose (POC) 310 (H) 11/09/2020 07:51 AM    Glucose (POC) 284 (H) 11/08/2020 04:39 PM    Glucose (POC) 137 (H) 11/08/2020 11:43 AM    Glucose (POC) 138 (H) 11/08/2020 07:53 AM    Glucose (POC) 161 (H) 11/07/2020 09:22 PM     Lab Results   Component Value Date/Time    Color YELLOW/STRAW 10/31/2020 03:16 AM    Appearance CLEAR 10/31/2020 03:16 AM    Specific gravity 1.030 10/31/2020 03:16 AM    Specific gravity PATIENT DISCHARGED BEFORE SAMPLE COLLECTED 10/30/2020 04:15 PM    pH (UA) 5.0 10/31/2020 03:16 AM    Protein Negative 10/31/2020 03:16 AM    Glucose Negative 10/31/2020 03:16 AM    Ketone Negative 10/31/2020 03:16 AM    Bilirubin Negative 10/31/2020 03:16 AM    Urobilinogen 0.2 10/31/2020 03:16 AM    Nitrites Negative 10/31/2020 03:16 AM    Leukocyte Esterase Negative 10/31/2020 03:16 AM    Epithelial cells PATIENT DISCHARGED BEFORE SAMPLE COLLECTED 10/30/2020 04:15 PM    Bacteria PATIENT DISCHARGED BEFORE SAMPLE COLLECTED 10/30/2020 04:15 PM    WBC PATIENT DISCHARGED BEFORE SAMPLE COLLECTED 10/30/2020 04:15 PM    RBC PATIENT DISCHARGED BEFORE SAMPLE COLLECTED 10/30/2020 04:15 PM         Medications Reviewed:     Current Facility-Administered Medications   Medication Dose Route Frequency    [START ON 11/10/2020] predniSONE (DELTASONE) tablet 20 mg  20 mg Oral DAILY WITH BREAKFAST    methylPREDNISolone (PF) (Solu-MEDROL) injection 125 mg  125 mg IntraVENous Q6H    famotidine (PEPCID) tablet 20 mg  20 mg Oral DAILY    [Held by provider] ibuprofen (MOTRIN) tablet 400 mg  400 mg Oral TID    heparin (porcine) injection 5,000 Units  5,000 Units SubCUTAneous Q8H    amLODIPine (NORVASC) tablet 5 mg  5 mg Oral DAILY    And    losartan (COZAAR) tablet 100 mg  100 mg Oral DAILY    levoFLOXacin (LEVAQUIN) 500 mg in D5W IVPB  500 mg IntraVENous Q24H    sodium chloride (NS) flush 5-10 mL  5-10 mL IntraVENous PRN    sodium chloride (NS) flush 5-40 mL  5-40 mL IntraVENous Q8H    sodium chloride (NS) flush 5-40 mL  5-40 mL IntraVENous PRN    acetaminophen (TYLENOL) tablet 650 mg  650 mg Oral Q6H PRN    Or    acetaminophen (TYLENOL) suppository 650 mg  650 mg Rectal Q6H PRN    polyethylene glycol (MIRALAX) packet 17 g  17 g Oral DAILY PRN    promethazine (PHENERGAN) tablet 12.5 mg  12.5 mg Oral Q6H PRN    Or    ondansetron (ZOFRAN) injection 4 mg  4 mg IntraVENous Q6H PRN    insulin lispro (HUMALOG) injection   SubCUTAneous AC&HS    glucose chewable tablet 16 g  4 Tab Oral PRN    glucagon (GLUCAGEN) injection 1 mg  1 mg IntraMUSCular PRN    rosuvastatin (CRESTOR) tablet 10 mg  10 mg Oral QHS    dextrose 10 % infusion 125-250 mL  125-250 mL IntraVENous PRN    lactated Ringers infusion  50 mL/hr IntraVENous CONTINUOUS     ______________________________________________________________________  EXPECTED LENGTH OF STAY: 4d 19h  ACTUAL LENGTH OF STAY:          2000 Mahi Rm, DO

## 2020-11-09 NOTE — PROGRESS NOTES
Spiritual Care Assessment/Progress Note  [facility]      NAME: Betsey Redding      MRN: [id number]  AGE: 78 y.o.  SEX: male  Scientologist Affiliation: Hindu   Language: English     11/9/2020     Total Time (in minutes): 9     Spiritual Assessment begun in [address] through conversation with:         [x]Patient        [] Family    [] Friend(s)        Reason for Consult: Initial/Spiritual assessment, patient floor     Spiritual beliefs: (Please include comment if needed)     [x] Identifies with a faisal tradition:         [] Supported by a faisal community:            [] Claims no spiritual orientation:           [] Seeking spiritual identity:                [] Adheres to an individual form of spirituality:           [] Not able to assess:                           Identified resources for coping:      [] Prayer                               [] Music                  [] Guided Imagery     [] Family/friends                 [] Pet visits     [] Devotional reading                         [x] Unknown     [] Other                                             Interventions offered during this visit: (See comments for more details)    Patient Interventions: Affirmation of emotions/emotional suffering, Affirmation of faisal, Iconic (affirming the presence of God/Higher Power), Initial/Spiritual assessment, patient floor, Prayer (assurance of)           Plan of Care:     [] Support spiritual and/or cultural needs    [] Support AMD and/or advance care planning process      [] Support grieving process   [] Coordinate Rites and/or Rituals    [] Coordination with community clergy   [] No spiritual needs identified at this time   [] Detailed Plan of Care below (See Comments)  [] Make referral to Music Therapy  [] Make referral to Pet Therapy     [] Make referral to Addiction services  [] Make referral to [facility]  [] Make referral to Spiritual Care Partner  [] No future visits requested        [x] Follow up visits as needed     Comments: Visited with Mr. Dontrell Coronado for routine initial spiritual assessment. Pt provided an update on how he is feeling, acknowledging that he has been through a difficult time, but feels that things are improving. Pt shared of his faisal in God which is a source of strength and comfort. Assured him of prayers. No additional needs expressed by pt at this time.     Nicole Pablo, Jackie Moser

## 2020-11-09 NOTE — PROGRESS NOTES
Problem: Risk for Spread of Infection  Goal: Prevent transmission of infectious organism to others  Description: Prevent the transmission of infectious organisms to other patients, staff members, and visitors. Outcome: Progressing Towards Goal     Problem: Patient Education:  Go to Education Activity  Goal: Patient/Family Education  Outcome: Progressing Towards Goal     Problem: Falls - Risk of  Goal: *Absence of Falls  Description: Document Vinicio Hess Fall Risk and appropriate interventions in the flowsheet. Outcome: Progressing Towards Goal  Note: Fall Risk Interventions:  Mobility Interventions: Bed/chair exit alarm, Communicate number of staff needed for ambulation/transfer, OT consult for ADLs, Patient to call before getting OOB, PT Consult for mobility concerns, PT Consult for assist device competence, Strengthening exercises (ROM-active/passive)    Mentation Interventions: Adequate sleep, hydration, pain control, Bed/chair exit alarm, Door open when patient unattended, Evaluate medications/consider consulting pharmacy, Increase mobility, More frequent rounding, Toileting rounds, Update white board, Reorient patient    Medication Interventions: Bed/chair exit alarm, Evaluate medications/consider consulting pharmacy, Patient to call before getting OOB, Teach patient to arise slowly    Elimination Interventions: Bed/chair exit alarm, Call light in reach, Patient to call for help with toileting needs, Stay With Me (per policy), Toilet paper/wipes in reach, Toileting schedule/hourly rounds, Urinal in reach              Problem: Patient Education: Go to Patient Education Activity  Goal: Patient/Family Education  Outcome: Progressing Towards Goal     Problem: Pressure Injury - Risk of  Goal: *Prevention of pressure injury  Description: Document Sulaiman Scale and appropriate interventions in the flowsheet.   Outcome: Progressing Towards Goal  Note: Pressure Injury Interventions:  Sensory Interventions: Assess changes in LOC, Avoid rigorous massage over bony prominences, Check visual cues for pain, Discuss PT/OT consult with provider, Float heels, Keep linens dry and wrinkle-free, Maintain/enhance activity level, Minimize linen layers, Monitor skin under medical devices, Pressure redistribution bed/mattress (bed type), Turn and reposition approx. every two hours (pillows and wedges if needed)    Moisture Interventions: Absorbent underpads, Apply protective barrier, creams and emollients, Internal/External urinary devices, Limit adult briefs, Maintain skin hydration (lotion/cream), Minimize layers, Moisture barrier    Activity Interventions: Increase time out of bed, PT/OT evaluation, Pressure redistribution bed/mattress(bed type)    Mobility Interventions: HOB 30 degrees or less, Pressure redistribution bed/mattress (bed type), PT/OT evaluation, Turn and reposition approx. every two hours(pillow and wedges)    Nutrition Interventions: Document food/fluid/supplement intake, Offer support with meals,snacks and hydration    Friction and Shear Interventions: Apply protective barrier, creams and emollients, HOB 30 degrees or less, Lift sheet                Problem: Patient Education: Go to Patient Education Activity  Goal: Patient/Family Education  Outcome: Progressing Towards Goal     Problem: Breathing Pattern - Ineffective  Goal: *Absence of hypoxia  Outcome: Progressing Towards Goal  Goal: *Use of effective breathing techniques  Outcome: Progressing Towards Goal  Goal: *PALLIATIVE CARE:  Alleviation of Dyspnea  Outcome: Progressing Towards Goal     Problem: Patient Education: Go to Patient Education Activity  Goal: Patient/Family Education  Outcome: Progressing Towards Goal     Problem: Diabetes Self-Management  Goal: *Disease process and treatment process  Description: Define diabetes and identify own type of diabetes; list 3 options for treating diabetes.   Outcome: Progressing Towards Goal  Goal: *Incorporating nutritional management into lifestyle  Description: Describe effect of type, amount and timing of food on blood glucose; list 3 methods for planning meals. Outcome: Progressing Towards Goal  Goal: *Incorporating physical activity into lifestyle  Description: State effect of exercise on blood glucose levels. Outcome: Progressing Towards Goal  Goal: *Developing strategies to promote health/change behavior  Description: Define the ABC's of diabetes; identify appropriate screenings, schedule and personal plan for screenings. Outcome: Progressing Towards Goal  Goal: *Using medications safely  Description: State effect of diabetes medications on diabetes; name diabetes medication taking, action and side effects. Outcome: Progressing Towards Goal  Goal: *Monitoring blood glucose, interpreting and using results  Description: Identify recommended blood glucose targets  and personal targets. Outcome: Progressing Towards Goal  Goal: *Prevention, detection, treatment of acute complications  Description: List symptoms of hyper- and hypoglycemia; describe how to treat low blood sugar and actions for lowering  high blood glucose level. Outcome: Progressing Towards Goal  Goal: *Prevention, detection and treatment of chronic complications  Description: Define the natural course of diabetes and describe the relationship of blood glucose levels to long term complications of diabetes.   Outcome: Progressing Towards Goal  Goal: *Developing strategies to address psychosocial issues  Description: Describe feelings about living with diabetes; identify support needed and support network  Outcome: Progressing Towards Goal  Goal: *Insulin pump training  Outcome: Progressing Towards Goal  Goal: *Sick day guidelines  Outcome: Progressing Towards Goal  Goal: *Patient Specific Goal (EDIT GOAL, INSERT TEXT)  Outcome: Progressing Towards Goal     Problem: Patient Education: Go to Patient Education Activity  Goal: Patient/Family Education  Outcome: Progressing Towards Goal     Problem: Discharge Planning  Goal: *Discharge to safe environment  Outcome: Progressing Towards Goal     Problem: Sepsis: Day 3  Goal: *Central Venous Pressure maintained at 8-12 mm Hg  Outcome: Progressing Towards Goal  Goal: *Oxygen saturation within defined limits  Outcome: Progressing Towards Goal  Goal: *Vital sign stability  Outcome: Progressing Towards Goal  Goal: *Tolerating diet  Outcome: Progressing Towards Goal  Goal: *Demonstrates progressive activity  Outcome: Progressing Towards Goal  Goal: Activity/Safety  Outcome: Progressing Towards Goal  Goal: Consults, if ordered  Outcome: Progressing Towards Goal  Goal: Diagnostic Test/Procedures  Outcome: Progressing Towards Goal  Goal: Nutrition/Diet  Outcome: Progressing Towards Goal  Goal: Discharge Planning  Outcome: Progressing Towards Goal  Goal: Medications  Outcome: Progressing Towards Goal  Goal: Respiratory  Outcome: Progressing Towards Goal  Goal: Treatments/Interventions/Procedures  Outcome: Progressing Towards Goal  Goal: Psychosocial  Outcome: Progressing Towards Goal     Problem:  Body Temperature -  Risk of, Imbalanced  Goal: *Absence of heat stress or hyperthermia signs and symptoms  Outcome: Progressing Towards Goal  Goal: *Absence of cold stress or hypothermia signs and symptoms  Outcome: Progressing Towards Goal     Problem: Patient Education: Go to Patient Education Activity  Goal: Patient/Family Education  Outcome: Progressing Towards Goal     Problem: Delirium Treatment  Goal: *Level of consciousness restored to baseline  Outcome: Progressing Towards Goal  Goal: *Level of environmental perceptions restored to baseline  Outcome: Progressing Towards Goal  Goal: *Sensory perception restored to baseline  Outcome: Progressing Towards Goal  Goal: *Emotional stability restored to baseline  Outcome: Progressing Towards Goal  Goal: *Functional assessment restored to baseline  Outcome: Progressing Towards Goal  Goal: *Absence of falls  Outcome: Progressing Towards Goal  Goal: *Will remain free of delirium, CAM Score negative  Outcome: Progressing Towards Goal  Goal: *Cognitive status will be restored to baseline  Outcome: Progressing Towards Goal  Goal: Interventions  Outcome: Progressing Towards Goal     Problem: Patient Education: Go to Patient Education Activity  Goal: Patient/Family Education  Outcome: Progressing Towards Goal     Problem: Patient Education: Go to Patient Education Activity  Goal: Patient/Family Education  Outcome: Progressing Towards Goal     Problem: Patient Education: Go to Patient Education Activity  Goal: Patient/Family Education  Outcome: Progressing Towards Goal     Problem: General Medical Care Plan  Goal: *Vital signs within specified parameters  Outcome: Progressing Towards Goal  Goal: *Labs within defined limits  Outcome: Progressing Towards Goal  Goal: *Absence of infection signs and symptoms  Outcome: Progressing Towards Goal  Goal: *Optimal pain control at patient's stated goal  Outcome: Progressing Towards Goal  Goal: *Skin integrity maintained  Outcome: Progressing Towards Goal  Goal: *Fluid volume balance  Outcome: Progressing Towards Goal  Goal: *Optimize nutritional status  Outcome: Progressing Towards Goal  Goal: *Anxiety reduced or absent  Outcome: Progressing Towards Goal  Goal: *Progressive mobility and function (eg: ADL's)  Outcome: Progressing Towards Goal     Problem: Patient Education: Go to Patient Education Activity  Goal: Patient/Family Education  Outcome: Progressing Towards Goal

## 2020-11-09 NOTE — CONSULTS
Comprehensive Nutrition Assessment      Comprehensive Nutrition Assessment    Type and Reason for Visit: Initial, Consult, RD nutrition re-screen/LOS    Nutrition Recommendations/Plan:   1. Adjusted diet to reduce CHO to help with elevated BG  2. Continue Glucerna due to poor PO/poor appetite for added kcal/protein  3. Check BG, weight, steroid taper    4. Zero bed and reweigh pt. Nutrition Assessment:       Pt admitted for Acute delirium [R41.0]  Encephalopathy [G93.40]. Pt  has a past medical history of Diabetes ([facility] [address] Pt with multiple providers on multiple visits. Unsure pt usual BW. Per chart, pt was 122 kg on admission. Chart weight indicates currently 99.1 kg, which is unlikely. Wt change over 1 day of 25 kg is likely bed scale or some other error. Please reweigh pt. Pt with reportedly low PO. Glucerna added previously on 11/6. Continue ONS while PO is poor for kcal/protein and reducing CHO for elevated BG. Pt was on Dental soft for ease of chewing. Adjusted diet to Consistent Carb 2000 with Soft Solids. Pt on steroids - just started transition to oral steroids. Monitor BG. A1c 7.5. BG POC have been 351, 310, 284, 137, 138 mg/dL over last 2 days. No wt hx in records. Pt appears to be more alert today, monitor PO intakes now with improved mentation and reweigh as able. Patient Vitals for the past 168 hrs:   % Diet Eaten   11/06/20 1325 25 %   11/03/20 1912 0 %   11/03/20 1200 15 %   11/03/20 0800 0 %   11/02/20 1600 50 %       Malnutrition Assessment:  Malnutrition Status:  Insufficient data    Context:            Estimated Daily Nutrient Needs:  Energy (kcal):  2380  Protein (g):  100       Fluid (ml/day):  1 mL /kcal    Nutrition Related Findings:       Last BM 11/8, 2+ BLE.        Wounds:    None       Current Nutrition Therapies:  DIET ONE TIME MESSAGE  DIET NUTRITIONAL SUPPLEMENTS Dinner, Breakfast; Glucerna Shake  DIET DIABETIC WITH OPTIONS Consistent Carb 2000kcal; Soft Solids    Anthropometric Measures:  · Height:  6' 2.02\" (188 cm)  · Current Body Wt:  99.1 kg (218 lb 7.6 oz)   · Admission Body Wt:  269 lb 13.5 oz    · Usual Body Wt:      ?  · Ideal Body Wt:  190 lbs:  115 %   · BMI Category: Overweight (BMI 25.0-29. 9)       Nutrition Diagnosis:   · Inadequate protein-energy intake related to inadequate protein-energy intake as evidenced by weight loss(poor appetite, lower than normal PO)      Nutrition Interventions:   Food and/or Nutrient Delivery: Modify current diet, Continue oral nutrition supplement  Nutrition Education and Counseling: No recommendations at this time  Coordination of Nutrition Care: Continue to monitor while inpatient, Interdisciplinary rounds    Goals:  Pt will consume >50% of meals and 1-2 ONS within 4-6 days with BG < 180 mg/dL       Nutrition Monitoring and Evaluation:   Behavioral-Environmental Outcomes: None identified  Food/Nutrient Intake Outcomes: Food and nutrient intake, Supplement intake  Physical Signs/Symptoms Outcomes: Biochemical data, Weight    Discharge Planning:    Continue oral nutrition supplement, Continue current diet     Electronically signed by Cherri Bean RD on 11/9/2020    Contact: [phone]

## 2020-11-09 NOTE — PROGRESS NOTES
Problem: Risk for Spread of Infection  Goal: Prevent transmission of infectious organism to others  Description: Prevent the transmission of infectious organisms to other patients, staff members, and visitors. Outcome: Progressing Towards Goal     Problem: Falls - Risk of  Goal: *Absence of Falls  Description: Document Roche Lily Fall Risk and appropriate interventions in the flowsheet. Outcome: Progressing Towards Goal  Note: Fall Risk Interventions:  Mobility Interventions: Bed/chair exit alarm, Communicate number of staff needed for ambulation/transfer, OT consult for ADLs, Patient to call before getting OOB, PT Consult for mobility concerns, PT Consult for assist device competence, Strengthening exercises (ROM-active/passive)    Mentation Interventions: Adequate sleep, hydration, pain control, Bed/chair exit alarm, Door open when patient unattended, Evaluate medications/consider consulting pharmacy, Increase mobility, More frequent rounding, Toileting rounds, Update white board, Reorient patient    Medication Interventions: Bed/chair exit alarm, Evaluate medications/consider consulting pharmacy, Patient to call before getting OOB, Teach patient to arise slowly    Elimination Interventions: Bed/chair exit alarm, Call light in reach, Patient to call for help with toileting needs, Stay With Me (per policy), Toilet paper/wipes in reach, Toileting schedule/hourly rounds, Urinal in reach              Problem: Pressure Injury - Risk of  Goal: *Prevention of pressure injury  Description: Document Sulaiman Scale and appropriate interventions in the flowsheet.   Outcome: Progressing Towards Goal  Note: Pressure Injury Interventions:  Sensory Interventions: Assess changes in LOC, Avoid rigorous massage over bony prominences, Check visual cues for pain, Discuss PT/OT consult with provider, Float heels, Keep linens dry and wrinkle-free, Maintain/enhance activity level, Minimize linen layers, Monitor skin under medical devices, Pressure redistribution bed/mattress (bed type), Turn and reposition approx. every two hours (pillows and wedges if needed)    Moisture Interventions: Absorbent underpads, Apply protective barrier, creams and emollients, Internal/External urinary devices, Limit adult briefs, Maintain skin hydration (lotion/cream), Minimize layers, Moisture barrier    Activity Interventions: Increase time out of bed, PT/OT evaluation, Pressure redistribution bed/mattress(bed type)    Mobility Interventions: HOB 30 degrees or less, Pressure redistribution bed/mattress (bed type), PT/OT evaluation, Turn and reposition approx. every two hours(pillow and wedges)    Nutrition Interventions: Document food/fluid/supplement intake, Offer support with meals,snacks and hydration    Friction and Shear Interventions: Apply protective barrier, creams and emollients, HOB 30 degrees or less, Lift sheet                Problem: Diabetes Self-Management  Goal: *Using medications safely  Description: State effect of diabetes medications on diabetes; name diabetes medication taking, action and side effects.   Outcome: Progressing Towards Goal     Problem: Sepsis: Day 2  Goal: *Tolerating diet  Outcome: Progressing Towards Goal     Problem: Delirium Treatment  Goal: *Absence of falls  Outcome: Progressing Towards Goal

## 2020-11-09 NOTE — PROGRESS NOTES
Bedside and Verbal shift change report given to Leah RN  (oncoming nurse) by Jeana Correa RN (offgoing nurse). Report included the following information SBAR, Kardex, ED Summary, Procedure Summary, Intake/Output, MAR, Recent Results, Cardiac Rhythm NSR, Quality Measures and Dual Neuro Assessment.

## 2020-11-09 NOTE — PROGRESS NOTES
TRANSITION OF CARE PLAN   RUR 12% MODERATE RISK    DISPOSITION--Return home with family assistance tomorrow (11/10)  Edilberto Chavez [phone]    Chart reviewed. Patient no longer transferring to [facility] upon discharge (no bed availability). Patient stable to discharge home with family assistance. Expected to discharge home tomorrow 11/10. Call placed to patient's spouse, Jewel Councilman [phone] to discharge plan. Patient's spouse confirmed that she paid [employer] deposit in the amount of $881.42 for transportation.  provided her with information and phone number for [facility] [phone]. Instructed her to call and request a full refund. Family agreeable to transport via private car around 12:00p tomorrow. No further case management identified.         Merlinda Fu, MSW,CRM

## 2020-11-10 VITALS
WEIGHT: 226.85 LBS | HEIGHT: 74 IN | TEMPERATURE: 97.6 F | OXYGEN SATURATION: 93 % | HEART RATE: 74 BPM | RESPIRATION RATE: 19 BRPM | SYSTOLIC BLOOD PRESSURE: 125 MMHG | BODY MASS INDEX: 29.11 KG/M2 | DIASTOLIC BLOOD PRESSURE: 68 MMHG

## 2020-11-10 LAB
GLUCOSE BLD STRIP.AUTO-MCNC: 234 MG/DL (ref 65–100)
GLUCOSE BLD STRIP.AUTO-MCNC: 296 MG/DL (ref 65–100)
L PNEUMO IGG TITR SER IF: ABNORMAL {TITER}
SERVICE CMNT-IMP: ABNORMAL
SERVICE CMNT-IMP: ABNORMAL

## 2020-11-10 PROCEDURE — 97530 THERAPEUTIC ACTIVITIES: CPT

## 2020-11-10 PROCEDURE — 74011250637 HC RX REV CODE- 250/637: Performed by: INTERNAL MEDICINE

## 2020-11-10 PROCEDURE — 74011250637 HC RX REV CODE- 250/637: Performed by: HOSPITALIST

## 2020-11-10 PROCEDURE — 82962 GLUCOSE BLOOD TEST: CPT

## 2020-11-10 PROCEDURE — 74011636637 HC RX REV CODE- 636/637: Performed by: INTERNAL MEDICINE

## 2020-11-10 PROCEDURE — 74011250636 HC RX REV CODE- 250/636: Performed by: NURSE PRACTITIONER

## 2020-11-10 PROCEDURE — 97535 SELF CARE MNGMENT TRAINING: CPT

## 2020-11-10 RX ORDER — PREDNISONE 20 MG/1
20 TABLET ORAL
Qty: 14 TAB | Refills: 0 | Status: SHIPPED | OUTPATIENT
Start: 2020-11-11

## 2020-11-10 RX ORDER — FAMOTIDINE 20 MG/1
20 TABLET, FILM COATED ORAL DAILY
Qty: 14 TAB | Refills: 0 | Status: SHIPPED | OUTPATIENT
Start: 2020-11-11

## 2020-11-10 RX ORDER — LEVOFLOXACIN 500 MG/1
500 TABLET, FILM COATED ORAL DAILY
Qty: 3 TAB | Refills: 0 | Status: SHIPPED | OUTPATIENT
Start: 2020-11-10 | End: 2020-11-13

## 2020-11-10 RX ADMIN — Medication 10 ML: at 06:00

## 2020-11-10 RX ADMIN — LOSARTAN POTASSIUM 100 MG: 50 TABLET, FILM COATED ORAL at 08:05

## 2020-11-10 RX ADMIN — INSULIN LISPRO 3 UNITS: 100 INJECTION, SOLUTION INTRAVENOUS; SUBCUTANEOUS at 08:05

## 2020-11-10 RX ADMIN — PREDNISONE 20 MG: 20 TABLET ORAL at 08:05

## 2020-11-10 RX ADMIN — INSULIN LISPRO 5 UNITS: 100 INJECTION, SOLUTION INTRAVENOUS; SUBCUTANEOUS at 12:25

## 2020-11-10 RX ADMIN — AMLODIPINE BESYLATE 5 MG: 5 TABLET ORAL at 08:05

## 2020-11-10 RX ADMIN — FAMOTIDINE 20 MG: 20 TABLET ORAL at 08:05

## 2020-11-10 RX ADMIN — HEPARIN SODIUM 5000 UNITS: 5000 INJECTION INTRAVENOUS; SUBCUTANEOUS at 05:49

## 2020-11-10 NOTE — PROGRESS NOTES
Problem: Mobility Impaired (Adult and Pediatric)  Goal: *Acute Goals and Plan of Care (Insert Text)  Description: FUNCTIONAL STATUS PRIOR TO ADMISSION: Patient was modified independent using a single point cane for functional mobility. HOME SUPPORT PRIOR TO ADMISSION: The patient lived with wife, but he did not require assist.    Physical Therapy Goals  Initiated 11/4/2020  1. Patient will move from supine to sit and sit to supine  and roll side to side in bed with modified independence within 7 day(s). 2.  Patient will transfer from bed to chair and chair to bed with modified independence using the least restrictive device within 7 day(s). 3.  Patient will perform sit to stand with supervision/set-up within 7 day(s). 4.  Patient will ambulate with supervision/set-up for 100 feet with the least restrictive device within 7 day(s). 5.  Patient will ascend/descend 4 stairs with 1 handrail(s) with supervision/set-up within 7 day(s). Outcome: Progressing Towards Goal   PHYSICAL THERAPY TREATMENT  Patient: Shane Melo (63 y.o. male)  Date: 11/10/2020  Diagnosis: Acute delirium [R41.0]  Encephalopathy [G93.40]   Acute delirium       Precautions: Fall  Chart, physical therapy assessment, plan of care and goals were reviewed. ASSESSMENT  Patient continues with skilled PT services and is progressing towards goals. He reports decreased bilateral LE pain. Patient demonstrates improved bed mobility with HOB elevated and use of bed rails. Provided increased time he is able to square himself EOB and begin to don shoes. He reports increased pain with pushing his shoes on and is assisted in donning hospital socks instead. Bed height is elevated and patient is provided verbal and tactile cues for safe sit<>stand with use of RW assist of 2. He locks the R LE in extension on standing with his weight shifted to the L. Patient is able to stand briefly before requesting to sit down.  On second stand patient is able to stand pivot to the R  and come to sit in bedside chair. He is fatigued post mobility. Family arrives at conclusion of session for education. Therapy recommends assistance of 2 for all functional mobility which family member reports they are able to provide. Current Level of Function Impacting Discharge (mobility/balance): Mod-Max Ax2    Other factors to consider for discharge: medical stability, inability to ambulate, need for assistance of 2 for short distance transfers, PLOF          PLAN :  Patient continues to benefit from skilled intervention to address the above impairments. Continue treatment per established plan of care. to address goals. Recommendation for discharge: (in order for the patient to meet his/her long term goals)  Therapy up to 5 days/week in SNF setting v. Intensive HH PT    This discharge recommendation:  Has not yet been discussed the attending provider and/or case management    IF patient discharges home will need the following DME: bedside commode, patient owns WC and RW for mobility with assistance        SUBJECTIVE:   Patient stated I don't think I want to get up again.     OBJECTIVE DATA SUMMARY:   Critical Behavior:  Neurologic State: Alert  Orientation Level: Oriented X4(periods of confusion)  Cognition: Appropriate for age attention/concentration, Follows commands  Safety/Judgement: Awareness of environment, Decreased awareness of need for safety, Fall prevention  Functional Mobility Training:  Bed Mobility:     Supine to Sit: Contact guard assistance; Additional time  Sit to Supine: (in chair)  Scooting: Contact guard assistance; Adaptive equipment(up to mod cues for cognition and positioning)        Transfers:  Sit to Stand:  Moderate assistance;Maximum assistance;Assist x2;Adaptive equipment(initial Mod A x2, increasing to Max A x2 with cont reps)  Stand to Sit: Moderate assistance;Assist x2        Bed to Chair: Moderate assistance;Assist x2;Adaptive equipment(RW) Balance:  Sitting: Impaired; Without support  Sitting - Static: Fair (occasional)  Sitting - Dynamic: Fair (occasional)  Standing: Impaired; With support(RW)  Standing - Static: Fair;Poor;Constant support  Standing - Dynamic : Poor;Constant support  Ambulation/Gait Training:                                                        Stairs: Therapeutic Exercises:     Pain Rating:      Activity Tolerance:   Fair    After treatment patient left in no apparent distress:   Sitting in chair, Call bell within reach, Bed / chair alarm activated, and Caregiver / family present    COMMUNICATION/COLLABORATION:   The patients plan of care was discussed with: Occupational therapist and Registered nurse.      Zulema Carrera, PT   Time Calculation: 26 mins

## 2020-11-10 NOTE — PROGRESS NOTES
Bedside and Verbal shift change report given to [address] (oncoming nurse) by Tuyet Hills RN (offgoing nurse). Report included the following information SBAR, Kardex, ED Summary, Procedure Summary, Intake/Output, MAR, Recent Results, Cardiac Rhythm NSR, Quality Measures and Dual Neuro Assessment.

## 2020-11-10 NOTE — DISCHARGE SUMMARY
Discharge Summary       PATIENT ID: Belen Santillan  MRN: [id number]   YOB: 1950    DATE OF ADMISSION: 10/30/2020  5:02 PM    DATE OF DISCHARGE: 11/10/2020  PRIMARY CARE PROVIDER: Vincenzo Shah MD     ATTENDING PHYSICIAN: Chau Álvarez DO   DISCHARGING PROVIDER: Chau Álvarez DO    To contact this individual call [phone] and ask the  to page. If unavailable ask to be transferred the Adult Hospitalist Department. CONSULTATIONS: IP CONSULT TO INFECTIOUS DISEASES  IP CONSULT TO ORTHOPEDIC SURGERY    PROCEDURES/SURGERIES: * No surgery found *    ADMITTING DIAGNOSES & HOSPITAL COURSE:     72-year-old male with PMH type 2 diabetes, HTN, HLD, presenting to [facility] with acute change in status. 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 The procedure finished approximately 10 AM.  Postprocedure, patient had a acute change in mental status. He presented to the nearest ED and found to be hypoxic 88% and febrile 101.9 with leukocytosis and elevated lactic acid. Lumbar puncture completed. He was transferred to [facility].  Patient underwent extensive work-up for sepsis. He remained on broad-spectrum antibiotic therapy. CT chest demonstrated bilateral opacities, consistent with viral process. COVID tested and negative x 2. Patient remained persistently febrile with fevers appearing to be cyclical with recurrent morning and afternoon fevers. He started to develop severe swelling/pain in bilateral ankles, left wrist.  Orthopedic surgery consulted and attempted arthrocentesis of ankle joint but unsuccessful. Lab significant for elevated CRP, sed rate, ferritin. WILLIAM, anti-CCP negative. Discussed with rheumatology and concerning for either inflammatory induced process secondary to sepsis versus new primary inflammatory disease.   He was initiated on Solu-Medrol 125 mg every 6 hours with resolution of fevers and significant improvement in swelling. Patient overall stable to discharge home. He was placed on prednisone 20 mg daily for maintenance. He will need close outpatient follow up with primary care physician and referral to rheumatology. Plan was discussed with patient, his son, and PCP Dr. David Decker in [place], [place].     CTA chest 11/1/2020:  IMPRESSION:   1. No pulmonary emboli. 2. Groundglass airspace disease, nonspecific.     CT maxillofacial with contrast:  IMPRESSION:   No fracture. Possible small right mandibular periapical dental abscesses. CT head:  IMPRESSION:   1. No acute intracranial abnormality. 2. Microvascular ischemic, possible old ischemic and age-related change greater  than expected for stated age. 3. Mild sinusitis. CT abdomen pelvis with contrast:  IMPRESSION: No Acute Disease. DISCHARGE DIAGNOSES / PLAN:      Inflammatory disease secondary to infectious process vs primary disease: improving  -has multiple joints involved including bilateral ankles, left wrist  -discussed with rheumatology 11/8. Initiated on solumedrol 125 mg q 6 hours with significant improvement. Will transition to oral prednisone 11/10.  Will need outpatient follow up   -Prior arthrocentesis attempted on ankle but unsuccessful  -Low suspicion for gout given low uric acid  -Left wrist x-ray negative     Sepsis: resolved  -suspected pulmonary origin with bilateral opacities on CT scan   -ID following, remains on levofloxacin   --Mycoplasma IgG elevated but negative IgM and viral PCR  -has possible small dental abscess but less likely source for systemic infection  -COVID 19 test negative      Metabolic encephalopathy: resolved  -Secondary to above     Acute respiratory failure with hypoxia: resolved     Type 2 diabetes with hyperglycemia:  -HA1C 7.5   -home medications on discharge, may need increase regimen with steroids     Hypertension: resume home medications      Dyslipidemia: cont preadmission medication     Hypokalemia: repleted         ADDITIONAL CARE RECOMMENDATIONS:   ADDITIONAL CARE RECOMMENDATIONS:      Please take prednisone 20 mg once daily in the morning, starting tomorrow 11/11/2020. Please take with famotidine. Please take levaquin once daily, starting this evening x 3 days. Please follow up with your primary care physician. We have contacted him regarding your hospitalization. All records will be faxed to his office. PENDING TEST RESULTS:   At the time of discharge the following test results are still pending: none    FOLLOW UP APPOINTMENTS:    Follow-up Information     Follow up With Specialties Details Why Contact Info    Other, Vincenzo, MD  Schedule an appointment as soon as possible for a visit f/u with PCP after discharged from hospital Patient can only remember the practice name and not the physician           DISCHARGE MEDICATIONS:  Current Discharge Medication List      START taking these medications    Details   famotidine (PEPCID) 20 mg tablet Take 1 Tab by mouth daily. Qty: 14 Tab, Refills: 0      predniSONE (DELTASONE) 20 mg tablet Take 20 mg by mouth daily (with breakfast). Qty: 14 Tab, Refills: 0      levoFLOXacin (Levaquin) 500 mg tablet Take 1 Tab by mouth daily for 3 days. Qty: 3 Tab, Refills: 0         CONTINUE these medications which have NOT CHANGED    Details   nateglinide (STARLIX) 120 mg tablet Take 120 mg by mouth Before breakfast, lunch, and dinner. amLODIPine-Olmesartan 5-40 mg tab Take 1 Tab by mouth daily. rosuvastatin (CRESTOR) 10 mg tablet Take 10 mg by mouth nightly. chlorthalidone (HYGROTON) 25 mg tablet Take 25 mg by mouth daily. NOTIFY YOUR PHYSICIAN FOR ANY OF THE FOLLOWING:   Fever over 101 degrees for 24 hours. Chest pain, shortness of breath, fever, chills, nausea, vomiting, diarrhea, change in mentation, falling, weakness, bleeding. Severe pain or pain not relieved by medications.   Or, any other signs or symptoms that you may have questions about. DISPOSITION:  x  Home With:   OT  PT  HH  RN       Long term SNF/Inpatient Rehab    Independent/assisted living    Hospice    Other:       PATIENT CONDITION AT DISCHARGE:     Functional status    Poor    x Deconditioned     Independent      Cognition   x  Lucid     Forgetful     Dementia      Catheters/lines (plus indication)    Petit     PICC     PEG    x None      Code status   x  Full code     DNR      PHYSICAL EXAMINATION AT DISCHARGE:  Constitutional:  No acute distress, cooperative, pleasant   ENT:  Oral mucosa moist, oropharynx benign. Resp:  CTA bilaterally. No wheezing/rhonchi/rales. No accessory muscle use   CV:  Regular rhythm, normal rate, no murmurs, gallops, rubs    GI:  Soft, non distended, non tender. normoactive bowel sounds, no hepatosplenomegaly     Musculoskeletal:  Warm, 2+ pulses throughout; bilateral ankles and left wrist with minimal swelling     Neurologic:  Moves all extremities                           Psych:  Good insight, Not anxious nor agitated.              CHRONIC MEDICAL DIAGNOSES:  Problem List as of 11/10/2020 Date Reviewed: 10/31/2020          Codes Class Noted - Resolved    Encephalopathy ICD-10-CM: G93.40  ICD-9-CM: 348.30  11/1/2020 - Present        * (Principal) Acute delirium ICD-10-CM: R41.0  ICD-9-CM: 780.09  10/30/2020 - Present              Greater than 30 minutes were spent with the patient on counseling and coordination of care    Signed:   Lennox Ervin DO  11/10/2020  3:08 PM

## 2020-11-10 NOTE — ROUTINE PROCESS
Unable to schedule PCP appt as there is no provider listed. [facility] is unable to be scheduled as the patient lives in [place].

## 2020-11-10 NOTE — PROGRESS NOTES
Physical Therapy Note    Patients family has not arrived from [place] yet. Will attempt PT tx on arrival in order to assist with family planning.      1136 Patient awaiting family arrival.     Thank you,  Lauri HERT

## 2020-11-10 NOTE — PROGRESS NOTES
Problem: Self Care Deficits Care Plan (Adult)  Goal: *Acute Goals and Plan of Care (Insert Text)  Description:   FUNCTIONAL STATUS PRIOR TO ADMISSION: Patient was modified independent using a single point cane for functional mobility. HOME SUPPORT: The patient lived with wife but did not require assist.    Occupational Therapy Goals  Initiated 11/4/2020  1. Patient will perform grooming in unsupported sitting position with supervision/set-up within 7 day(s). 2.  Patient will perform toilet transfers to [facility] with moderate assistance using most appropriate DME prn within 7 day(s). 3.  Patient will perform all aspects of toileting with minimal assistance using most appropriate DME prn within 7 day(s). 4.  Patient will participate in gentle upper extremity therapeutic exercise/activities with minimal assistance for 7 minutes within 7 day(s). 5.  Patient will utilize energy conservation techniques during functional activities with minimal verbal cues within 7 day(s). Outcome: Progressing Towards Goal     OCCUPATIONAL THERAPY TREATMENT  Patient: Lili Villeda (29 y.o. male)  Date: 11/10/2020  Diagnosis: Acute delirium [R41.0]  Encephalopathy [G93.40]   Acute delirium       Precautions: Fall  Chart, occupational therapy assessment, plan of care, and goals were reviewed. ASSESSMENT  Patient continues with skilled OT services and is progressing towards goals however remains limited by global debility, distal BLE pain with weight bearing, impaired activity tolerance & executive functioning, and fair-poor balance with EOB ADLs and brief transfer to the chair with RW support. He continues to require assistance of 2, good advancement to EOB with CGA & increased time, however Mod-Max A x2 for brief standing trials and sidesteps to the chair, reporting high pain levels and quick fatigue.  He would continue to benefit from d/c to rehab as he is far from his Mod I baseline, however family in room reporting he will have consistent 2 assist for ADLs and mobility, and DME needed will be obtained prior to arriving home. Therefore, recommend HHOT/PT and assist of 2 with DME below, patient and family acknowledging no other questions/concerns. Current Level of Function Impacting Discharge (ADLs): setup-Min A for upper body ADLs, Mod-Total A for lower body ADLs, and Min-Max A x1-2 for brief mobility with RW    Other factors to consider for discharge: fall risk, PMH, high PLOF, ?autoimmune response with BLE pain, supportive family         PLAN :  Patient continues to benefit from skilled intervention to address the above impairments. Continue treatment per established plan of care. to address goals. Recommend with staff: Recommend with nursing patient to complete as able in order to maintain strength, endurance and independence: ADLs with assist PRN, OOB to chair 3x/day and mobilizing to the [facility] vs bedpan pending fatigue for toileting with 2 assist. Thank you for your assistance. Recommend next OT session: Adaptive AE for lower body ADLs, continued EC education, standing tolerance with ADLs    Recommendation for discharge: (in order for the patient to meet his/her long term goals)  Would benefit from d/c to rehab, however family has elected to bring patient home with HHOT/PT and DME    This discharge recommendation:  Has been made in collaboration with the attending provider and/or case management    IF patient discharges home will need the following DME: Bedside commode (has a w/c and RW), family reporting they will obtain on the way home       SUBJECTIVE:   Patient stated Well there's still pain but its much better. I can't exactly say when it did start feeling better.     OBJECTIVE DATA SUMMARY:   Cognitive/Behavioral Status:  Neurologic State: Alert  Orientation Level: Oriented X4(periods of confusion)  Cognition: Appropriate for age attention/concentration; Follows commands  Perception: Appears intact  Perseveration: No perseveration noted  Safety/Judgement: Awareness of environment;Decreased awareness of need for safety; Fall prevention    Functional Mobility and Transfers for ADLs:  Bed Mobility:  Supine to Sit: Contact guard assistance; Additional time  Sit to Supine: (in chair)  Scooting: Contact guard assistance; Adaptive equipment(up to mod cues for cognition and positioning)    Transfers:  Sit to Stand: Moderate assistance;Maximum assistance;Assist x2;Adaptive equipment(initial Mod A x2, increasing to Max A x2 with cont reps)     Bed to Chair: Moderate assistance;Assist x2;Adaptive equipment(RW)    Balance:  Sitting: Impaired; Without support  Sitting - Static: Fair (occasional)  Sitting - Dynamic: Fair (occasional)  Standing: Impaired; With support(RW)  Standing - Static: Fair;Poor;Constant support  Standing - Dynamic : Poor;Constant support    ADL Intervention:     Lower Body Dressing Assistance  Dressing Assistance: Moderate assistance  Pants With Elastic Waist: Moderate assistance(simulated, 2 A to stand to pull up)  Socks: Stand-by assistance(increased time)  Slip on Shoes with Back: Minimum assistance(attempted, able to don 75% but unable d/t pedal edema)  Leg Crossed Method Used: Yes(increased time)  Position Performed: Seated edge of bed;Standing  Cues: Physical assistance; Tactile cues provided;Verbal cues provided;Visual cues provided         Cognitive Retraining  Safety/Judgement: Awareness of environment;Decreased awareness of need for safety; Fall prevention    Therapeutic Exercises:   Sit<>stand x2 with initial Mod A x2, progressed to Max A x2 with RW support for all trials, mod cues for hand placement & RW facilitation with poor carryover, limited standing tolerance <10 seconds d/t bilateral foot pain    Pain:  BLE pain reported (10/10 when standing per patient report)    Activity Tolerance:   Fair and requires frequent rest breaks    After treatment patient left in no apparent distress: Sitting in chair, Call bell within reach, Bed / chair alarm activated, and Caregiver / family present    COMMUNICATION/COLLABORATION:   The patients plan of care was discussed with: Physical therapist and Registered nurse.      MIRZA Powers, OTR/L  Time Calculation: 26 mins

## 2020-11-10 NOTE — PROGRESS NOTES
Problem: Risk for Spread of Infection  Goal: Prevent transmission of infectious organism to others  Description: Prevent the transmission of infectious organisms to other patients, staff members, and visitors. Outcome: Progressing Towards Goal     Problem: Patient Education:  Go to Education Activity  Goal: Patient/Family Education  Outcome: Progressing Towards Goal     Problem: Falls - Risk of  Goal: *Absence of Falls  Description: Document Emma Virk Fall Risk and appropriate interventions in the flowsheet. Outcome: Progressing Towards Goal  Note: Fall Risk Interventions:  Mobility Interventions: Communicate number of staff needed for ambulation/transfer, Patient to call before getting OOB, OT consult for ADLs, PT Consult for mobility concerns, PT Consult for assist device competence, Strengthening exercises (ROM-active/passive)    Mentation Interventions: Adequate sleep, hydration, pain control, Bed/chair exit alarm, Door open when patient unattended, Evaluate medications/consider consulting pharmacy, Familiar objects from home, Increase mobility, More frequent rounding, Reorient patient, Toileting rounds, Update white board    Medication Interventions: Bed/chair exit alarm, Evaluate medications/consider consulting pharmacy, Patient to call before getting OOB, Teach patient to arise slowly    Elimination Interventions: Bed/chair exit alarm, Call light in reach, Patient to call for help with toileting needs, Toilet paper/wipes in reach, Toileting schedule/hourly rounds, Urinal in reach              Problem: Patient Education: Go to Patient Education Activity  Goal: Patient/Family Education  Outcome: Progressing Towards Goal     Problem: Pressure Injury - Risk of  Goal: *Prevention of pressure injury  Description: Document Sulaiman Scale and appropriate interventions in the flowsheet.   Outcome: Progressing Towards Goal  Note: Pressure Injury Interventions:  Sensory Interventions: Assess changes in LOC, Avoid rigorous massage over bony prominences, Check visual cues for pain, Discuss PT/OT consult with provider, Float heels, Keep linens dry and wrinkle-free, Maintain/enhance activity level, Minimize linen layers, Monitor skin under medical devices, Pressure redistribution bed/mattress (bed type), Turn and reposition approx. every two hours (pillows and wedges if needed)    Moisture Interventions: Absorbent underpads, Apply protective barrier, creams and emollients, Internal/External urinary devices, Maintain skin hydration (lotion/cream), Minimize layers, Moisture barrier    Activity Interventions: Increase time out of bed, Pressure redistribution bed/mattress(bed type), PT/OT evaluation    Mobility Interventions: HOB 30 degrees or less, Pressure redistribution bed/mattress (bed type), PT/OT evaluation, Turn and reposition approx. every two hours(pillow and wedges)    Nutrition Interventions: Document food/fluid/supplement intake, Offer support with meals,snacks and hydration    Friction and Shear Interventions: Apply protective barrier, creams and emollients, HOB 30 degrees or less, Lift sheet, Lift team/patient mobility team, Minimize layers                Problem: Patient Education: Go to Patient Education Activity  Goal: Patient/Family Education  Outcome: Progressing Towards Goal     Problem: Breathing Pattern - Ineffective  Goal: *Absence of hypoxia  Outcome: Progressing Towards Goal  Goal: *Use of effective breathing techniques  Outcome: Progressing Towards Goal  Goal: *PALLIATIVE CARE:  Alleviation of Dyspnea  Outcome: Progressing Towards Goal     Problem: Patient Education: Go to Patient Education Activity  Goal: Patient/Family Education  Outcome: Progressing Towards Goal     Problem: Diabetes Self-Management  Goal: *Disease process and treatment process  Description: Define diabetes and identify own type of diabetes; list 3 options for treating diabetes.   Outcome: Progressing Towards Goal  Goal: *Incorporating nutritional management into lifestyle  Description: Describe effect of type, amount and timing of food on blood glucose; list 3 methods for planning meals. Outcome: Progressing Towards Goal  Goal: *Incorporating physical activity into lifestyle  Description: State effect of exercise on blood glucose levels. Outcome: Progressing Towards Goal  Goal: *Developing strategies to promote health/change behavior  Description: Define the ABC's of diabetes; identify appropriate screenings, schedule and personal plan for screenings. Outcome: Progressing Towards Goal  Goal: *Using medications safely  Description: State effect of diabetes medications on diabetes; name diabetes medication taking, action and side effects. Outcome: Progressing Towards Goal  Goal: *Monitoring blood glucose, interpreting and using results  Description: Identify recommended blood glucose targets  and personal targets. Outcome: Progressing Towards Goal  Goal: *Prevention, detection, treatment of acute complications  Description: List symptoms of hyper- and hypoglycemia; describe how to treat low blood sugar and actions for lowering  high blood glucose level. Outcome: Progressing Towards Goal  Goal: *Prevention, detection and treatment of chronic complications  Description: Define the natural course of diabetes and describe the relationship of blood glucose levels to long term complications of diabetes.   Outcome: Progressing Towards Goal  Goal: *Developing strategies to address psychosocial issues  Description: Describe feelings about living with diabetes; identify support needed and support network  Outcome: Progressing Towards Goal  Goal: *Insulin pump training  Outcome: Progressing Towards Goal  Goal: *Sick day guidelines  Outcome: Progressing Towards Goal  Goal: *Patient Specific Goal (EDIT GOAL, INSERT TEXT)  Outcome: Progressing Towards Goal     Problem: Patient Education: Go to Patient Education Activity  Goal: Patient/Family Education  Outcome: Progressing Towards Goal     Problem: Discharge Planning  Goal: *Discharge to safe environment  Outcome: Progressing Towards Goal     Problem:  Body Temperature -  Risk of, Imbalanced  Goal: *Absence of heat stress or hyperthermia signs and symptoms  Outcome: Progressing Towards Goal  Goal: *Absence of cold stress or hypothermia signs and symptoms  Outcome: Progressing Towards Goal     Problem: Patient Education: Go to Patient Education Activity  Goal: Patient/Family Education  Outcome: Progressing Towards Goal     Problem: Delirium Treatment  Goal: *Level of consciousness restored to baseline  Outcome: Progressing Towards Goal  Goal: *Level of environmental perceptions restored to baseline  Outcome: Progressing Towards Goal  Goal: *Sensory perception restored to baseline  Outcome: Progressing Towards Goal  Goal: *Emotional stability restored to baseline  Outcome: Progressing Towards Goal  Goal: *Functional assessment restored to baseline  Outcome: Progressing Towards Goal  Goal: *Absence of falls  Outcome: Progressing Towards Goal  Goal: *Will remain free of delirium, CAM Score negative  Outcome: Progressing Towards Goal  Goal: *Cognitive status will be restored to baseline  Outcome: Progressing Towards Goal  Goal: Interventions  Outcome: Progressing Towards Goal     Problem: Patient Education: Go to Patient Education Activity  Goal: Patient/Family Education  Outcome: Progressing Towards Goal     Problem: Patient Education: Go to Patient Education Activity  Goal: Patient/Family Education  Outcome: Progressing Towards Goal     Problem: Patient Education: Go to Patient Education Activity  Goal: Patient/Family Education  Outcome: Progressing Towards Goal     Problem: General Medical Care Plan  Goal: *Vital signs within specified parameters  Outcome: Progressing Towards Goal  Goal: *Labs within defined limits  Outcome: Progressing Towards Goal  Goal: *Absence of infection signs and symptoms  Outcome: Progressing Towards Goal  Goal: *Optimal pain control at patient's stated goal  Outcome: Progressing Towards Goal  Goal: *Skin integrity maintained  Outcome: Progressing Towards Goal  Goal: *Fluid volume balance  Outcome: Progressing Towards Goal  Goal: *Optimize nutritional status  Outcome: Progressing Towards Goal  Goal: *Anxiety reduced or absent  Outcome: Progressing Towards Goal  Goal: *Progressive mobility and function (eg: ADL's)  Outcome: Progressing Towards Goal     Problem: Patient Education: Go to Patient Education Activity  Goal: Patient/Family Education  Outcome: Progressing Towards Goal

## 2020-11-10 NOTE — DISCHARGE INSTRUCTIONS
Discharge Instructions       PATIENT ID: Gold Monday  MRN: [id number]   YOB: 1950    DATE OF ADMISSION: 10/30/2020  5:02 PM    DATE OF DISCHARGE: 11/6/2020    PRIMARY CARE PROVIDER: Vincenzo Shah MD     ATTENDING PHYSICIAN: Kerrie Bernardo DO  DISCHARGING PROVIDER: Kenneth Manriquez NP    To contact this individual call [phone] and ask the  to page. If unavailable ask to be transferred the Adult Hospitalist Department. DISCHARGE DIAGNOSES     Inflammatory disease  Viral pneumonia    CONSULTATIONS: IP CONSULT TO INFECTIOUS DISEASES  IP CONSULT TO ORTHOPEDIC SURGERY    PROCEDURES/SURGERIES: * No surgery found *      FOLLOW UP APPOINTMENTS:   Follow-up Information     Follow up With Specialties Details Why Contact Info    Vincenzo Shah MD  Schedule an appointment as soon as possible for a visit f/u with PCP after discharged from hospital Patient can only remember the practice name and not the physician             ADDITIONAL CARE RECOMMENDATIONS:      Please take prednisone 20 mg once daily in the morning, starting tomorrow 11/11/2020. Please take with famotidine. Please take levaquin once daily, starting this evening x 3 days. Please follow up with your primary care physician. We have contacted him regarding your hospitalization. All records will be faxed to his office. ACTIVITY: As tolerated and per PT/OT    EQUIPMENT needed: Has needed equipment      DISCHARGE MEDICATIONS:   See Medication Reconciliation Form    · It is important that you take the medication exactly as they are prescribed. · Keep your medication in the bottles provided by the pharmacist and keep a list of the medication names, dosages, and times to be taken in your wallet. · Do not take other medications without consulting your doctor. NOTIFY YOUR PHYSICIAN FOR ANY OF THE FOLLOWING:   Fever over 101 degrees for 24 hours.    Chest pain, shortness of breath, fever, chills, nausea, vomiting, diarrhea, change in mentation, falling, weakness, bleeding. Severe pain or pain not relieved by medications. Or, any other signs or symptoms that you may have questions about.         Signed:   Ravinder Anderson NP  11/6/2020  8:28 PM

## 2020-11-10 NOTE — PROGRESS NOTES
ID Progress Note  2020     S[place]  /8  L[place]quin November 3present    Subjective:     Afebrile. Tonye Cogan His left wrist pain and bilateral ankle pain are better. Objective:     Vitals:   Visit Vitals  /87   Pulse 96   Temp 98 °F (36.7 °C)   Resp 17   Ht 6' 2.02\" (1.88 m)   Wt 99.1 kg (218 lb 8 oz)   SpO2 95%   BMI 28.04 kg/m²        Tmax:  Temp (24hrs), Av.4 °F (36.9 °C), Min:97.8 °F (36.6 °C), Max:98.9 °F (37.2 °C)      Exam:    Not in distress  No cervical lymphadenopathy  Heart: s1, s2, RRR, no murmurs rubs or clicks  The left wrist has decreased swelling. I can now extend and flex it without pain. There is still some tenderness on bilateral ankles, but it is significantly less than previous days. Speech fluent     Labs:   Lab Results   Component Value Date/Time    WBC 22.0 (H) 2020 01:00 AM    HGB 9.8 (L) 2020 01:00 AM    HCT 29.5 (L) 2020 01:00 AM    PLATELET 857 (H)  01:00 AM    MCV 85.5 2020 01:00 AM     Lab Results   Component Value Date/Time    Sodium 132 (L) 2020 01:00 AM    Potassium 3.9 2020 01:00 AM    Chloride 99 2020 01:00 AM    CO2 26 2020 01:00 AM    Anion gap 7 2020 01:00 AM    Glucose 279 (H) 2020 01:00 AM    BUN 19 2020 01:00 AM    Creatinine 1.00 2020 01:00 AM    BUN/Creatinine ratio 19 2020 01:00 AM    GFR est AA >60 2020 01:00 AM    GFR est non-AA >60 2020 01:00 AM    Calcium 9.2 2020 01:00 AM    Bilirubin, total 0.7 10/31/2020 03:16 AM    Alk. phosphatase 55 10/31/2020 03:16 AM    Protein, total 6.6 10/31/2020 03:16 AM    Albumin 3.0 (L) 10/31/2020 03:16 AM    Globulin 3.6 10/31/2020 03:16 AM    A-G Ratio 0.8 (L) 10/31/2020 03:16 AM    ALT (SGPT) 19 10/31/2020 03:16 AM       Rheumatoid factor negative   Omayra neg   Mycoplasma IgG very high, IgM negative, but respiratory pcr for mycoplasma negative.      Assessment:     #1 fever     #2 ankle tenderness     #3 groundglass opacities on CT - covid neg x 2, respiratory viral panel neg      #4 diabetes     #5 hypertension     #6 dyslipidemia    #7 superficial thrombus distal cephalic vein    Recommendations:       WBC up but this may be from steroids. He is now afebrile with decreased pain in the left wrist and bilateral ankles. He is on steroids it seems the etiology of symptoms are inflammatory rather than infectious. Still's is a consideration. levaquin until 11/12 to complete mycoplasma treatment.            Pascual Patel MD

## 2020-11-10 NOTE — ROUTINE PROCESS
Bedside shift change report given to Grant Rapp RN (oncoming nurse) by Lcaie Kumar RN (offgoing nurse). Report included the following information SBAR, Kardex, ED Summary, Procedure Summary, Intake/Output, MAR, Cardiac Rhythm NSR, Quality Measures and Dual Neuro Assessment.

## 2020-11-10 NOTE — PROGRESS NOTES
I have reviewed discharge instructions with the patient. The patient verbalized understanding. Patient home via POV, Family is driving.

## 2020-11-10 NOTE — PROGRESS NOTES
Bedside shift change report given to Serge Monk RN (oncoming nurse) by Ayaka Livingston RN (offgoing nurse). Report included the following information SBAR, Intake/Output, MAR, Accordion, Recent Results, Med Rec Status, Cardiac Rhythm NSR and Dual Neuro Assessment.

## 2020-11-10 NOTE — PROGRESS NOTES
Problem: Risk for Spread of Infection  Goal: Prevent transmission of infectious organism to others  Description: Prevent the transmission of infectious organisms to other patients, staff members, and visitors. Outcome: Progressing Towards Goal     Problem: Patient Education:  Go to Education Activity  Goal: Patient/Family Education  Outcome: Progressing Towards Goal     Problem: Falls - Risk of  Goal: *Absence of Falls  Description: Document Thang Osman Fall Risk and appropriate interventions in the flowsheet. Outcome: Progressing Towards Goal  Note: Fall Risk Interventions:  Mobility Interventions: Communicate number of staff needed for ambulation/transfer, OT consult for ADLs, Patient to call before getting OOB, PT Consult for mobility concerns, PT Consult for assist device competence, Strengthening exercises (ROM-active/passive)    Mentation Interventions: Adequate sleep, hydration, pain control, Bed/chair exit alarm, Door open when patient unattended, Evaluate medications/consider consulting pharmacy, Eyeglasses and hearing aids, Familiar objects from home, More frequent rounding, Reorient patient, Toileting rounds, Update white board    Medication Interventions: Bed/chair exit alarm, Patient to call before getting OOB, Evaluate medications/consider consulting pharmacy, Teach patient to arise slowly    Elimination Interventions: Bed/chair exit alarm, Call light in reach, Stay With Me (per policy), Patient to call for help with toileting needs, Elevated toilet seat, Toileting schedule/hourly rounds, Toilet paper/wipes in reach              Problem: Patient Education: Go to Patient Education Activity  Goal: Patient/Family Education  Outcome: Progressing Towards Goal     Problem: Pressure Injury - Risk of  Goal: *Prevention of pressure injury  Description: Document Sulaiman Scale and appropriate interventions in the flowsheet.   Outcome: Progressing Towards Goal  Note: Pressure Injury Interventions:  Sensory Interventions: Assess changes in LOC, Assess need for specialty bed, Avoid rigorous massage over bony prominences    Moisture Interventions: Absorbent underpads, Apply protective barrier, creams and emollients, Assess need for specialty bed, Check for incontinence Q2 hours and as needed    Activity Interventions: Increase time out of bed, Pressure redistribution bed/mattress(bed type), PT/OT evaluation    Mobility Interventions: Float heels, HOB 30 degrees or less, Pressure redistribution bed/mattress (bed type), PT/OT evaluation    Nutrition Interventions: Offer support with meals,snacks and hydration    Friction and Shear Interventions: HOB 30 degrees or less, Lift sheet, Minimize layers, Sit at 90-degree angle                Problem: Patient Education: Go to Patient Education Activity  Goal: Patient/Family Education  Outcome: Progressing Towards Goal     Problem: Breathing Pattern - Ineffective  Goal: *Absence of hypoxia  Outcome: Progressing Towards Goal  Goal: *Use of effective breathing techniques  Outcome: Progressing Towards Goal  Goal: *PALLIATIVE CARE:  Alleviation of Dyspnea  Outcome: Progressing Towards Goal     Problem: Patient Education: Go to Patient Education Activity  Goal: Patient/Family Education  Outcome: Progressing Towards Goal     Problem: Diabetes Self-Management  Goal: *Disease process and treatment process  Description: Define diabetes and identify own type of diabetes; list 3 options for treating diabetes. Outcome: Progressing Towards Goal  Goal: *Incorporating nutritional management into lifestyle  Description: Describe effect of type, amount and timing of food on blood glucose; list 3 methods for planning meals. Outcome: Progressing Towards Goal  Goal: *Incorporating physical activity into lifestyle  Description: State effect of exercise on blood glucose levels.   Outcome: Progressing Towards Goal  Goal: *Developing strategies to promote health/change behavior  Description: Define the ABC's of diabetes; identify appropriate screenings, schedule and personal plan for screenings. Outcome: Progressing Towards Goal  Goal: *Using medications safely  Description: State effect of diabetes medications on diabetes; name diabetes medication taking, action and side effects. Outcome: Progressing Towards Goal  Goal: *Monitoring blood glucose, interpreting and using results  Description: Identify recommended blood glucose targets  and personal targets. Outcome: Progressing Towards Goal  Goal: *Prevention, detection, treatment of acute complications  Description: List symptoms of hyper- and hypoglycemia; describe how to treat low blood sugar and actions for lowering  high blood glucose level. Outcome: Progressing Towards Goal  Goal: *Prevention, detection and treatment of chronic complications  Description: Define the natural course of diabetes and describe the relationship of blood glucose levels to long term complications of diabetes.   Outcome: Progressing Towards Goal  Goal: *Developing strategies to address psychosocial issues  Description: Describe feelings about living with diabetes; identify support needed and support network  Outcome: Progressing Towards Goal  Goal: *Insulin pump training  Outcome: Progressing Towards Goal  Goal: *Sick day guidelines  Outcome: Progressing Towards Goal  Goal: *Patient Specific Goal (EDIT GOAL, INSERT TEXT)  Outcome: Progressing Towards Goal     Problem: Patient Education: Go to Patient Education Activity  Goal: Patient/Family Education  Outcome: Progressing Towards Goal     Problem: Discharge Planning  Goal: *Discharge to safe environment  Outcome: Progressing Towards Goal     Problem: Sepsis: Day 2  Goal: Off Pathway (Use only if patient is Off Pathway)  Outcome: Progressing Towards Goal  Goal: *Central Venous Pressure maintained at 8-12 mm Hg  Outcome: Progressing Towards Goal  Goal: *Hemodynamically stable  Outcome: Progressing Towards Goal  Goal: *Tolerating diet  Outcome: Progressing Towards Goal  Goal: Activity/Safety  Outcome: Progressing Towards Goal  Goal: Consults, if ordered  Outcome: Progressing Towards Goal  Goal: Diagnostic Test/Procedures  Outcome: Progressing Towards Goal  Goal: Nutrition/Diet  Outcome: Progressing Towards Goal  Goal: Discharge Planning  Outcome: Progressing Towards Goal  Goal: Medications  Outcome: Progressing Towards Goal  Goal: Respiratory  Outcome: Progressing Towards Goal  Goal: Treatments/Interventions/Procedures  Outcome: Progressing Towards Goal  Goal: Psychosocial  Outcome: Progressing Towards Goal     Problem: Sepsis: Day 3  Goal: Off Pathway (Use only if patient is Off Pathway)  Outcome: Progressing Towards Goal  Goal: *Central Venous Pressure maintained at 8-12 mm Hg  Outcome: Progressing Towards Goal  Goal: *Oxygen saturation within defined limits  Outcome: Progressing Towards Goal  Goal: *Vital sign stability  Outcome: Progressing Towards Goal  Goal: *Tolerating diet  Outcome: Progressing Towards Goal  Goal: *Demonstrates progressive activity  Outcome: Progressing Towards Goal  Goal: Activity/Safety  Outcome: Progressing Towards Goal  Goal: Consults, if ordered  Outcome: Progressing Towards Goal  Goal: Diagnostic Test/Procedures  Outcome: Progressing Towards Goal  Goal: Nutrition/Diet  Outcome: Progressing Towards Goal  Goal: Discharge Planning  Outcome: Progressing Towards Goal  Goal: Medications  Outcome: Progressing Towards Goal  Goal: Respiratory  Outcome: Progressing Towards Goal  Goal: Treatments/Interventions/Procedures  Outcome: Progressing Towards Goal  Goal: Psychosocial  Outcome: Progressing Towards Goal     Problem:  Body Temperature -  Risk of, Imbalanced  Goal: *Absence of heat stress or hyperthermia signs and symptoms  Outcome: Progressing Towards Goal  Goal: *Absence of cold stress or hypothermia signs and symptoms  Outcome: Progressing Towards Goal     Problem: Patient Education: Go to Patient Education Activity  Goal: Patient/Family Education  Outcome: Progressing Towards Goal     Problem: Delirium Treatment  Goal: *Level of consciousness restored to baseline  Outcome: Progressing Towards Goal  Goal: *Level of environmental perceptions restored to baseline  Outcome: Progressing Towards Goal  Goal: *Sensory perception restored to baseline  Outcome: Progressing Towards Goal  Goal: *Emotional stability restored to baseline  Outcome: Progressing Towards Goal  Goal: *Functional assessment restored to baseline  Outcome: Progressing Towards Goal  Goal: *Absence of falls  Outcome: Progressing Towards Goal  Goal: *Will remain free of delirium, CAM Score negative  Outcome: Progressing Towards Goal  Goal: *Cognitive status will be restored to baseline  Outcome: Progressing Towards Goal  Goal: Interventions  Outcome: Progressing Towards Goal     Problem: Patient Education: Go to Patient Education Activity  Goal: Patient/Family Education  Outcome: Progressing Towards Goal

## 2020-11-10 NOTE — ADT AUTH CERT NOTES
Shandra Horta Adult-Extended Stay (11/8/2020) by Fina Larsen RN  
 
   
Review Status  Review Entered In Primary  11/9/2020 13:35   
   
Criteria Review REVIEW SUMMARY 
  
Patient: Ysabel Bui Review Number: [id number] Review Status: In Primary 
  
Condition Specific: Yes 
  
Condition Level Of Care Code: ACUTE Condition Level Of Care Description: Acute 
  
  
OUTCOMES Outcome Type: Primary 
  
  
  
REVIEW DETAILS 
  
Service Date: 11/08/2020 Admit Date: 11/01/2020 Product: Shandra Horta Adult Subset: Extended Stay (Symptom or finding within 24h) ~--Admin,  [place] on 11- 01:35 PM--~ Interval history / Subjective: Follow up fevers. Patient seen and examined. States ankles mildly improved. Initiated on oral prednisone yesterday. Breathing stable. Discussed with patient's son via phone. Also discussed case with rheumatology. Recommend CRP, ferritin, Sed rate, solumedrol 125 q 6 hours.  
      
    Assessment & Plan: 
      
    Sepsis:  
    -unclear etiology, suspected pulmonary origin with bilateral opacities on CT scan  
    -ID following, will discontinue zosyn and remain levofloxacin  
    -Leukocytosis improving, fevers persistent but less frequent 
    -has possible small dental abscess but less likely source for systemic infection 
    -COVID 19 test negative 
      
    Joint swelling:  
    -has multiple joints involved including bilateral ankles, left wrist 
    -discussed with rheumatology 11/8. Concern for Still's disease. Order ferritin, CRP, Sed Rate. Initiate on methylpred 125 mg q 6 hours- okay by ID 
    -Prior arthrocentesis attempted on ankle but unsuccessful 
    -Low suspicion for gout given low uric acid 
    -hold NSAIDs due to prior history of renal dysfunction  
    -Left wrist x-ray negative 
      
    Left upper extremity swelling: 
    -Superficial thrombus, can be source of fever 
      
    Metabolic encephalopathy: resolved -Secondary to above 
      
    Acute respiratory failure with hypoxia: resolved 
      
    Type 2 diabetes with hyperglycemia: 
    -HA1C 7.5 
    -Continue SSI, monitor for hypoglycemia with steroids 
      
    Hypertension:  Stable, cont amlodipine, cozaar; monitor BP 
      
    Dyslipidemia:  cont preadmission medication. 
      
    Hypokalemia: replete and repeat lab in AM 
      
    Code status: Full DVT prophylaxis: scd 
      
    Care Plan discussed with: Patient/Family and Nurse Anticipated Disposition: Home w/Family Anticipated Discharge: 24 hours to 48 hours Patient has been accepted at outside hospital.  He is pending bed availability. 
      
    Hospital Problems      Date Reviewed: 10/31/2020 
                                                    Codes   Class    Noted    POA 
             Encephalopathy ICD-10-CM: G93.40 ICD-9-CM: 348.30                   11/1/2020          Unknown 
               
             * (Principal) Acute delirium          ICD-10-CM: R41.0 ICD-9-CM: 780.09                   10/30/2020        Yes 
               
      
      
      
      
    Review of Systems: A comprehensive review of systems was negative.  
     Physical Examination: 
      
      
                                                     
    Constitutional:              No acute distress, cooperative, pleasant ENT:  Oral mucosa moist, oropharynx benign. Resp:              CTA bilaterally. No wheezing/rhonchi/rales. No accessory muscle use CV:    Regular rhythm, normal rate, no murmurs, gallops, rubs GI:     Soft, non distended, non tender. normoactive bowel sounds, no hepatosplenomegaly Musculoskeletal:         Warm, 2+ pulses throughout; bilateral ankles and left wrist swollen, nonpitting, with pain with minimal movement Neurologic:    Moves all extremities Labs:  wbc 16.7, H/H 9.5/29.6, na 134, glu 159 ID PROGRESS NOTE: 
    Physical Examination:                                                 
    Constitutional:              No acute distress, cooperative, pleasant ENT:  Oral mucosa moist, oropharynx benign. Resp:              CTA bilaterally. No wheezing/rhonchi/rales. No accessory muscle use CV:    Regular rhythm, normal rate, no murmurs, gallops, rubs GI:     Soft, non distended, non tender. normoactive bowel sounds, no hepatosplenomegaly Musculoskeletal:         Warm, 2+ pulses throughout; bilateral ankles and left wrist swollen, nonpitting, with pain with minimal movement Neurologic:    Moves all extremities Exam: Not in distress Pink conjunctivae, anicteric sclerae No cervical lymphadenopathy Heart: s1, s2, RRR, no murmurs rubs or clicks Abdomen: soft nontender, no guarding or rebound The left wrist has decreased swelling. I can now extend and flex it without pain. There is still some tenderness on bilateral ankles, but it is significantly less than previous days. Speech fluent Rheumatoid factor negative Omayra neg Mycoplasma IgG very high, IgM negative, but respiratory pcr for mycoplasma negative.  
      
     
    Assessment: 
      
    #1 fever 
      
    #2 ankle tenderness 
      
    #3 groundglass opacities on CT - covid neg x 2, respiratory viral panel neg  
      
    #4 diabetes 
      
    #5 hypertension 
      
    #6 dyslipidemia 
      
    #7 superficial thrombus distal cephalic vein 
      
     
    Recommendations: 
      
    The patient continues to have fever.  His main symptom is tenderness and warmth on bilateral ankles.    Arthrocentesis was done but was not able to obtain any fluid. Steroids started by primary team.  There is some suspicion that he could have stills disease.   I am okay with keeping him on steroids especially since I do not think the ankle swelling and pain are due to an infection. 
      
      
     He also has a superficial distal cephalic vein thrombosis. The IV line that was in the left forearm area has been removed. This by itself can cause fever. 
      
      
    Discontinue Zosyn 
      
    Gray Hoffmann MD 
      
    MEDS:   
     Norvasc 5mg po d, Cozaar 100mg pod, Pepcid 20mg po d, Heparin 5000u sq q8, SSI x 1, Levaquin 500mg IV q 24h, SOluMedrol 125mg IV q 6h, Crestor 10mg po hs, Mag Ox 400mg po bid,  
     LR 50h, Zosyn 3.375g IV q8, Deltasone 20mg po x 1 
     
     
     
  
    (Excludes PO medications unless noted) [X] Select Level of Care, One: 
            [X] ACUTE, >= One: 
                [X] Infection, One: 
                    [X] Partial responder, not clinically stable for discharge and requires continued stay, >= One: 
                        [X] Infection actual or suspected and, Both: 
                            [X] Finding, >= One: 
                                [X] Temperature, >= One: 
                                    [X] > 99.4°F(37.4°C) PO 
                                    ~--Admin, IQ Admin Admin on 11- 01:28 PM--~ 
                                    VS: 99.5, 78, 14, 97, 137/59 [X] WBC >= 13,000/cu.mm(13x10 exp 9/L) 
                                ~--Admin, IQ Admin Admin on 11- 01:28 PM--~ 
                                wbc 16.7                             [X] Intervention, >= One: 
                                [X] Culture pending <= 2d 
                                ~--Admin, IQ Admin Admin on 11- 01:30 PM--~ 
                                blood cx 11/8- no growth after 23 h 
                                 
   
Version: [employer]Qual® 2019 Jose Eduardo Andrade  © 2019 [employer] and/or one of its [place]. All Rights Reserved. CPT only © 2018 American [employer] Association. All Rights Reserved.  
   
LOC:Acute Adult-Infection: General (11/7/2020) by Bernie Burt RN  
 
   
Review Status  Review Entered In Primary  11/9/2020 13:24   
   
Criteria Review REVIEW SUMMARY 
  
Patient: Radha Box Review Number: [id number] Review Status: In Primary 
  
Condition Specific: Yes 
  
Condition Level Of Care Code: ACUTE Condition Level Of Care Description: Acute 
  
  
OUTCOMES Outcome Type: Primary 
  
  
  
REVIEW DETAILS 
  
Service Date: 11/07/2020 Admit Date: 11/01/2020 Product: Davide Dana Adult Subset: Infection: General 
    (Symptom or finding within 24h) ~--Admin,  [facility] on 11- 01:24 PM--~ 
    HOSPITALISTS: 
    Assessment & Plan: 
      
    Sepsis:  
    -unclear etiology, possibly pulmonary with bilateral opacities -ID following, continue broad-spectrum antibiotics -Leukocytosis improving, fevers persistent but less frequent 
    -has possible small dental abscess but less likely source for systemic infection 
    -COVID 19 test negative 
      
    Joint swelling:  
    -has multiple joints involved including bilateral ankles, left wrist 
    -Prior arthrocentesis attempted on ankle but unsuccessful 
    -Low suspicion for gout given low uric acid 
    -Possible inflammatory disease.  negative anti-CCP, WILLIAM. Will initiate on prednisone 20 mg twice daily and scheduled ibuprofen 400 mg 3 times daily with famotidine twice daily     -Left wrist x-ray negative 
      
    Left upper extremity swelling: 
    -Superficial thrombus, can be source of fever 
      
    Metabolic encephalopathy: resolved 
    -Secondary to above 
      
 Acute respiratory failure with hypoxia: resolved 
      
    Type 2 diabetes with hyperglycemia: 
    -HA1C 7.5 
    -Continue SSI, monitor for hypoglycemia with steroids 
      
    Hypertension:  Stable, cont amlodipine, cozaar; monitor BP 
      
    Dyslipidemia:  cont preadmission medication. 
      
    Hypokalemia: replete and repeat lab in AM 
      
    Code status: Full DVT prophylaxis: scd 
      
    Care Plan discussed with: Patient/Family and Nurse Anticipated Disposition: Home w/Family Anticipated Discharge: 24 hours to 48 hours Patient has been accepted at outside hospital.  He is pending bed availability. 
      
    Hospital Problems      Date Reviewed: 10/31/2020 
                                                    Codes   Class    Noted    POA 
             Encephalopathy ICD-10-CM: G93.40 ICD-9-CM: 348.30                   11/1/2020          Unknown 
               
             * (Principal) Acute delirium          ICD-10-CM: R41.0 ICD-9-CM: 780.09                   10/30/2020        Yes 
     
     
    ~--Admin, IQ Admin Admin on 11- 01:17 PM--~ 
    ID Progress Note 11/7/2020 
      
    Subjective: 
      
    Having fevers. He continues to have bilateral ankle pain. He now has proximal forearm pain and left wrist pain. Exam: Not in distress Heart: s1, s2, RRR, no murmurs rubs or clicks Abdomen: soft nontender, no guarding or rebound Left proximal/ forearm elbow area is swollen. Left wrist is swollen. Speech fluent Labs:  wbc 12.6, H/H 8.8/27.4, na 134, K 3.3, glu 123 Rheumatoid factor negative Omayra neg     Mycoplasma IgG very high, IgM negative, but respiratory pcr for mycoplasma negative.  
      
    Assessment: 
      
    #1 fever 
      
    #2 ankle tenderness 
      
    #3 groundglass opacities on CT - covid neg x 2, respiratory viral panel neg  
      
    #4 diabetes 
      
 #5 hypertension 
      
    #6 dyslipidemia 
      
    #7 superficial thrombus distal cephalic vein 
      
    Recommendations: 
      
    The patient continues to have fever.  His main symptom is tenderness and warmth on bilateral ankles.    Arthrocentesis was done but was not able to obtain any fluid. Steroids started by primary team.  
      
    His left wrist is also now swollen. There was an IV present there yesterday. He has at least 3 joints involved now. His mycoplasma IgG titer is very high although his IgM and mycoplasma PCR from his nasopharynx are negative. Mycoplasma is known to cause extrapulmonary manifestations. Rarely it can cause arthritis.   
      
     He also has a superficial distal cephalic vein thrombosis. The IV line that was in the left forearm area has been removed. This by itself can cause fever. 
      
      
    Continue abx for now. Let's see how the steroids do.  
      
    Gray Floyd MD 
      
    Meds:  Tyl 650mg po x 1, Norvasc 5mg po d, Cozaar 100mg pod, Pepcid 20mg po d, Heparin 5000u sq q8h, SSI x 2, Levaquin 500mg IV q 24h, Crestor 10mg po hs, Mag ox 400mg po bid, KCl 40meq po now, Motrin 400mg po tid, LR 50h, Zosyn 3.375g IV q8, Deltasone 20mg po bid, 
     
     
     
  
    (Excludes PO medications unless noted) [X] Select Day, One: 
            [X] Episode Day 4-6, One: 
                [X] ACUTE, One: 
                    [X] Partial responder, not clinically stable for discharge and requires continued stay, >= One: 
                        [X] Fever, new onset and, Both: 
                            [X] Temperature, >= One: 
                                [X] > 99.4°F(37.4°C) PO 
                                ~--Admin, IQ Admin Admin on 11- 01:13 PM--~ 
                                BP   134/76 (BP 1 Location: Right arm, BP Patient Position: At rest;Supine) Pulse           88 Temp           (!) 101.9 °F (38.8 °C) Resp            24 Ht    6' 2\" (1.88 m) Wt   123.7 kg (272 lb 11.3 oz) SpO2           94% BMI 35.01 kg/m² 
                                  
                                  
                                Tmax:  Temp (24hrs), Av.6 °F (37.6 °C), Min:98.4 °F (36.9 °C), Max:101.9 °F (38.8 °C) [X] Intervention, >= One: 
                                [X] Imaging study <= 24h 
                                ~--Admin, IQ Admin Admin on 2020 01:14 PM--~ 
                                - left wrist: No acute abnormality. 
                                 
                                 
                                 
  
Version: [employer]® 2019 [employer]  © 2019 [employer] 6199 and/or one of its [place]. All Rights Reserved. CPT only © 2018 American Medical Association. All Rights Reserved.  
   
LOC:Acute Adult-Infection: General (2020) by Alem Peterson RN  
 
   
Review Status  Review Entered In Primary  2020 13:11   
   
Criteria Review REVIEW SUMMARY 
  
Patient: Tresa Raphael Review Number: [id number] Review Status: In Primary 
  
Condition Specific: Yes 
  
Condition Level Of Care Code: ACUTE Condition Level Of Care Description: Acute 
  
  
OUTCOMES Outcome Type: Primary 
  
  
  
REVIEW DETAILS 
  
Service Date: 2020 Admit Date: 2020 Product: George Alonzo Adult Subset: Infection: General 
    (Symptom or finding within 24h)     ~--Admin,  [place] on 2020 01:11 PM--~ 
    ID Progress Note 
    2020 
      
    Subjective: 
      
 Having fevers. He continues to have bilateral ankle pain. He now has proximal forearm pain and left wrist pain. No dyspnea, abdominal pain, headache or sore throat.  
      
    ROS: No anaphylaxis, seizures, syncope, hematemesis, hematochezia Vitals:  
    Visit Vitals BP     (!) 123/56 (BP 1 Location: Right arm, BP Patient Position: At rest) Pulse             91 
    Temp             99 °-101.6Resp 22 Ht      6' 2\" (1.88 m) Wt     122.8 kg (270 lb 12.8 oz) SpO2             95% BMI   34.77 kg/m² Exam: Not in distress Pink conjunctivae, anicteric sclerae No cervical lymphdenopathy Lung clear, no rales, wheezes or rhonchi Heart: s1, s2, RRR, no murmurs rubs or clicks Abdomen: soft nontender, no guarding or rebound Left proximal/ forearm elbow area is swollen. Left wrist is swollen. There was an IV line near the left wrist yesterday. Speech fluent  
     Labs:  wbc 13.4, H/H 8.5/26, na 134, K 3.4, glu 146 Rheumatoid factor negative Omayra neg Mycoplasma IgG very high, IgM negative, but respiratory pcr for mycoplasma negative.  
      
     
    Assessment: 
      
    #1 fever 
      
    #2 ankle tenderness 
      
    #3 groundglass opacities on CT - covid neg x 2, respiratory viral panel neg  
      
    #4 diabetes 
      
    #5 hypertension 
      
    #6 dyslipidemia 
      
    #7 superficial thrombus distal cephalic vein 
      
     
    Recommendations: 
      
    The patient continues to have fever.  His main symptom is tenderness and warmth on bilateral ankles.    Arthrocentesis was done but was not able to obtain any fluid. Ortho has recommended starting steroids.   
      
    His left wrist is also now swollen. There was an IV present there yesterday. He has at least 3 joints involved now.   His mycoplasma IgG titer is very high although his IgM and mycoplasma PCR from his nasopharynx are negative. Mycoplasma is known to cause extrapulmonary manifestations. Rarely it can cause arthritis.   
      
     He also has a superficial distal cephalic vein thrombosis. The IV line that was in the left forearm area has been removed. This by itself can cause fever. 
      
      
    Continue abx for now. 
      
    Felix Sanchez MD 
      
    Brief Ortho Note:.  
      
    - Pt ankle pain and swelling have not improved; Exam not changed, no erythema, no warmth to Bilat ankle, moderate swelling, severe pain with palpation or passive ROM 
    - Gout still primary suspected etiology for symptoms. Recommendation was for initiation of steroids per medical team if they agreed - no steroids have been started. WBC normalized, afebrile overnight, no growth on blood cultures. No having R forearm swelling and pain thought to be from thrombophlebitis/infiltration.  
    - Discussed with Dr. Clary Morales; if patient does not improve recommend MRI Bilat ankle to define process, eval for effusion; If there is large effusion we will re- attempt aspiration. Meds: Tyl 650mg po x 2, Norvasc 5mg po d, Cozaar 100mg po d, Heparin 5000u sq q8, SSI x 3, Levaquin 500mg IV q 24h, Crestor 10mg po hs, LR 50h, Zosyn 3.375g IV q8h   
    (Excludes PO medications unless noted) [X] Select Day, One: 
            [X] Episode Day 4-6, One: 
                [X] ACUTE, One: 
                    [X] Partial responder, not clinically stable for discharge and requires continued stay, >= One: 
                        [X] Fever, new onset and, Both: 
                            [X] Temperature, >= One: 
                                [X] > 99.4°F(37.4°C) PO 
                                ~--Admin, IQ Admin Admin on 11- 01:03 PM--~ 
                                .6 [X] Intervention, >= One: 
                                [X] Imaging study <= 24h 
                                ~--Admin, IQ Admin Admin on 11- 01:06 PM--~ 
                                ·      No evidence of lower extremity vein thrombosis in the visualized vein segments bilaterally. ·      Incidental finding of prominent groin lymph nodes bilaterally. 
                                 
                                 
                                 
  
Version: InterQual® 2019 Chelsea Ring  © 2019 [employer] 6199 and/or one of its [place]. All Rights Reserved. CPT only © 2018 American Medical Association. All Rights Reserved.  
   
LOC:Acute Adult-General Medical (11/5/2020) by Ron Marin RN  
 
   
Review Status  Review Entered In Primary  11/6/2020 10:54   
   
Criteria Review REVIEW SUMMARY 
  
Patient: Chuckie Liz Review Number: [id number] Review Status: In Primary 
  
Condition Specific: Yes 
  
Condition Level Of Care Code: ACUTE Condition Level Of Care Description: Acute 
  
  
OUTCOMES Outcome Type: Primary 
  
  
  
REVIEW DETAILS 
  
Service Date: 11/05/2020 Admit Date: 11/01/2020 Product: Ladene Plain Adult Subset: General Medical 
    (Symptom or finding within 24h) 
  (Excludes PO medications unless noted) [X] Select Day, One: 
            [X] Episode Day 3-X, One: 
            ~--Admin, IQ Admin Admin on 11- 10:54 AM--~ 
            11/5/2020 PROGRESS NOTE: 
            Admission Summary:  
            This is a 71-year-old man with a past medical history significant for type 2 diabetes, hypertension, dyslipidemia, was in his usual state of health until the day of his presentation at the emergency room when the patient developed a change in mental status.  The patient reported to his dentist's office and underwent a number of tooth extractions.  The procedure finishing at about 10:00 a.m.  The patient developed a change in mental status after the procedure. Sheryl Lafleur was monitored at the dentist's office without any improvement in his mental status.  The patient was at baseline before the procedure according to report. Edilberto Prince patient was also found to be hypoxic with oxygen saturation of 88%.  The oxygen saturation improved with supplemental oxygen.  Because of the persistent change in mental status, the patient was sent to the emergency room at the [facility] in [place] [address] patient has no history of congestive heart failure or respiratory disease. [facility] was no sick contact or contact with any person with COVID-19 virus infection.  When the patient arrived at the emergency room, the patient was found to have a fever of 101.9, significant leukocytosis, and elevated lactic acid level.  Lumbar puncture was performed.  The patient received antibiotics and was referred to the hospitalist service for evaluation for admission.  CT scan of the head done on arrival at the emergency room did not show any acute intracranial abnormalities.  No record of prior admission to this hospital. 
              
              
            Interval history / Subjective: 
            Pt seen in bed in [facility]. He reports that he feels better. Discussed recurrent fever spikes and increasing WBC count as well as possible dental abscess seen on imaging. Pt LUE is extremely swollen and he states this has been this way for \"a while now. \" He endorses numbness to arm/hand. Pulse is 2+ in radial. 
              
            Spoke with patient's son regarding possible transfer to hospital in [place], which is what they prefer. CM to call with transportation pricing. If family agrees, will initiate transfer in AM to either [facility] or [facility] in [place], [place].  
            Assessment & Plan: 
              
 Acute delirium.  metabolic encephalopathy resolved 
             opiates related ? From dental procedure drug screen positive - CT head negative  
            -Patient may have obstructive sleep apnea ABG showed some retention of CO2 placed on CPAP now more clear 
              
             Acute respiratory failure with hypoxia. Nic Baldwin - pt seen breathing normally in RA while examined - CT chest reviewed  
              
            Type 2 diabetes with hyperglycemia. A1c 7.5 
            -Cont AC/HS accuchecks w/ SSI 
            -trend BS 
              
            Sepsis. Unclear etiology may be mandibular abscess seen in CT maxillofacial  
            -The diagnosis of sepsis is supported by elevated lactic acid level, fever, and significant leukocytosis.   
            - Patient on vancomycin and Zosyn. LP shows normal CSF 
            -Ortho attempted to aspirate ankle joint fluid 11/4 unsuccessfully - All CT reviewed  Still having fever ID consulted - COVID test neg x2 
            -Appreciate ID input  
              
            Hypertension.  Stable, cont amlodipine, cozaar; monitor BP 
              
            Dyslipidemia.  cont preadmission medication. 
              
            Hypokalemia - replete and repeat lab in AM 
              
            Code status: Full DVT prophylaxis: scd 
              
            Care Plan discussed with: Patient/Family and Nurse Anticipated Disposition: Home w/Family Anticipated Discharge: 24 hours to 48 hours Cussed with patient's son over the phone called dental surgeon and discussed the case as well BP          135/74 (BP 1 Location: Right arm, BP Patient Position: At rest) Pulse     74 Temp     99.7 °F (37.6 °C) Resp      17 Ht           6' 2\" (1.88 m) Wt          122 kg (268 lb 15.4 oz) SpO2     97% Physical Examination: 
              
              
                                                         
            Constitutional:      No acute distress, cooperative, pleasant  
            ENT:       Oral mucosa moist, oropharynx benign. Resp:      CTA bilaterally. No wheezing/rhonchi/rales. No accessory muscle use CV:         Regular rhythm, normal rate, no murmurs, gallops, rubs GI:          Soft, non distended, non tender. normoactive bowel sounds, no hepatosplenomegaly Musculoskeletal:              Warm, 2+ pulses throughout; PREM edema; [place] feet edematous Neurologic:         Moves all extremities. AAOx3, CN II-XII reviewed Psych:  Good insight, Not anxious nor agitated. I&D Progress Note: 
            Subjective: 
              
            Having fevers. He continues to have bilateral ankle pain. He now has proximal forearm pain. No dyspnea, abdominal pain, headache or sore throat.  
              
            ROS: No anaphylaxis, seizures, syncope, hematemesis, hematochezia  
               
            Exam: Not in distress Pink conjunctivae, anicteric sclerae No cervical lymphdenopathy Lung clear, no rales, wheezes or rhonchi Heart: s1, s2, RRR, no murmurs rubs or clicks Abdomen: soft nontender, no guarding or rebound Bilateral ankles are swollen warm and tender Left proximal/ forearm elbow area is swollen. There is an IV line there. Speech fluent             Assessment: 
              
            #1 fever 
              
            #2 ankle tenderness 
              
            #3 groundglass opacities on CT - covid neg x 2, respiratory viral panel neg  
              
            #4 diabetes 
              
            #5 hypertension 
              
            #6 dyslipidemia 
              
            Recommendations: 
              
            The patient continues to have fever.  His main symptom is tenderness and warmth on bilateral ankles.  He tells me that this happens once in a while And it has been happening for a long time now. Arthrocentesis was done but was not able to obtain any fluid. Ortho has recommended starting steroids. I am okay with this. Labs: WBC 15.6, hgb 9.0/ hct 27.2, na 135, potassium 3.3, glucose 125, bun/crt ratio 11 Lower ext: Interpretation Summary ·             Evidence of superficial thrombus of the left distal cephalic arm vein. ·             No evidence of left upper extremity deep vein thrombosis in the visualized vein segments. Meds: tylenol 650mg po q6 prn x2, norvasc 5mg po qday, cozaar 100mg, hepairn 5,000 units sc q8, humalog ssi qid sc, levaquin 500mg iv q24, zosyn 3.375 g iv q8, crestor 10mg qhs po, potassium 40meq po x1 now,  
             
             
              
             
             
              
            The left forearm is swollen. There is a IV on the left elbow area. The patient may have thrombophlebitis. I have asked the nurse to take the line out. Duplex scan of the left forearm was done and it is pending. [X] ACUTE, >= One: 
                    [X] Neurological, One: 
                        [X] Partial responder, not clinically stable for discharge and requires continued stay, >= One: 
                            [X] Fever, new onset and, Both: 
                                [X] Temperature, >= One: 
                                    [X] > 99.4°F(37.4°C) PO ~--Admin, IQ Admin Admin on 11- 10:47 AM--~ 
                                    101.5, 100.3 [X] Intervention, >= One: 
                                    [X] Culture pending <= 2d 
                                    ~--Admin, IQ Admin Admin on 11- 10:47 AM--~ 
                                    CSF cultures pending 
                                     
                                     
                                     
  
Version: [employer]® 2019 Alyson Rivera  © 2019 [employer] and/or one of its [place]. All Rights Reserved. CPT only © 2018 [employer]. All Rights Reserved.

## 2020-11-11 ENCOUNTER — PATIENT OUTREACH (OUTPATIENT)
Dept: CASE MANAGEMENT | Age: 70
End: 2020-11-11

## 2020-11-11 NOTE — PROGRESS NOTES
- Care transitions    Rajan Roman RN, [facility], [facility]  Care transitions nurse [phone]  [facility] Coordination Team    Call to and reached pt's wife - who states Mr. Kevin Esquivel was d/c yesterday 11/10 - and the family drove home to [facility] - pt's son has taken the medical records to the pcp there - and is picking up the medications -She feels pt's ankles have some swelling today. Medications reviewed -   Pt was encouraged to monitor:  Daily temperature - (sepsis and Covid watch)  BP - am and pm and record. Daily am weight - for fluid status. Patient contacted regarding recent discharge and COVID-19 risk. Discussed COVID-19 related testing which was available at this time. Test results were negative. Patient informed of results, if available? yes    Care Transition Nurse/ Ambulatory Care Manager/ LPN Care Coordinator contacted the family by telephone to perform post discharge assessment. Verified name and  with family as identifiers. Patient has following risk factors of: sepsis,  Acute delirium, encephalopathy    CTN/ACM/LPN reviewed discharge instructions, medical action plan and red flags related to discharge diagnosis. Reviewed and educated them on any new and changed medications related to discharge diagnosis. Advised obtaining a 90-day supply of all daily and as-needed medications. Advance Care Planning:   Does patient have an Advance Directive: currently not on file; education provided     Education provided regarding infection prevention, and signs and symptoms of COVID-19 and when to seek medical attention with family who verbalized understanding. Discussed exposure protocols and quarantine from [address] you at higher risk for severe illness  and given an opportunity for questions and concerns. The family agrees to contact the COVID-19 hotline [phone] or PCP office for questions related to their healthcare.  CTN/ACM/LPN provided contact information for future reference. From CDC: Are you at higher risk for severe illness?  Wash your hands often.  Avoid close contact (6 feet, which is about two arm lengths) with people who are sick.  Put distance between yourself and other people if COVID-19 is spreading in your community.  Clean and disinfect frequently touched surfaces.  Avoid all cruise travel and non-essential air travel.  Call your healthcare professional if you have concerns about COVID-19 and your underlying condition or if you are sick. For more information on steps you can take to protect yourself, see CDC's How to Protect Yourself      Patient/family/caregiver given information for [employer] Loop and agrees to enroll no  Patient's preferred e-mail:  Declined   Patient's preferred phone number: [phone]  Based on Loop alert triggers, patient will be contacted by nurse care manager for worsening symptoms. Plan for follow-up call in 5-7 days based on severity of symptoms and risk factors. [facility]  _________________________________________________________________________________    Inf disease note -  Assessment:      #1 fever   #2 ankle tenderness   #3 groundglass opacities on CT - covid neg x 2, respiratory viral panel neg    #4 diabetes   #5 hypertension   #6 dyslipidemia   #7 superficial thrombus distal cephalic vein    41/9/1169 10:31 AM - [place]     Component  Value  Flag  Ref Range  Units  Status    Specimen source  Nasopharyngeal       Final    SARS-CoV-2  Not detected   NOTD     Final    Comment:         The specimen is NEGATIVE for SARS-CoV2, the novel coronavirus associated with COVID-19. A negative result does not rule out COVID-19.       Component  Value  Ref Range & Units  Status    Source  URINE     Final    L pneumophila S1 Ag, urine  Negative  Negative    Final    11/9/2020  5:36 PM - [place]     Component  Value  Ref Range & Units  Status    Source  URINE    Final    Specimen  Urine   Final    Streptococcus pneumoniae Ag  Negative  Negative    Final    Fluid culture  Not indicated. Final    Organism ID  Not indicated.    Final

## 2020-11-13 LAB
BACTERIA SPEC CULT: NORMAL
SERVICE CMNT-IMP: NORMAL

## 2020-12-03 NOTE — ADT AUTH CERT NOTES
[facility] (2020) by Otoniel Enrique RN         Review Status    In Primary        Criteria Review    REVIEW SUMMARY     Patient: Emily Gonzalez  Review Number: [id number]  Review Status: In Primary     Condition Specific: Yes     Condition Level Of Care Code: ACUTE  Condition Level Of Care Description: Acute        OUTCOMES  Outcome Type: Primary           REVIEW DETAILS     Service Date: 2020  Admit Date: 10/30/2020  Product: [facility]  Subset: Extended Stay      (Symptom or finding within 24h)         (Excludes PO medications unless noted)          [X] Select Level of Care, One:              [X] ACUTE, >= One:                  [X] Infection, One:                      [X] Partial responder, not clinically stable for discharge and requires continued stay, >= One:                      ~--Admin, IQ Admin Admin on 2020 03:37 PM--~                      ID Progress Note                      2020                                              Santiago              -                      Clifford         November 3-present                                             Subjective:                                             Afebrile. Jaci Fisher   His left wrist pain and bilateral ankle pain are better.                                                                     Objective:                                             Vitals:                       Visit Vitals                      BP  121/87                      Pulse          96                      Temp          98 °F (36.7 °C)                      Resp           17                      Ht   6' 2.02\" (1.88 m)                      Wt  99.1 kg (218 lb 8 oz)                      SpO2          95%                      BMI             28.04 kg/m²                                                                    Tmax:  Temp (24hrs), Av.4 °F (36.9 °C), Min:97.8 °F (36.6 °C), Max:98.9 °F (37.2 °C)                                               Exam:                        Not in distress                      No cervical lymphadenopathy                      Heart: s1, s2, RRR, no murmurs rubs or clicks                      The left wrist has decreased swelling. I can now extend and flex it without pain. There is still some tenderness on bilateral ankles, but it is significantly less than previous days. Speech fluent                                              Labs: wbc 22, H/H 9.8/29.5, na 132, plt 481, glu 279, alb 3, ag ratio 0.8                      Rheumatoid factor negative                       Omayra neg                       Mycoplasma IgG very high, IgM negative, but respiratory pcr for mycoplasma negative.                                              Assessment:                                             #1 fever                                             #2 ankle tenderness                                             #3 groundglass opacities on CT - covid neg x 2, respiratory viral panel neg                                              #4 diabetes                                             #5 hypertension                                             #6 dyslipidemia                                             #7 superficial thrombus distal cephalic vein                                             Recommendations:                                                                    WBC up but this may be from steroids. He is now afebrile with decreased pain in the left wrist and bilateral ankles. He is on steroids it seems the etiology of symptoms are inflammatory rather than infectious.  Giovanny Kingston is a consideration.                                              levaquin until 11/12 to complete mycoplasma treatment.                                                                                                                   Luis Shafer MD [X] Infection actual or suspected and, Both:                              [X] Finding, >= One:                                  [X] WBC >= 13,000/cu.mm(13x10 exp 9/L)                                  ~--Admin, IQ Admin Admin on 11- 03:34 PM--~                                  22                                                                                                                                    [X] Intervention, >= One:                                  [X] Anti-infective <= 5d since initiation                                  [X] Culture pending <= 2d                                  ~--Admin, IQ Admin Admin on 11- 03:36 PM--~                                  Meds: Lantus 10u sq x 1, Levaquin 500mg IV q 24h, Mag ox 400mg po bid, Norvasc 5mg po d, Cozaar 100mg po d, Pepcid 20mg po d, Heparin 5000u sq q8, SSI x 4, SoluMedrol 125mg IV q6, Crestor 10mg po hs,                                                                               LOC:Acute Adult-Extended Stay (11/8/2020) by Flaca Vargas RN         Review Status  Review Entered    In Primary  11/9/2020 13:35        Criteria Review    REVIEW SUMMARY     Patient: Lori Mcdonough  Review Number: [id number]  Review Status: In Primary     Condition Specific: Yes     Condition Level Of Care Code: ACUTE  Condition Level Of Care Description: Acute        OUTCOMES  Outcome Type: Primary           REVIEW DETAILS     Service Date: 11/08/2020  Admit Date: 11/01/2020  Product: [facility]  Subset: Extended Stay      (Symptom or finding within 24h)      ~--Admin, IQ Admin Admin on 11- 01:35 PM--~      Interval history / Subjective: Follow up fevers. Patient seen and examined. States ankles mildly improved. Initiated on oral prednisone yesterday. Breathing stable. Discussed with patient's son via phone. Also discussed case with rheumatology.  Recommend CRP, ferritin, Sed rate, solumedrol 125 q 6 hours.              Assessment & Plan:             Sepsis:       -unclear etiology, suspected pulmonary origin with bilateral opacities on CT scan       -ID following, will discontinue zosyn and remain levofloxacin       -Leukocytosis improving, fevers persistent but less frequent      -has possible small dental abscess but less likely source for systemic infection      -COVID 19 test negative             Joint swelling:       -has multiple joints involved including bilateral ankles, left wrist      -discussed with rheumatology 11/8. Concern for Still's disease. Order ferritin, CRP, Sed Rate.  Initiate on methylpred 125 mg q 6 hours- okay by ID      -Prior arthrocentesis attempted on ankle but unsuccessful      -Low suspicion for gout given low uric acid      -hold NSAIDs due to prior history of renal dysfunction       -Left wrist x-ray negative             Left upper extremity swelling:      -Superficial thrombus, can be source of fever             Metabolic encephalopathy: resolved      -Secondary to above             Acute respiratory failure with hypoxia: resolved             Type 2 diabetes with hyperglycemia:      -HA1C 7.5      -Continue SSI, monitor for hypoglycemia with steroids             Hypertension:  Stable, cont amlodipine, cozaar; monitor BP             Dyslipidemia:  cont preadmission medication.             Hypokalemia: replete and repeat lab in AM             Code status: Full      DVT prophylaxis: scd             Care Plan discussed with: Patient/Family and Nurse      Anticipated Disposition: Home w/Family      Anticipated Discharge: 24 hours to 48 hours       Patient has been accepted at outside hospital.  He is pending bed availability.             Hospital Problems      Date Reviewed: 10/31/2020                                                      Codes   Class    Noted    POA               Encephalopathy ICD-10-CM: G93.40      ICD-9-CM: 348.30        11/1/2020          Unknown                               * (Principal) Acute delirium          ICD-10-CM: R41.0      ICD-9-CM: 780.09                   10/30/2020        Yes                                                  Review of Systems:      A comprehensive review of systems was negative.        Physical Examination:                                                                          Constitutional:              No acute distress, cooperative, pleasant      ENT:  Oral mucosa moist, oropharynx benign. Resp:              CTA bilaterally. No wheezing/rhonchi/rales. No accessory muscle use      CV:    Regular rhythm, normal rate, no murmurs, gallops, rubs       GI:     Soft, non distended, non tender. normoactive bowel sounds, no hepatosplenomegaly        Musculoskeletal:         Warm, 2+ pulses throughout; bilateral ankles and left wrist swollen, nonpitting, with pain with minimal movement       Neurologic:    Moves all extremities      Labs:  wbc 16.7, H/H 9.5/29.6, na 134, glu 159            ID PROGRESS NOTE:      Physical Examination:                                                      Constitutional:              No acute distress, cooperative, pleasant      ENT:  Oral mucosa moist, oropharynx benign. Resp:              CTA bilaterally. No wheezing/rhonchi/rales. No accessory muscle use      CV:    Regular rhythm, normal rate, no murmurs, gallops, rubs       GI:     Soft, non distended, non tender. normoactive bowel sounds, no hepatosplenomegaly        Musculoskeletal:         Warm, 2+ pulses throughout; bilateral ankles and left wrist swollen, nonpitting, with pain with minimal movement       Neurologic:    Moves all extremities      Exam:        Not in distress      Pink conjunctivae, anicteric sclerae      No cervical lymphadenopathy      Heart: s1, s2, RRR, no murmurs rubs or clicks      Abdomen: soft nontender, no guarding or rebound      The left wrist has decreased swelling.   I can now extend and flex it without pain. There is still some tenderness on bilateral ankles, but it is significantly less than previous days. Speech fluent       Rheumatoid factor negative       Omayra neg       Mycoplasma IgG very high, IgM negative, but respiratory pcr for mycoplasma negative.                    Assessment:             #1 fever             #2 ankle tenderness             #3 groundglass opacities on CT - covid neg x 2, respiratory viral panel neg              #4 diabetes             #5 hypertension             #6 dyslipidemia             #7 superficial thrombus distal cephalic vein                   Recommendations:             The patient continues to have fever.  His main symptom is tenderness and warmth on bilateral ankles.    Arthrocentesis was done but was not able to obtain any fluid. Steroids started by primary team.  There is some suspicion that he could have stills disease. I am okay with keeping him on steroids especially since I do not think the ankle swelling and pain are due to an infection.                     He also has a superficial distal cephalic vein thrombosis. The IV line that was in the left forearm area has been removed.   This by itself can cause fever.                    Discontinue Zosyn             Gray Kingston MD             MEDS:         Norvasc 5mg po d, Cozaar 100mg pod, Pepcid 20mg po d, Heparin 5000u sq q8, SSI x 1, Levaquin 500mg IV q 24h, SOluMedrol 125mg IV q 6h, Crestor 10mg po hs, Mag Ox 400mg po bid,        LR 50h, Zosyn 3.375g IV q8, Deltasone 20mg po x 1                           (Excludes PO medications unless noted)          [X] Select Level of Care, One:              [X] ACUTE, >= One:                  [X] Infection, One:                      [X] Partial responder, not clinically stable for discharge and requires continued stay, >= One:                          [X] Infection actual or suspected and, Both:                              [X] Finding, >= One:                                  [X] Temperature, >= One:                                      [X] > 99.4°F(37.4°C) PO                                      ~--Admin, IQ Admin Admin on 11- 01:28 PM--~                                      VS: 99.5, 78, 14, 97, 137/59                                                                                                                                                    [X] WBC >= 13,000/cu.mm(13x10 exp 9/L)                                  ~--Admin, IQ Admin Admin on 11- 01:28 PM--~                                  wbc 16.7                                                                                                                                    [X] Intervention, >= One:                                  [X] Culture pending <= 2d                                  ~--Admin, IQ Admin Admin on 11- 01:30 PM--~                                  blood cx 11/8- no growth after 23 h                                                                                                           Version: [employer] 2019  [employer]  © 2019 [employer] and/or one of its [place]. All Rights Reserved. CPT only © 2018 [employer]. All Rights Reserved.       LOC:Acute Adult-Infection: General (11/7/2020) by Bill Khan RN         Review Status  Review Entered    In Primary  11/9/2020 13:24        Criteria Review    REVIEW SUMMARY     Patient: Timo Bell  Review Number: [id number]  Review Status:  In Primary     Condition Specific: Yes     Condition Level Of Care Code: ACUTE  Condition Level Of Care Description: Acute        OUTCOMES  Outcome Type: Primary           REVIEW DETAILS     Service Date: 11/07/2020  Admit Date: 11/01/2020  Product: [facility]  Subset: Infection: General      (Symptom or finding within 24h)      ~--Admin, IQ Admin Admin on 11- 01:24 PM--~ HOSPITALISTS:      Assessment & Plan:             Sepsis:       -unclear etiology, possibly pulmonary with bilateral opacities      -ID following, continue broad-spectrum antibiotics      -Leukocytosis improving, fevers persistent but less frequent      -has possible small dental abscess but less likely source for systemic infection      -COVID 19 test negative             Joint swelling:       -has multiple joints involved including bilateral ankles, left wrist      -Prior arthrocentesis attempted on ankle but unsuccessful      -Low suspicion for gout given low uric acid      -Possible inflammatory disease.  negative anti-CCP, WILLIAM.   Will initiate on prednisone 20 mg twice daily and scheduled ibuprofen 400 mg 3 times daily with famotidine twice daily      -Left wrist x-ray negative             Left upper extremity swelling:      -Superficial thrombus, can be source of fever             Metabolic encephalopathy: resolved      -Secondary to above             Acute respiratory failure with hypoxia: resolved             Type 2 diabetes with hyperglycemia:      -HA1C 7.5      -Continue SSI, monitor for hypoglycemia with steroids             Hypertension:  Stable, cont amlodipine, cozaar; monitor BP             Dyslipidemia:  cont preadmission medication.             Hypokalemia: replete and repeat lab in AM             Code status: Full      DVT prophylaxis: [address] [facility] [facility] discussed with: Patient/Family and Nurse      Anticipated Disposition: Home w/Family      Anticipated Discharge: 24 hours to 48 hours       Patient has been accepted at outside hospital.  He is pending bed availability.             Hospital Problems      Date Reviewed: 10/31/2020                                                      Codes   Class    Noted    POA               Encephalopathy ICD-10-CM: G93.40      ICD-9-CM: 348.30                   11/1/2020          Unknown                               * (Principal) Acute delirium ICD-10-CM: R41.0      ICD-9-CM: 780.09                   10/30/2020        Yes                  ~--Admin, IQ Admin Admin on 11- 01:17 PM--~      ID Progress Note      11/7/2020             Subjective:             Having fevers. He continues to have bilateral ankle pain. He now has proximal forearm pain and left wrist pain. Exam:        Not in distress      Heart: s1, s2, RRR, no murmurs rubs or clicks      Abdomen: soft nontender, no guarding or rebound      Left proximal/ forearm elbow area is swollen. Left wrist is swollen. Speech fluent       Labs:  wbc 12.6, H/H 8.8/27.4, na 134, K 3.3, glu 123      Rheumatoid factor negative       Omayra neg       Mycoplasma IgG very high, IgM negative, but respiratory pcr for mycoplasma negative.              Assessment:             #1 fever             #2 ankle tenderness             #3 groundglass opacities on CT - covid neg x 2, respiratory viral panel neg              #4 diabetes             #5 hypertension             #6 dyslipidemia             #7 superficial thrombus distal cephalic vein             Recommendations:             The patient continues to have fever.  His main symptom is tenderness and warmth on bilateral ankles.    Arthrocentesis was done but was not able to obtain any fluid. Steroids started by primary team.              His left wrist is also now swollen. There was an IV present there yesterday. He has at least 3 joints involved now. His mycoplasma IgG titer is very high although his IgM and mycoplasma PCR from his nasopharynx are negative. Mycoplasma is known to cause extrapulmonary manifestations. Rarely it can cause arthritis.                He also has a superficial distal cephalic vein thrombosis. The IV line that was in the left forearm area has been removed. This by itself can cause fever.                    Continue abx for now.  Let's see how the steroids do.              Meme Barbosa MD             Meds: Tyl 650mg po x 1, Norvasc 5mg po d, Cozaar 100mg pod, Pepcid 20mg po d, Heparin 5000u sq q8h, SSI x 2, Levaquin 500mg IV q 24h, Crestor 10mg po hs, Mag ox 400mg po bid, KCl 40meq po now, Motrin 400mg po tid, LR 50h, Zosyn 3.375g IV q8, Deltasone 20mg po bid,                           (Excludes PO medications unless noted)          [X] Select Day, One:              [X] Episode Day 4-6, One:                  [X] ACUTE, One:                      [X] Partial responder, not clinically stable for discharge and requires continued stay, >= One:                          [X] Fever, new onset and, Both:                              [X] Temperature, >= One:                                  [X] > 99.4°F(37.4°C) PO                                  ~--Admin, IQ Admin Admin on 2020 01:13 PM--~                                  BP   134/76 (BP 1 Location: Right arm, BP Patient Position: At rest;Supine)                                  Pulse           88                                  Temp           (!) 101.9 °F (38.8 °C)                                  Resp            24                                  Ht    6' 2\" (1.88 m)                                  Wt   123.7 kg (272 lb 11.3 oz)                                  SpO2           94%                                  BMI 35.01 kg/m²                                                                                                        Tmax:  Temp (24hrs), Av.6 °F (37.6 °C), Min:98.4 °F (36.9 °C), Max:101.9 °F (38.8 °C)                                                                                                                                    [X] Intervention, >= One:                                  [X] Imaging study <= 24h                                  ~--Admin, IQ Admin Admin on 2020 01:14 PM--~                                  - left wrist: No acute abnormality.    Version: [employer]® 2019  [facility]  © 2019 [employer] and/or one of its [place]. All Rights Reserved. CPT only © 2018 American Medical Association. All Rights Reserved.       LOC:Acute Adult-Infection: General (11/6/2020) by Meri Bowie RN         Review Status  Review Entered    In Primary  11/9/2020 13:11        Criteria Review    REVIEW SUMMARY     Patient: Meka Yoo  Review Number: [id number]  Review Status: In Primary     Condition Specific: Yes     Condition Level Of Care Code: ACUTE  Condition Level Of Care Description: Acute        OUTCOMES  Outcome Type: Primary           REVIEW DETAILS     Service Date: 11/06/2020  Admit Date: 11/01/2020  Product: [facility]  Subset: Infection: General      (Symptom or finding within 24h)      ~--Admin, IQ Admin Admin on 11- 01:11 PM--~      ID Progress Note      11/6/2020             Subjective:             Having fevers. He continues to have bilateral ankle pain. He now has proximal forearm pain and left wrist pain. No dyspnea, abdominal pain, headache or sore throat.              ROS: No anaphylaxis, seizures, syncope, hematemesis, hematochezia       Vitals:       Visit Vitals      BP     (!) 123/56 (BP 1 Location: Right arm, BP Patient Position: At rest)      Pulse             91      Temp             99 °-101.6Resp 22      Ht      6' 2\" (1.88 m)      Wt     122.8 kg (270 lb 12.8 oz)      SpO2             95%      BMI   34.77 kg/m²            Exam:        Not in distress      Pink conjunctivae, anicteric sclerae      No cervical lymphdenopathy       Lung clear, no rales, wheezes or rhonchi       Heart: s1, s2, RRR, no murmurs rubs or clicks      Abdomen: soft nontender, no guarding or rebound      Left proximal/ forearm elbow area is swollen. Left wrist is swollen. There was an IV line near the left wrist yesterday.       Speech fluent        Labs:  wbc 13.4, H/H 8.5/26, na 134, K 3.4, glu 146      Rheumatoid factor negative       Omayra neg       Mycoplasma IgG very high, IgM negative, but respiratory pcr for mycoplasma negative.                    Assessment:             #1 fever             #2 ankle tenderness             #3 groundglass opacities on CT - covid neg x 2, respiratory viral panel neg              #4 diabetes             #5 hypertension             #6 dyslipidemia             #7 superficial thrombus distal cephalic vein                   Recommendations:             The patient continues to have fever.  His main symptom is tenderness and warmth on bilateral ankles.    Arthrocentesis was done but was not able to obtain any fluid. Ortho has recommended starting steroids.               His left wrist is also now swollen. There was an IV present there yesterday. He has at least 3 joints involved now. His mycoplasma IgG titer is very high although his IgM and mycoplasma PCR from his nasopharynx are negative. Mycoplasma is known to cause extrapulmonary manifestations. Rarely it can cause arthritis.                He also has a superficial distal cephalic vein thrombosis. The IV line that was in the left forearm area has been removed. This by itself can cause fever.                    Continue abx for now.             Shelly Mathew MD             Brief Ortho Note:.              - Pt ankle pain and swelling have not improved; Exam not changed, no erythema, no warmth to Bilat ankle, moderate swelling, severe pain with palpation or passive ROM      - Gout still primary suspected etiology for symptoms. Recommendation was for initiation of steroids per medical team if they agreed - no steroids have been started. WBC normalized, afebrile overnight, no growth on blood cultures.  No having R forearm swelling and pain thought to be from thrombophlebitis/infiltration.       - Discussed with Dr. Behzad Hung; if patient does not improve recommend MRI Bilat ankle to define process, eval for effusion; If there is large effusion we will re- attempt aspiration. Meds: Tyl 650mg po x 2, Norvasc 5mg po d, Cozaar 100mg po d, Heparin 5000u sq q8, SSI x 3, Levaquin 500mg IV q 24h, Crestor 10mg po hs, LR 50h, Zosyn 3.375g IV q8h                           (Excludes PO medications unless noted)          [X] Select Day, One:              [X] Episode Day 4-6, One:                  [X] ACUTE, One:                      [X] Partial responder, not clinically stable for discharge and requires continued stay, >= One:                          [X] Fever, new onset and, Both:                              [X] Temperature, >= One:                                  [X] > 99.4°F(37.4°C) PO                                  ~--Admin, IQ Admin Admin on 11- 01:03 PM--~                                  .6                                                                                                                                    [X] Intervention, >= One:                                  [X] Imaging study <= 24h                                  ~--Admin, IQ Admin Admin on 11- 01:06 PM--~                                  ·      No evidence of lower extremity vein thrombosis in the visualized vein segments bilaterally. ·      Incidental finding of prominent groin lymph nodes bilaterally.                                                                                                           Version: InterQual® 2019  Harika Bearden and InterQual®  © 2019 [employer] and/or one of its subsidiaries. All Rights Reserved. CPT only © 2018 American Medical Association.   All Rights Reserved.       LOC:Acute Adult-General Medical (11/5/2020) by Lara Perry RN         Review Status  Review Entered    In Primary  11/6/2020 10:54        Criteria Review    REVIEW SUMMARY     Patient: Radha Box  Review Number: [id number]  Review Status: In Primary     Condition Specific: Yes     Condition Level Of Care Code: ACUTE  Condition Level Of Care Description: Acute        OUTCOMES  Outcome Type: Primary           REVIEW DETAILS     Service Date: 11/05/2020  Admit Date: 11/01/2020  Product: [facility] Adult  Subset: General Medical      (Symptom or finding within 24h)         (Excludes PO medications unless noted)          [X] Select Day, One:              [X] Episode Day 3-X, One:              ~--Admin, IQ Admin Admin on 11- 10:54 AM--~              11/5/2020 PROGRESS NOTE:              Admission Summary:               This is a 70-year-old man with a past medical history significant for type 2 diabetes, hypertension, dyslipidemia, was in his usual state of health until the day of his presentation at the emergency room when the patient developed a change in mental status.  The patient reported to his dentist's office and underwent a number of tooth extractions.  The procedure finishing at about 10:00 a.m.  The patient developed a change in mental status after the procedure. Ashia Hernandez was monitored at the dentist's office without any improvement in his mental status.  The patient was at baseline before the procedure according to report. Jovita Lucas patient was also found to be hypoxic with oxygen saturation of 88%.  The oxygen saturation improved with supplemental oxygen.  Because of the persistent change in mental status, the patient was sent to the emergency room at the [facility] Emergency [facility] in [facility] patient has no history of congestive heart failure or respiratory disease. Andrzej Verdin was no sick contact or contact with any person with COVID-19 virus infection.  When the patient arrived at the emergency room, the patient was found to have a fever of 101.9, significant leukocytosis, and elevated lactic acid level.  Lumbar puncture was performed.  The patient received antibiotics and was referred to the hospitalist service for evaluation for admission.  CT scan of the head done on arrival at the emergency room did not show any acute intracranial abnormalities.  No record of prior admission to this hospital.                                            Interval history / Subjective:              Pt seen in bed in NAD. He reports that he feels better. Discussed recurrent fever spikes and increasing WBC count as well as possible dental abscess seen on imaging. Pt LUE is extremely swollen and he states this has been this way for \"a while now. \" He endorses numbness to arm/hand. Pulse is 2+ in radial.                             Spoke with patient's son regarding possible transfer to hospital in [place], which is what they prefer. CM to call with transportation pricing. If family agrees, will initiate transfer in AM to either [facility] or [facility] in [place], [place]. Assessment & Plan:                             Acute delirium.  metabolic encephalopathy resolved               opiates related ? From dental procedure drug screen positive               - CT head negative               -Patient may have obstructive sleep apnea ABG showed some retention of CO2 placed on CPAP now more clear                              Acute respiratory failure with hypoxia.  resolved               - pt seen breathing normally in RA while examined              - CT chest reviewed                              Type 2 diabetes with hyperglycemia. A1c 7.5              -Cont AC/HS accuchecks w/ SSI              -trend BS                             Sepsis. Unclear etiology may be mandibular abscess seen in CT maxillofacial               -The diagnosis of sepsis is supported by elevated lactic acid level, fever, and significant leukocytosis.                - Patient on vancomycin and Zosyn.  LP shows normal CSF              -Ortho attempted to aspirate ankle joint fluid 11/4 unsuccessfully              - All CT reviewed  Still having fever ID consulted              - COVID test neg x2              -Appreciate ID input                              Hypertension.  Stable, cont amlodipine, cozaar; monitor BP                             Dyslipidemia.  cont preadmission medication.                             Hypokalemia - replete and repeat lab in AM                             Code status: Full              DVT prophylaxis: scd                             Care Plan discussed with: Patient/Family and Nurse              Anticipated Disposition: Home w/Family              Anticipated Discharge: 24 hours to 48 hours               Cussed with patient's son over the phone called dental surgeon and discussed the case as well              BP          135/74 (BP 1 Location: Right arm, BP Patient Position: At rest)              Pulse     74              Temp     99.7 °F (37.6 °C)              Resp      17              Ht           6' 2\" (1.88 m)              Wt          122 kg (268 lb 15.4 oz)              SpO2     97%                            Physical Examination:                                                                                                      Constitutional:      No acute distress, cooperative, pleasant               ENT:       Oral mucosa moist, oropharynx benign. Resp:      CTA bilaterally. No wheezing/rhonchi/rales. No accessory muscle use              CV:         Regular rhythm, normal rate, no murmurs, gallops, rubs               GI:          Soft, non distended, non tender. normoactive bowel sounds, no hepatosplenomegaly                Musculoskeletal:              Warm, 2+ pulses throughout; PREM edema; Junior feet edematous               Neurologic:         Moves all extremities. AAOx3, CN II-XII reviewed                                    Psych:  Good insight, Not anxious nor agitated. I&D Progress Note:              Subjective:                             Having fevers.  He continues to have bilateral ankle pain. He now has proximal forearm pain. No dyspnea, abdominal pain, headache or sore throat.                              ROS: No anaphylaxis, seizures, syncope, hematemesis, hematochezia                               Exam:                Not in distress              Pink conjunctivae, anicteric sclerae              No cervical lymphdenopathy               Lung clear, no rales, wheezes or rhonchi               Heart: s1, s2, RRR, no murmurs rubs or clicks              Abdomen: soft nontender, no guarding or rebound              Bilateral ankles are swollen warm and tender               Left proximal/ forearm elbow area is swollen. There is an IV line there. Speech fluent               Assessment:                             #1 fever                             #2 ankle tenderness                             #3 groundglass opacities on CT - covid neg x 2, respiratory viral panel neg                              #4 diabetes                             #5 hypertension                             #6 dyslipidemia                             Recommendations:                             The patient continues to have fever.  His main symptom is tenderness and warmth on bilateral ankles.  He tells me that this happens once in a while And it has been happening for a long time now. Arthrocentesis was done but was not able to obtain any fluid. Ortho has recommended starting steroids. I am okay with this. Labs: WBC 15.6, hgb 9.0/ hct 27.2, na 135, potassium 3.3, glucose 125, bun/crt ratio 11                            Lower ext: Interpretation Summary                             ·             Evidence of superficial thrombus of the left distal cephalic arm vein. ·             No evidence of left upper extremity deep vein thrombosis in the visualized vein segments.                             Meds: tylenol 650mg po q6 prn x2, norvasc 5mg po qday, cozaar 100mg, hepairn 5,000 units sc q8, humalog ssi qid sc, levaquin 500mg iv q24, zosyn 3.375 g iv q8, crestor 10mg qhs po, potassium 40meq po x1 now,                                                                                                     The left forearm is swollen. There is a IV on the left elbow area. The patient may have thrombophlebitis. I have asked the nurse to take the line out. Duplex scan of the left forearm was done and it is pending. [X] ACUTE, >= One:                      [X] Neurological, One:                          [X] Partial responder, not clinically stable for discharge and requires continued stay, >= One:                              [X] Fever, new onset and, Both:                                  [X] Temperature, >= One:                                      [X] > 99.4°F(37.4°C) PO                                      ~--Admin, IQ Admin Admin on 11- 10:47 AM--~                                      101.5, 100.3                                                                                                                                                    [X] Intervention, >= One:                                      [X] Culture pending <= 2d                                      ~--Admin, IQ Admin Admin on 11- 10:47 AM--~                                      CSF cultures pending                                                                                                                       Version: InterQual® 2019  Benigno Haw and InterQual®  © 2019 [employer] and/or one of its [place]. All Rights Reserved. CPT only © 2018 American Medical Association. All Rights Reserved.

## 2021-05-10 NOTE — PROGRESS NOTES
Ortho:    Patient's ankles/feet still with pain at rest, about the same as last week. Steroids started yesterday afternoon. Afebrile. Bilat ankle/foot edema but much less TTP than last week. Still pain with P/AROM. Knees NTTP. No hip pain with logroll bilat. 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 Cont steroids. No plans for further intervention from Ortho perspective. Consider advanced imaging if pain worsens and unresponsive to steroids. I discussed imaging with patient and he's not interested at this time. Call again if questions.     ANTHONY Martin Progress slowly Progress slowly/Increase fluids

## 2022-03-19 PROBLEM — G93.40 ENCEPHALOPATHY: Status: ACTIVE | Noted: 2020-11-01

## 2022-03-20 PROBLEM — R41.0 ACUTE DELIRIUM: Status: ACTIVE | Noted: 2020-10-30

## 2023-05-14 RX ORDER — FAMOTIDINE 20 MG/1
20 TABLET, FILM COATED ORAL DAILY
COMMUNITY
Start: 2020-11-11

## 2023-05-14 RX ORDER — NATEGLINIDE 120 MG/1
120 TABLET ORAL
COMMUNITY

## 2023-05-14 RX ORDER — CHLORTHALIDONE 25 MG/1
25 TABLET ORAL DAILY
COMMUNITY

## 2023-05-14 RX ORDER — ROSUVASTATIN CALCIUM 10 MG/1
10 TABLET, COATED ORAL NIGHTLY
COMMUNITY

## 2023-05-14 RX ORDER — PREDNISONE 20 MG/1
20 TABLET ORAL
COMMUNITY
Start: 2020-11-11

## 2023-05-14 RX ORDER — AMLODIPINE AND OLMESARTAN MEDOXOMIL 5; 40 MG/1; MG/1
1 TABLET ORAL DAILY
COMMUNITY
